# Patient Record
Sex: FEMALE | Race: WHITE | NOT HISPANIC OR LATINO | Employment: OTHER | URBAN - METROPOLITAN AREA
[De-identification: names, ages, dates, MRNs, and addresses within clinical notes are randomized per-mention and may not be internally consistent; named-entity substitution may affect disease eponyms.]

---

## 2017-06-13 ENCOUNTER — HOSPITAL ENCOUNTER (OUTPATIENT)
Facility: MEDICAL CENTER | Age: 82
End: 2017-06-13
Payer: COMMERCIAL

## 2017-06-13 PROCEDURE — 83036 HEMOGLOBIN GLYCOSYLATED A1C: CPT

## 2017-06-14 LAB
EST. AVERAGE GLUCOSE BLD GHB EST-MCNC: 157 MG/DL
HBA1C MFR BLD: 7.1 % (ref 0–5.6)

## 2017-12-12 ENCOUNTER — APPOINTMENT (OUTPATIENT)
Dept: SOCIAL WORK | Facility: CLINIC | Age: 82
End: 2017-12-12
Payer: MEDICARE

## 2017-12-12 PROCEDURE — 90736 HZV VACCINE LIVE SUBQ: CPT | Performed by: REGISTERED NURSE

## 2017-12-12 PROCEDURE — 90472 IMMUNIZATION ADMIN EACH ADD: CPT | Performed by: REGISTERED NURSE

## 2017-12-12 PROCEDURE — 90662 IIV NO PRSV INCREASED AG IM: CPT | Performed by: REGISTERED NURSE

## 2017-12-12 PROCEDURE — 90670 PCV13 VACCINE IM: CPT | Performed by: REGISTERED NURSE

## 2017-12-12 PROCEDURE — G0008 ADMIN INFLUENZA VIRUS VAC: HCPCS | Performed by: REGISTERED NURSE

## 2017-12-12 PROCEDURE — G0009 ADMIN PNEUMOCOCCAL VACCINE: HCPCS | Performed by: REGISTERED NURSE

## 2020-02-06 ENCOUNTER — OFFICE VISIT (OUTPATIENT)
Dept: URGENT CARE | Facility: CLINIC | Age: 85
End: 2020-02-06
Payer: MEDICARE

## 2020-02-06 VITALS
HEART RATE: 69 BPM | HEIGHT: 61 IN | WEIGHT: 140 LBS | BODY MASS INDEX: 26.43 KG/M2 | TEMPERATURE: 97.9 F | RESPIRATION RATE: 16 BRPM | OXYGEN SATURATION: 98 % | SYSTOLIC BLOOD PRESSURE: 140 MMHG | DIASTOLIC BLOOD PRESSURE: 50 MMHG
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
## 2020-02-06 PROCEDURE — 99204 OFFICE O/P NEW MOD 45 MIN: CPT | Mod: 25 | Performed by: FAMILY MEDICINE

## 2020-02-06 PROCEDURE — 90715 TDAP VACCINE 7 YRS/> IM: CPT | Performed by: FAMILY MEDICINE

## 2020-02-06 PROCEDURE — 90471 IMMUNIZATION ADMIN: CPT | Performed by: FAMILY MEDICINE

## 2020-02-06 RX ORDER — AMOXICILLIN AND CLAVULANATE POTASSIUM 875; 125 MG/1; MG/1
1 TABLET, FILM COATED ORAL 2 TIMES DAILY
Qty: 14 TAB | Refills: 0 | Status: SHIPPED | OUTPATIENT
Start: 2020-02-06 | End: 2020-02-13

## 2020-02-06 SDOH — HEALTH STABILITY: MENTAL HEALTH: HOW OFTEN DO YOU HAVE A DRINK CONTAINING ALCOHOL?: NEVER

## 2020-02-06 NOTE — PROGRESS NOTES
"Subjective:      Chief Complaint   Patient presents with   • Other     cat scratch                                 S/p  Cat scratch to left arm 7 d ago.          Pt continues to experience dull, achy, constant pain worse with use of the arm.   No fever.   No red streaking up the arm    She has not tried any medication for this    Past medical history was unremarkable and not pertinent to current issue  Social hx - denies tobacco, alcohol, drug use  Family hx was reviewed - no pertinent past family hx          Review of Systems   Constitutional: Negative for fever, chills and malaise/fatigue.   Eyes: Negative for vision changes, d/c.    Respiratory: Negative for cough and sputum production.    Cardiovascular: Negative for chest pain and palpitations.   Gastrointestinal: Negative for nausea, vomiting, abdominal pain, diarrhea and constipation.   Genitourinary: Negative for dysuria, urgency and frequency.   Skin: Negative for rash or  itching.   Neurological: Negative for dizziness and tingling.   Psychiatric/Behavioral: Negative for depression.   Hematologic/lymphatic - denies bruising or excessive bleeding  All other systems reviewed and are negative.       Objective:      /50 (BP Location: Right arm, Patient Position: Sitting)   Pulse 69   Temp 36.6 °C (97.9 °F)   Resp 16   Ht 1.549 m (5' 1\")   Wt 63.5 kg (140 lb)   SpO2 98%       Physical Exam   Constitutional: She is oriented to person, place, and time. Pt appears well-developed and well-nourished. No distress.   HENT:   Head: Normocephalic and atraumatic.   Eyes: Conjunctivae are normal.   Cardiovascular: Normal rate, regular rhythm and normal heart sounds.    Pulmonary/Chest: Effort normal and breath sounds normal. No respiratory distress. Pt has no wheezes.     Neurological: pt is alert and oriented to person, place, and time. No cranial nerve deficit.   Skin: Skin is warm.    There are several superficial abrasions or \"scratches\" over the left " forearm.   No red streaking up the arm.   There is some mild surrounding erythema and increased warmth around the scratch. Area is TTP  Nursing note and vitals reviewed.              Assessment/Plan:     1. Cat bite, initial encounter     - amoxicillin-clavulanate (AUGMENTIN) 875-125 MG Tab; Take 1 Tab by mouth 2 times a day for 7 days.  Dispense: 14 Tab; Refill: 0  - Tdap =>8yo IM    Follow up in one week if no improvement, sooner if symptoms worsen.

## 2020-04-27 ENCOUNTER — OFFICE VISIT (OUTPATIENT)
Dept: URGENT CARE | Facility: CLINIC | Age: 85
End: 2020-04-27
Payer: MEDICARE

## 2020-04-27 VITALS
HEIGHT: 61 IN | HEART RATE: 83 BPM | RESPIRATION RATE: 15 BRPM | DIASTOLIC BLOOD PRESSURE: 66 MMHG | OXYGEN SATURATION: 96 % | BODY MASS INDEX: 26.01 KG/M2 | SYSTOLIC BLOOD PRESSURE: 132 MMHG | TEMPERATURE: 97.3 F | WEIGHT: 137.8 LBS

## 2020-04-27 DIAGNOSIS — J06.9 VIRAL URI WITH COUGH: ICD-10-CM

## 2020-04-27 DIAGNOSIS — J98.01 BRONCHOSPASM: ICD-10-CM

## 2020-04-27 PROCEDURE — 99214 OFFICE O/P EST MOD 30 MIN: CPT | Performed by: FAMILY MEDICINE

## 2020-04-27 RX ORDER — INSULIN GLARGINE 100 [IU]/ML
10 INJECTION, SOLUTION SUBCUTANEOUS EVERY EVENING
COMMUNITY
Start: 2020-06-29 | End: 2022-01-01

## 2020-04-27 RX ORDER — ATORVASTATIN CALCIUM 20 MG/1
20 TABLET, FILM COATED ORAL NIGHTLY
COMMUNITY
End: 2022-01-01 | Stop reason: ALTCHOICE

## 2020-04-27 RX ORDER — ALBUTEROL SULFATE 90 UG/1
1-2 AEROSOL, METERED RESPIRATORY (INHALATION) EVERY 4 HOURS PRN
Qty: 1 INHALER | Refills: 0 | Status: SHIPPED | OUTPATIENT
Start: 2020-04-27 | End: 2021-01-01

## 2020-04-27 RX ORDER — METOPROLOL SUCCINATE 50 MG/1
50 TABLET, EXTENDED RELEASE ORAL DAILY
COMMUNITY
End: 2021-01-01 | Stop reason: DRUGHIGH

## 2020-04-27 RX ORDER — LETROZOLE 2.5 MG/1
2.5 TABLET, FILM COATED ORAL DAILY
COMMUNITY
End: 2021-03-31

## 2020-04-27 RX ORDER — GABAPENTIN 100 MG/1
100 CAPSULE ORAL 3 TIMES DAILY
COMMUNITY
End: 2021-03-31

## 2020-04-27 RX ORDER — BENZONATATE 100 MG/1
100 CAPSULE ORAL 3 TIMES DAILY PRN
Qty: 30 CAP | Refills: 0 | Status: SHIPPED | OUTPATIENT
Start: 2020-04-27 | End: 2021-01-01

## 2020-04-27 RX ORDER — LISINOPRIL 20 MG/1
20 TABLET ORAL 2 TIMES DAILY
COMMUNITY
End: 2022-01-01

## 2020-04-27 ASSESSMENT — ENCOUNTER SYMPTOMS
MYALGIAS: 0
SHORTNESS OF BREATH: 1
COUGH: 1
FEVER: 0
VOMITING: 0
NAUSEA: 0
SORE THROAT: 0
CHILLS: 0

## 2020-04-27 NOTE — PROGRESS NOTES
Subjective:   Sinai Huang is a 84 y.o. female who presents for Shortness of Breath (x 2 weeks; worse at night )        Cough   This is a new problem. Episode onset: 2 weeks. The problem has been unchanged. The cough is non-productive. Associated symptoms include shortness of breath (intermittently, worse at night, improves in morning). Pertinent negatives include no chest pain, chills, fever, myalgias, rash or sore throat. Risk factors: Community exposure to viral illness. She has tried nothing for the symptoms.     PMH:  has no past medical history on file.  MEDS:   Current Outpatient Medications:   •  atorvastatin (LIPITOR) 20 MG Tab, Take 20 mg by mouth every evening., Disp: , Rfl:   •  lisinopril (PRINIVIL) 20 MG Tab, Take 20 mg by mouth 2 times a day. Take 1 tablet by mouth twice a day, Disp: , Rfl:   •  metoprolol SR (TOPROL XL) 50 MG TABLET SR 24 HR, Take 50 mg by mouth every day. Take 1 tablet by mouth twice a day, Disp: , Rfl:   •  letrozole (FEMARA) 2.5 MG Tab, Take 2.5 mg by mouth every day., Disp: , Rfl:   •  insulin glargine (LANTUS) 100 UNIT/ML Solution, Inject  as instructed every evening., Disp: , Rfl:   •  gabapentin (NEURONTIN) 100 MG Cap, Take 100 mg by mouth 3 times a day. Take 1 to 2 caps by mouth at bedtime as needed, Disp: , Rfl:   •  metformin (GLUCOPHAGE) 1000 MG tablet, Take 1,000 mg by mouth 2 times a day, with meals., Disp: , Rfl:   •  albuterol 108 (90 Base) MCG/ACT Aero Soln inhalation aerosol, Inhale 1-2 Puffs by mouth every four hours as needed for Shortness of Breath., Disp: 1 Inhaler, Rfl: 0  •  benzonatate (TESSALON) 100 MG Cap, Take 1 Cap by mouth 3 times a day as needed for Cough., Disp: 30 Cap, Rfl: 0  •  Spacer/Aero-Holding Chambers Device, 1 Device by Does not apply route as needed., Disp: 1 Device, Rfl: 0  ALLERGIES: No Known Allergies  SURGHX: No past surgical history on file.  SOCHX:  reports that she has never smoked. She has never used smokeless tobacco. She reports  "that she does not drink alcohol or use drugs.  FH: Family history was reviewed  Review of Systems   Constitutional: Negative for chills and fever.   HENT: Negative for sore throat.    Respiratory: Positive for cough and shortness of breath (intermittently, worse at night, improves in morning).    Cardiovascular: Negative for chest pain.   Gastrointestinal: Negative for nausea and vomiting.   Musculoskeletal: Negative for myalgias.   Skin: Negative for rash.        Objective:   /66 (BP Location: Right arm, Patient Position: Sitting)   Pulse 83   Temp 36.3 °C (97.3 °F) (Temporal)   Resp 15   Ht 1.549 m (5' 1\")   Wt 62.5 kg (137 lb 12.8 oz)   SpO2 96%   BMI 26.04 kg/m²   Physical Exam  Vitals signs and nursing note reviewed.   Constitutional:       General: She is not in acute distress.     Appearance: She is well-developed.   HENT:      Head: Normocephalic and atraumatic.      Right Ear: Tympanic membrane and external ear normal.      Left Ear: Tympanic membrane and external ear normal.      Nose: Nose normal.      Mouth/Throat:      Mouth: Mucous membranes are moist.   Eyes:      Conjunctiva/sclera: Conjunctivae normal.   Cardiovascular:      Rate and Rhythm: Normal rate and regular rhythm.   Pulmonary:      Effort: Pulmonary effort is normal. No tachypnea or respiratory distress.      Breath sounds: Wheezing (mild) and rhonchi (few) present.      Comments: Speaking full sentences  Abdominal:      General: There is no distension.   Musculoskeletal: Normal range of motion.   Skin:     General: Skin is warm and dry.   Neurological:      General: No focal deficit present.      Mental Status: She is alert and oriented to person, place, and time. Mental status is at baseline.      Gait: Gait (gait at baseline) normal.   Psychiatric:         Judgment: Judgment normal.           Assessment/Plan:   1. Viral URI with cough  - benzonatate (TESSALON) 100 MG Cap; Take 1 Cap by mouth 3 times a day as needed for " Cough.  Dispense: 30 Cap; Refill: 0    2. Bronchospasm  - albuterol 108 (90 Base) MCG/ACT Aero Soln inhalation aerosol; Inhale 1-2 Puffs by mouth every four hours as needed for Shortness of Breath.  Dispense: 1 Inhaler; Refill: 0  - Spacer/Aero-Holding Chambers Device; 1 Device by Does not apply route as needed.  Dispense: 1 Device; Refill: 0    Other orders  - atorvastatin (LIPITOR) 20 MG Tab; Take 20 mg by mouth every evening.  - lisinopril (PRINIVIL) 20 MG Tab; Take 20 mg by mouth 2 times a day. Take 1 tablet by mouth twice a day  - metoprolol SR (TOPROL XL) 50 MG TABLET SR 24 HR; Take 50 mg by mouth every day. Take 1 tablet by mouth twice a day  - letrozole (FEMARA) 2.5 MG Tab; Take 2.5 mg by mouth every day.  - insulin glargine (LANTUS) 100 UNIT/ML Solution; Inject  as instructed every evening.  - gabapentin (NEURONTIN) 100 MG Cap; Take 100 mg by mouth 3 times a day. Take 1 to 2 caps by mouth at bedtime as needed  - metformin (GLUCOPHAGE) 1000 MG tablet; Take 1,000 mg by mouth 2 times a day, with meals.      Discussed close monitoring, return precautions, and supportive measures including maintaining adequate fluid hydration and caloric intake, relative rest and OTC symptom management including acetaminophen as needed for pain and/or fever.    Differential diagnosis, natural history, supportive care, and indications for immediate follow-up discussed.     Advised the patient to follow-up with the primary care physician for recheck, reevaluation, and consideration of further management.    Please note that this dictation was created using voice recognition software. I have worked with consultants from the vendor as well as technical experts from [employer] to optimize the interface. I have made every reasonable attempt to correct obvious errors, but I expect that there are errors of grammar and possibly content that I did not discover before finalizing the note.

## 2021-01-01 ENCOUNTER — HOME CARE VISIT (OUTPATIENT)
Dept: HOME HEALTH SERVICES | Facility: HOME HEALTHCARE | Age: 86
End: 2021-01-01
Payer: MEDICARE

## 2021-01-01 ENCOUNTER — DOCUMENTATION (OUTPATIENT)
Dept: MEDICAL GROUP | Facility: PHYSICIAN GROUP | Age: 86
End: 2021-01-01

## 2021-01-01 ENCOUNTER — TELEPHONE (OUTPATIENT)
Dept: SCHEDULING | Facility: IMAGING CENTER | Age: 86
End: 2021-01-01

## 2021-01-01 ENCOUNTER — HOME HEALTH ADMISSION (OUTPATIENT)
Dept: HOME HEALTH SERVICES | Facility: HOME HEALTHCARE | Age: 86
End: 2021-01-01
Payer: MEDICARE

## 2021-01-01 ENCOUNTER — APPOINTMENT (OUTPATIENT)
Dept: MEDICAL GROUP | Facility: PHYSICIAN GROUP | Age: 86
End: 2021-01-01
Payer: MEDICARE

## 2021-01-01 VITALS
WEIGHT: 111.06 LBS | SYSTOLIC BLOOD PRESSURE: 132 MMHG | OXYGEN SATURATION: 98 % | BODY MASS INDEX: 23.21 KG/M2 | HEART RATE: 61 BPM | RESPIRATION RATE: 16 BRPM | DIASTOLIC BLOOD PRESSURE: 58 MMHG | TEMPERATURE: 97.6 F

## 2021-01-01 VITALS
TEMPERATURE: 97.4 F | HEART RATE: 67 BPM | OXYGEN SATURATION: 97 % | RESPIRATION RATE: 16 BRPM | SYSTOLIC BLOOD PRESSURE: 140 MMHG | DIASTOLIC BLOOD PRESSURE: 62 MMHG

## 2021-01-01 VITALS
TEMPERATURE: 98.2 F | SYSTOLIC BLOOD PRESSURE: 122 MMHG | BODY MASS INDEX: 23.41 KG/M2 | BODY MASS INDEX: 23.41 KG/M2 | TEMPERATURE: 97.9 F | RESPIRATION RATE: 16 BRPM | OXYGEN SATURATION: 97 % | WEIGHT: 112 LBS | OXYGEN SATURATION: 92 % | BODY MASS INDEX: 23.25 KG/M2 | HEART RATE: 77 BPM | RESPIRATION RATE: 16 BRPM | RESPIRATION RATE: 16 BRPM | HEART RATE: 64 BPM | SYSTOLIC BLOOD PRESSURE: 118 MMHG | HEART RATE: 64 BPM | WEIGHT: 112 LBS | SYSTOLIC BLOOD PRESSURE: 130 MMHG | DIASTOLIC BLOOD PRESSURE: 58 MMHG | TEMPERATURE: 97.9 F | DIASTOLIC BLOOD PRESSURE: 60 MMHG | DIASTOLIC BLOOD PRESSURE: 60 MMHG | OXYGEN SATURATION: 98 % | WEIGHT: 111.25 LBS

## 2021-01-01 VITALS
RESPIRATION RATE: 16 BRPM | WEIGHT: 109 LBS | SYSTOLIC BLOOD PRESSURE: 130 MMHG | TEMPERATURE: 98.7 F | BODY MASS INDEX: 22.78 KG/M2 | OXYGEN SATURATION: 97 % | DIASTOLIC BLOOD PRESSURE: 62 MMHG | HEART RATE: 62 BPM

## 2021-01-01 VITALS
HEART RATE: 57 BPM | TEMPERATURE: 99.1 F | RESPIRATION RATE: 16 BRPM | SYSTOLIC BLOOD PRESSURE: 112 MMHG | DIASTOLIC BLOOD PRESSURE: 54 MMHG | OXYGEN SATURATION: 97 % | BODY MASS INDEX: 23.23 KG/M2 | WEIGHT: 111.13 LBS

## 2021-01-01 VITALS
TEMPERATURE: 99 F | DIASTOLIC BLOOD PRESSURE: 68 MMHG | WEIGHT: 112 LBS | SYSTOLIC BLOOD PRESSURE: 138 MMHG | OXYGEN SATURATION: 97 % | RESPIRATION RATE: 16 BRPM | BODY MASS INDEX: 23.41 KG/M2 | HEART RATE: 55 BPM

## 2021-01-01 VITALS
HEART RATE: 60 BPM | TEMPERATURE: 97.9 F | SYSTOLIC BLOOD PRESSURE: 122 MMHG | DIASTOLIC BLOOD PRESSURE: 64 MMHG | RESPIRATION RATE: 16 BRPM | WEIGHT: 112 LBS | BODY MASS INDEX: 23.41 KG/M2 | OXYGEN SATURATION: 98 %

## 2021-01-01 VITALS
OXYGEN SATURATION: 98 % | HEART RATE: 61 BPM | TEMPERATURE: 97.5 F | SYSTOLIC BLOOD PRESSURE: 130 MMHG | WEIGHT: 109 LBS | DIASTOLIC BLOOD PRESSURE: 60 MMHG | RESPIRATION RATE: 16 BRPM | BODY MASS INDEX: 22.78 KG/M2

## 2021-01-01 VITALS
BODY MASS INDEX: 23.2 KG/M2 | DIASTOLIC BLOOD PRESSURE: 64 MMHG | OXYGEN SATURATION: 99 % | HEART RATE: 62 BPM | SYSTOLIC BLOOD PRESSURE: 132 MMHG | TEMPERATURE: 97.6 F | RESPIRATION RATE: 16 BRPM | WEIGHT: 111 LBS

## 2021-01-01 VITALS
SYSTOLIC BLOOD PRESSURE: 130 MMHG | TEMPERATURE: 97.5 F | RESPIRATION RATE: 16 BRPM | OXYGEN SATURATION: 98 % | HEART RATE: 61 BPM | DIASTOLIC BLOOD PRESSURE: 60 MMHG

## 2021-01-01 VITALS
OXYGEN SATURATION: 97 % | BODY MASS INDEX: 23.23 KG/M2 | SYSTOLIC BLOOD PRESSURE: 112 MMHG | WEIGHT: 111.13 LBS | HEART RATE: 62 BPM | RESPIRATION RATE: 16 BRPM | DIASTOLIC BLOOD PRESSURE: 54 MMHG | TEMPERATURE: 99.1 F

## 2021-01-01 VITALS
WEIGHT: 111 LBS | OXYGEN SATURATION: 98 % | SYSTOLIC BLOOD PRESSURE: 132 MMHG | TEMPERATURE: 97.4 F | HEART RATE: 57 BPM | DIASTOLIC BLOOD PRESSURE: 58 MMHG | BODY MASS INDEX: 23.2 KG/M2 | RESPIRATION RATE: 16 BRPM

## 2021-01-01 VITALS
BODY MASS INDEX: 23.41 KG/M2 | HEART RATE: 62 BPM | DIASTOLIC BLOOD PRESSURE: 62 MMHG | TEMPERATURE: 97.9 F | OXYGEN SATURATION: 97 % | SYSTOLIC BLOOD PRESSURE: 120 MMHG | RESPIRATION RATE: 18 BRPM | WEIGHT: 112 LBS

## 2021-01-01 VITALS
RESPIRATION RATE: 16 BRPM | OXYGEN SATURATION: 95 % | HEART RATE: 64 BPM | BODY MASS INDEX: 23.46 KG/M2 | WEIGHT: 112.25 LBS | TEMPERATURE: 97.6 F | DIASTOLIC BLOOD PRESSURE: 64 MMHG | SYSTOLIC BLOOD PRESSURE: 130 MMHG

## 2021-01-01 VITALS
SYSTOLIC BLOOD PRESSURE: 120 MMHG | RESPIRATION RATE: 16 BRPM | OXYGEN SATURATION: 98 % | DIASTOLIC BLOOD PRESSURE: 60 MMHG | TEMPERATURE: 97.9 F | HEART RATE: 61 BPM

## 2021-01-01 VITALS
DIASTOLIC BLOOD PRESSURE: 62 MMHG | OXYGEN SATURATION: 97 % | TEMPERATURE: 97.9 F | BODY MASS INDEX: 23.41 KG/M2 | WEIGHT: 112 LBS | SYSTOLIC BLOOD PRESSURE: 120 MMHG | RESPIRATION RATE: 62 BRPM | HEART RATE: 62 BPM

## 2021-01-01 VITALS
HEART RATE: 67 BPM | RESPIRATION RATE: 16 BRPM | DIASTOLIC BLOOD PRESSURE: 62 MMHG | TEMPERATURE: 97.4 F | OXYGEN SATURATION: 97 % | SYSTOLIC BLOOD PRESSURE: 140 MMHG

## 2021-01-01 VITALS
HEART RATE: 73 BPM | TEMPERATURE: 97.4 F | SYSTOLIC BLOOD PRESSURE: 103 MMHG | RESPIRATION RATE: 16 BRPM | DIASTOLIC BLOOD PRESSURE: 53 MMHG | OXYGEN SATURATION: 98 %

## 2021-01-01 VITALS
BODY MASS INDEX: 24.14 KG/M2 | WEIGHT: 115 LBS | RESPIRATION RATE: 30 BRPM | HEIGHT: 58 IN | DIASTOLIC BLOOD PRESSURE: 62 MMHG | HEART RATE: 74 BPM | SYSTOLIC BLOOD PRESSURE: 138 MMHG | TEMPERATURE: 98.8 F | OXYGEN SATURATION: 98 %

## 2021-01-01 VITALS
SYSTOLIC BLOOD PRESSURE: 118 MMHG | OXYGEN SATURATION: 97 % | DIASTOLIC BLOOD PRESSURE: 60 MMHG | HEART RATE: 64 BPM | RESPIRATION RATE: 16 BRPM | TEMPERATURE: 97.9 F

## 2021-01-01 VITALS
HEART RATE: 88 BPM | BODY MASS INDEX: 23.25 KG/M2 | OXYGEN SATURATION: 98 % | WEIGHT: 111.25 LBS | RESPIRATION RATE: 16 BRPM | SYSTOLIC BLOOD PRESSURE: 140 MMHG | DIASTOLIC BLOOD PRESSURE: 80 MMHG | TEMPERATURE: 97.6 F

## 2021-01-01 VITALS
SYSTOLIC BLOOD PRESSURE: 122 MMHG | WEIGHT: 112 LBS | DIASTOLIC BLOOD PRESSURE: 60 MMHG | RESPIRATION RATE: 16 BRPM | HEART RATE: 64 BPM | TEMPERATURE: 97.9 F | OXYGEN SATURATION: 92 % | BODY MASS INDEX: 23.41 KG/M2

## 2021-01-01 VITALS
WEIGHT: 111 LBS | SYSTOLIC BLOOD PRESSURE: 138 MMHG | HEART RATE: 68 BPM | BODY MASS INDEX: 23.2 KG/M2 | DIASTOLIC BLOOD PRESSURE: 64 MMHG | OXYGEN SATURATION: 98 % | RESPIRATION RATE: 16 BRPM | TEMPERATURE: 98 F

## 2021-01-01 VITALS
TEMPERATURE: 99.1 F | OXYGEN SATURATION: 97 % | HEART RATE: 62 BPM | RESPIRATION RATE: 16 BRPM | SYSTOLIC BLOOD PRESSURE: 112 MMHG | DIASTOLIC BLOOD PRESSURE: 84 MMHG

## 2021-01-01 VITALS
RESPIRATION RATE: 16 BRPM | OXYGEN SATURATION: 98 % | TEMPERATURE: 97.9 F | WEIGHT: 112 LBS | DIASTOLIC BLOOD PRESSURE: 64 MMHG | BODY MASS INDEX: 23.41 KG/M2 | SYSTOLIC BLOOD PRESSURE: 122 MMHG | HEART RATE: 57 BPM

## 2021-01-01 PROCEDURE — G0299 HHS/HOSPICE OF RN EA 15 MIN: HCPCS

## 2021-01-01 PROCEDURE — G0152 HHCP-SERV OF OT,EA 15 MIN: HCPCS

## 2021-01-01 PROCEDURE — G0151 HHCP-SERV OF PT,EA 15 MIN: HCPCS

## 2021-01-01 PROCEDURE — G0495 RN CARE TRAIN/EDU IN HH: HCPCS

## 2021-01-01 PROCEDURE — 665001 SOC-HOME HEALTH

## 2021-01-01 SDOH — ECONOMIC STABILITY: HOUSING INSECURITY: EVIDENCE OF SMOKING MATERIAL: 0

## 2021-01-01 SDOH — ECONOMIC STABILITY: FOOD INSECURITY: MEALS PER DAY: 3

## 2021-01-01 SDOH — ECONOMIC STABILITY: HOUSING INSECURITY
HOME SAFETY: ON EACH LEVEL OF THE HOME. PATIENT DOES HAVE A FIRE ESCAPE PLAN DEVELOPED. PATIENT DOES NOT HAVE FLAMMABLE MATERIALS PRESENT IN THE HOME PRESENTING A FIRE HAZARD. NO EVIDENCE FOUND OF SMOKING MATERIALS PRESENT IN THE HOME.  OXYGEN DOSE RANGE: 1 L/MIN

## 2021-01-01 SDOH — ECONOMIC STABILITY: HOUSING INSECURITY: HOME SAFETY: RESPIRATORY ROUTE VIA: NASAL CANNULA   OXYGEN SUPPLIER: OWNS CONCENTRATOR

## 2021-01-01 SDOH — ECONOMIC STABILITY: HOUSING INSECURITY
HOME SAFETY: OXYGEN SAFETY RISK ASSESSMENT PERFORMED. PATIENT PT WAS GIVEN A NO SMOKING SIGN AND PROVIDED EDUCATION ABOUT WHY IT IS IMPORTANT TO PLACE ONE. PATIENT DOES HAVE A WORKING FIRE EXTINGUISHER PRESENT IN THE HOME. SMOKE ALARMS ARE PRESENT AND FUNCTIONAL

## 2021-01-01 ASSESSMENT — ENCOUNTER SYMPTOMS
PAIN LOCATION - PAIN QUALITY: DULL,ACHY
LOWEST PAIN SEVERITY IN PAST 24 HOURS: 3/10
PAIN LOCATION - PAIN QUALITY: SPASM
HIGHEST PAIN SEVERITY IN PAST 24 HOURS: 0/10
PAIN LOCATION - PAIN QUALITY: SHARP
PERSON REPORTING PAIN: PATIENT
LOWEST PAIN SEVERITY IN PAST 24 HOURS: 2/10
PAIN LOCATION - EXACERBATING FACTORS: STANDING, WALKING
PERSON REPORTING PAIN: PATIENT
LAST BOWEL MOVEMENT: 66072
PAIN LOCATION - PAIN SEVERITY: 8/10
PAIN SEVERITY GOAL: 0/10
HIGHEST PAIN SEVERITY IN PAST 24 HOURS: 8/10
PERSON REPORTING PAIN: PATIENT
PAIN LOCATION - PAIN QUALITY: DULL, THROBBING
PAIN LOCATION - PAIN DURATION: FEW MINUTES
PAIN: 1
STOOL FREQUENCY: LESS THAN DAILY
PAIN LOCATION: BACK
PAIN LOCATION - PAIN FREQUENCY: FREQUENT
PAIN LOCATION - PAIN QUALITY: ACHING
PAIN LOCATION - RELIEVING FACTORS: REST
SUBJECTIVE PAIN PROGRESSION: UNCHANGED
LOWEST PAIN SEVERITY IN PAST 24 HOURS: 3/10
PAIN LOCATION - EXACERBATING FACTORS: STANDING, WALKING
PERSON REPORTING PAIN: PATIENT
NAUSEA: DENIES
DEBILITATING PAIN: 1
PAIN LOCATION - PAIN FREQUENCY: FREQUENT
PERSON REPORTING PAIN: PATIENT
SUBJECTIVE PAIN PROGRESSION: GRADUALLY IMPROVING
VOMITING: DENIES
PAIN SEVERITY GOAL: 1/10
PAIN LOCATION - PAIN FREQUENCY: INTERMITTENT
PAIN: 1
NAUSEA: DENIES
PAIN SEVERITY GOAL: 0/10
PAIN SEVERITY GOAL: 0/10
PAIN LOCATION: BACK
HIGHEST PAIN SEVERITY IN PAST 24 HOURS: 6/10
PAIN LOCATION - PAIN QUALITY: ACHY
PAIN LOCATION - PAIN DURATION: DAILY
PERSON REPORTING PAIN: PATIENT
PAIN LOCATION - PAIN FREQUENCY: INTERMITTENT
LOWEST PAIN SEVERITY IN PAST 24 HOURS: 7/10
SUBJECTIVE PAIN PROGRESSION: WAXING AND WANING
HIGHEST PAIN SEVERITY IN PAST 24 HOURS: 4/10
LOWEST PAIN SEVERITY IN PAST 24 HOURS: 5/10
PAIN: 1
PAIN LOCATION - RELIEVING FACTORS: PAIN MEDICATION
PERSON REPORTING PAIN: PATIENT
HIGHEST PAIN SEVERITY IN PAST 24 HOURS: 5/10
PAIN LOCATION - PAIN DURATION: FEW MINUTES
PAIN: 1
SUBJECTIVE PAIN PROGRESSION: GRADUALLY WORSENING
PAIN LOCATION: BACK
PAIN SEVERITY GOAL: 0/10
MUSCLE WEAKNESS: 1
NAUSEA: DENIES
VOMITING: DENIES
PAIN: 1
PAIN: 1
PAIN LOCATION - PAIN FREQUENCY: FREQUENT
HIGHEST PAIN SEVERITY IN PAST 24 HOURS: 4/10
PAIN LOCATION - PAIN SEVERITY: 5/10
MUSCLE WEAKNESS: 1
VOMITING: DENIES
PAIN LOCATION - PAIN DURATION: FEW WEEKS
PAIN LOCATION - EXACERBATING FACTORS: STANDING, WALKING
HIGHEST PAIN SEVERITY IN PAST 24 HOURS: 10/10
NAUSEA: DENIES
HIGHEST PAIN SEVERITY IN PAST 24 HOURS: 1/10
PAIN SEVERITY GOAL: 0/10
DENIES PAIN: 1
PAIN LOCATION - EXACERBATING FACTORS: MOVEMENT
PAIN LOCATION - EXACERBATING FACTORS: EXERTION
PAIN LOCATION - EXACERBATING FACTORS: MOVEMENT
PAIN LOCATION - PAIN QUALITY: ACHING
LOWEST PAIN SEVERITY IN PAST 24 HOURS: 1/10
PAIN LOCATION - PAIN FREQUENCY: CONSTANT
PAIN: 1
PAIN LOCATION: BACK
PAIN LOCATION - PAIN DURATION: FEW WEEKS
BOWEL PATTERN NORMAL: 1
PAIN LOCATION - PAIN FREQUENCY: FREQUENT
PAIN: 1
PAIN LOCATION - PAIN SEVERITY: 5/10
ASSOCIATED SYMPTOMS: DENIES
LOWEST PAIN SEVERITY IN PAST 24 HOURS: 2/10
PAIN LOCATION - EXACERBATING FACTORS: STANDING, WALKING
PAIN LOCATION - PAIN QUALITY: DULL
PERSON REPORTING PAIN: PATIENT
PAIN LOCATION - PAIN SEVERITY: 8/10
PAIN SEVERITY GOAL: 1/10
POOR JUDGMENT: 1
LOWEST PAIN SEVERITY IN PAST 24 HOURS: 4/10
PERSON REPORTING PAIN: PATIENT
ASSOCIATED SYMPTOMS: DENIES
PAIN: 1
PAIN LOCATION - EXACERBATING FACTORS: EXERTION
PAIN LOCATION - PAIN SEVERITY: 6/10
PAIN LOCATION - PAIN FREQUENCY: CONSTANT
LOWEST PAIN SEVERITY IN PAST 24 HOURS: 2/10
PAIN LOCATION: BACK
VOMITING: DENIES
PAIN SEVERITY GOAL: 0/10
HIGHEST PAIN SEVERITY IN PAST 24 HOURS: 4/10
PAIN LOCATION - PAIN DURATION: DAILY
LOWEST PAIN SEVERITY IN PAST 24 HOURS: 4/10
HIGHEST PAIN SEVERITY IN PAST 24 HOURS: 4/10
PERSON REPORTING PAIN: PATIENT
SUBJECTIVE PAIN PROGRESSION: UNCHANGED
PERSON REPORTING PAIN: PATIENT
DENIES PAIN: 1
PAIN LOCATION - PAIN DURATION: 20 MINUTES
PERSON REPORTING PAIN: PATIENT
HIGHEST PAIN SEVERITY IN PAST 24 HOURS: 6/10
LOWEST PAIN SEVERITY IN PAST 24 HOURS: 5/10
PAIN LOCATION: BACK
SUBJECTIVE PAIN PROGRESSION: UNCHANGED
LOWEST PAIN SEVERITY IN PAST 24 HOURS: 0/10
PAIN LOCATION - PAIN DURATION: 2 DAYS
SUBJECTIVE PAIN PROGRESSION: WAXING AND WANING
PAIN LOCATION - PAIN SEVERITY: 4/10
PAIN SEVERITY GOAL: 1/10
PAIN SEVERITY GOAL: 0/10
PAIN SEVERITY GOAL: 0/10
PAIN LOCATION - PAIN SEVERITY: 8/10
PAIN LOCATION - EXACERBATING FACTORS: STANDING, WALKING, MOVING
PAIN LOCATION - PAIN DURATION: DAILY
PAIN LOCATION - PAIN SEVERITY: 6/10
PAIN LOCATION - EXACERBATING FACTORS: MOVEMENT
PAIN LOCATION - PAIN QUALITY: ACHING
PAIN LOCATION: BACK
PERSON REPORTING PAIN: PATIENT
PAIN LOCATION - PAIN SEVERITY: 8/10
HIGHEST PAIN SEVERITY IN PAST 24 HOURS: 7/10
PAIN LOCATION - PAIN DURATION: DAILY
PAIN LOCATION - RELIEVING FACTORS: REST, TYLENOL
NAUSEA: DENIES
PAIN: 1
PAIN LOCATION: BACK
HIGHEST PAIN SEVERITY IN PAST 24 HOURS: 8/10
PAIN SEVERITY GOAL: 1/10
NAUSEA: DENIES
VOMITING: DENIES
PAIN LOCATION - PAIN SEVERITY: 5/10
PAIN SEVERITY GOAL: 0/10
SUBJECTIVE PAIN PROGRESSION: UNCHANGED
PAIN LOCATION: BACK
PAIN LOCATION - PAIN SEVERITY: 5/10
PAIN LOCATION - PAIN SEVERITY: 3/10
PAIN LOCATION: BACK
PAIN LOCATION - RELIEVING FACTORS: PAIN MEDS, REST, REPOSITIONING
PAIN LOCATION: BACK
PAIN: 1
ASSOCIATED SYMPTOMS: DENIES
VOMITING: DENIES
PAIN LOCATION - RELIEVING FACTORS: REST, TYLENOL
HYPERTENSION: 1
PAIN LOCATION - PAIN SEVERITY: 3/10
PERSON REPORTING PAIN: PATIENT
PAIN LOCATION - PAIN QUALITY: ACHING, BURNING
SUBJECTIVE PAIN PROGRESSION: UNCHANGED
ASSOCIATED SYMPTOMS: DENIES
CHANGE IN APPETITE: INCREASED
PAIN SEVERITY GOAL: 0/10
PAIN LOCATION - RELIEVING FACTORS: REST
NAUSEA: DENIES
SUBJECTIVE PAIN PROGRESSION: WAXING AND WANING
PAIN LOCATION - PAIN FREQUENCY: INTERMITTENT
PERSON REPORTING PAIN: PATIENT
LOWEST PAIN SEVERITY IN PAST 24 HOURS: 6/10
SUBJECTIVE PAIN PROGRESSION: WAXING AND WANING
SUBJECTIVE PAIN PROGRESSION: WAXING AND WANING
PAIN LOCATION - RELIEVING FACTORS: REST, REPOSITIONING
POOR JUDGMENT: 1
PAIN LOCATION - PAIN FREQUENCY: INTERMITTENT
PAIN LOCATION: CHEST
PAIN: 1
PAIN LOCATION - PAIN QUALITY: DULL, ACHY, SHARP
PAIN SEVERITY GOAL: 0/10
PAIN LOCATION: GENERALIZED
PAIN LOCATION - PAIN FREQUENCY: CONSTANT
PERSON REPORTING PAIN: PATIENT
ASSOCIATED SYMPTOMS: DENIES
SUBJECTIVE PAIN PROGRESSION: UNCHANGED
PAIN LOCATION - RELIEVING FACTORS: TYLENOL
LOWEST PAIN SEVERITY IN PAST 24 HOURS: 5/10
PAIN LOCATION - RELIEVING FACTORS: REST
HIGHEST PAIN SEVERITY IN PAST 24 HOURS: 2/10
PAIN LOCATION - PAIN DURATION: DAILY
LOWEST PAIN SEVERITY IN PAST 24 HOURS: 0/10
PAIN: 1
PAIN LOCATION - PAIN FREQUENCY: INFREQUENT
PERSON REPORTING PAIN: PATIENT
HIGHEST PAIN SEVERITY IN PAST 24 HOURS: 8/10
PAIN LOCATION - PAIN DURATION: COUPLE WEEKS
PAIN LOCATION - PAIN SEVERITY: 8/10
PAIN LOCATION - RELIEVING FACTORS: PAIN MEDS, REST, REPOSITIONING
HIGHEST PAIN SEVERITY IN PAST 24 HOURS: 4/10
VOMITING: DENIES
ASSOCIATED SYMPTOMS: DENIES
HIGHEST PAIN SEVERITY IN PAST 24 HOURS: 10/10
PAIN: 1
PAIN SEVERITY GOAL: 1/10
PAIN: 1
MUSCLE WEAKNESS: 1
PAIN LOCATION - PAIN SEVERITY: 3/10
PERSON REPORTING PAIN: PATIENT
DEBILITATING PAIN: 1
NAUSEA: DENIES
DIFFICULTY THINKING: 1
LOWEST PAIN SEVERITY IN PAST 24 HOURS: 1/10
SUBJECTIVE PAIN PROGRESSION: WAXING AND WANING
SUBJECTIVE PAIN PROGRESSION: UNCHANGED
PAIN LOCATION - PAIN SEVERITY: 5/10
ASSOCIATED SYMPTOMS: DENIES
PAIN: 1
NAUSEA: DENIES
VOMITING: DENIES
SUBJECTIVE PAIN PROGRESSION: UNCHANGED
PAIN LOCATION: BACK
LOWEST PAIN SEVERITY IN PAST 24 HOURS: 2/10
PAIN LOCATION - PAIN DURATION: FEW WEEKS
SUBJECTIVE PAIN PROGRESSION: GRADUALLY WORSENING
MUSCLE WEAKNESS: 1
SUBJECTIVE PAIN PROGRESSION: UNCHANGED
PAIN: 1
PAIN LOCATION - PAIN DURATION: FEW WEEKS
PAIN: 1
PAIN LOCATION - PAIN DURATION: DAILY
MUSCLE WEAKNESS: 1
PERSON REPORTING PAIN: PATIENT
LOWEST PAIN SEVERITY IN PAST 24 HOURS: 4/10
PAIN LOCATION - RELIEVING FACTORS: REST, REPOSITIONING
NAUSEA: DENIES
MUSCLE WEAKNESS: 1
PAIN LOCATION - RELIEVING FACTORS: REST
PAIN SEVERITY GOAL: 0/10
PAIN LOCATION - PAIN QUALITY: SPASMS
PAIN LOCATION - RELIEVING FACTORS: REST, MEDICATIONS
PAIN LOCATION - PAIN FREQUENCY: CONSTANT
MUSCLE WEAKNESS: 1
DENIES PAIN: 1
PERSON REPORTING PAIN: PATIENT
HIGHEST PAIN SEVERITY IN PAST 24 HOURS: 10/10
PAIN LOCATION - RELIEVING FACTORS: REST, REPOSITIONING, PAIN MEDS
PAIN LOCATION - PAIN QUALITY: ACHE
PAIN LOCATION - RELIEVING FACTORS: ACETAMINOPHEN
PAIN SEVERITY GOAL: 1/10
PAIN LOCATION - PAIN FREQUENCY: INTERMITTENT
NAUSEA: DENIES
HIGHEST PAIN SEVERITY IN PAST 24 HOURS: 8/10
SUBJECTIVE PAIN PROGRESSION: WAXING AND WANING
PAIN LOCATION - PAIN SEVERITY: 3/10
PAIN LOCATION: BACK
SUBJECTIVE PAIN PROGRESSION: WAXING AND WANING
LOWEST PAIN SEVERITY IN PAST 24 HOURS: 0/10
PAIN LOCATION - PAIN DURATION: 30 MINUTES
PAIN LOCATION - EXACERBATING FACTORS: EXERTION
PAIN LOCATION - PAIN QUALITY: ACHING
HIGHEST PAIN SEVERITY IN PAST 24 HOURS: 10/10
LOWEST PAIN SEVERITY IN PAST 24 HOURS: 0/10
LOWEST PAIN SEVERITY IN PAST 24 HOURS: 2/10
PAIN SEVERITY GOAL: 0/10
ASSOCIATED SYMPTOMS: DENIES
SUBJECTIVE PAIN PROGRESSION: WAXING AND WANING
PERSON REPORTING PAIN: PATIENT
SUBJECTIVE PAIN PROGRESSION: UNCHANGED
PAIN LOCATION - EXACERBATING FACTORS: MOVEMENT
LOWEST PAIN SEVERITY IN PAST 24 HOURS: 2/10
PERSON REPORTING PAIN: PATIENT
PAIN LOCATION - PAIN QUALITY: ACHING
APPETITE LEVEL: GOOD
HIGHEST PAIN SEVERITY IN PAST 24 HOURS: 3/10
PAIN: 1
PAIN LOCATION - PAIN SEVERITY: 6/10
PAIN LOCATION - PAIN QUALITY: ACHING, BURNING
PAIN LOCATION - PAIN FREQUENCY: FREQUENT
LOWEST PAIN SEVERITY IN PAST 24 HOURS: 3/10
HIGHEST PAIN SEVERITY IN PAST 24 HOURS: 7/10
LOWEST PAIN SEVERITY IN PAST 24 HOURS: 2/10
PAIN LOCATION: BACK
MUSCLE WEAKNESS: 1
DEBILITATING PAIN: 1
PAIN LOCATION - PAIN FREQUENCY: CONSTANT
VOMITING: DENIES
PAIN LOCATION - RELIEVING FACTORS: MEDICATION
VOMITING: DENIES
PAIN LOCATION - PAIN DURATION: FEW MINUTES
PAIN LOCATION: RIGHT LEG
PAIN LOCATION - PAIN FREQUENCY: INTERMITTENT
PAIN LOCATION - EXACERBATING FACTORS: MOVEMENT
PAIN SEVERITY GOAL: 0/10
PAIN SEVERITY GOAL: 0/10
PERSON REPORTING PAIN: PATIENT
PAIN LOCATION: BACK
PAIN: 1
ASSOCIATED SYMPTOMS: DENIES
PAIN LOCATION - PAIN DURATION: DAILY
PAIN LOCATION - PAIN DURATION: DAILY
SHORTNESS OF BREATH: T
VOMITING: DENIES
PAIN LOCATION - PAIN SEVERITY: 5/10
PAIN LOCATION - PAIN QUALITY: ACHING, BURNING
PAIN LOCATION - PAIN FREQUENCY: FREQUENT
PAIN LOCATION - PAIN SEVERITY: 6/10
PAIN LOCATION - EXACERBATING FACTORS: EXERTION
PAIN LOCATION: BACK
PAIN LOCATION - PAIN FREQUENCY: FREQUENT
HIGHEST PAIN SEVERITY IN PAST 24 HOURS: 8/10
LOWEST PAIN SEVERITY IN PAST 24 HOURS: 8/10
PAIN LOCATION - PAIN DURATION: 20 MINUTES
PAIN: 1
PAIN: 1
HIGHEST PAIN SEVERITY IN PAST 24 HOURS: 8/10
PAIN LOCATION - RELIEVING FACTORS: REST, PAIN MEDS
PERSON REPORTING PAIN: PATIENT
PAIN LOCATION - PAIN FREQUENCY: FREQUENT
PAIN LOCATION - EXACERBATING FACTORS: EXERTION
PAIN SEVERITY GOAL: 0/10
PERSON REPORTING PAIN: PATIENT
PERSON REPORTING PAIN: PATIENT
PAIN SEVERITY GOAL: 0/10
PAIN LOCATION - PAIN FREQUENCY: FREQUENT
PAIN LOCATION - PAIN DURATION: A WEEK
PAIN LOCATION - PAIN QUALITY: ACHING, BURNING
PAIN SEVERITY GOAL: 0/10
PAIN LOCATION: BACK
SUBJECTIVE PAIN PROGRESSION: UNCHANGED
PAIN LOCATION - PAIN SEVERITY: 9/10
HIGHEST PAIN SEVERITY IN PAST 24 HOURS: 9/10
PAIN LOCATION - PAIN QUALITY: ACHING, BURNING
PAIN LOCATION - PAIN QUALITY: ACHING
PAIN LOCATION - EXACERBATING FACTORS: STANDING, WALKING
PAIN SEVERITY GOAL: 0/10

## 2021-01-01 ASSESSMENT — FIBROSIS 4 INDEX
FIB4 SCORE: 1.275362419896548378

## 2021-01-01 ASSESSMENT — ACTIVITIES OF DAILY LIVING (ADL)
DRESSING_UB_CURRENT_FUNCTION: STAND BY ASSIST
AMBULATION ASSISTANCE: STAND BY ASSIST
PHYSICAL TRANSFERS ASSESSED: 1
FEEDING_COMMENTS: SEE OT NOTES
AMBULATION ASSISTANCE: SUPERVISION
ORAL_CARE_ASSESSED: 1
ORAL_CARE_CURRENT_FUNCTION: INDEPENDENT
AMBULATION ASSISTANCE ON FLAT SURFACES: 1
AMBULATION ASSISTANCE: 1
LAUNDRY: DEPENDENT
PHYSICAL TRANSFERS ASSESSED: 1
LAUNDRY ASSESSED: 1
OASIS_M1830: 05
BATHING_COMMENTS: SEE OT NOTES
TOILETING: 1
AMBULATION ASSISTANCE: 1
TRANSPORTATION COMMENTS: PT REQUIRES ASSIST AND ASSISTIVE DEVICE TO LEAVE HOME; FALL RISK
FEEDING ASSESSED: 1
LIGHT HOUSEKEEPING: NEEDS ASSISTANCE
PHYSICAL_TRANSFERS_DEVICES: USE OF B UES
TOILETING: STAND BY ASSIST
AMBULATION ASSISTANCE: STAND BY ASSIST
AMBULATION ASSISTANCE: 1
GROOMING_WITHIN_DEFINED_LIMITS: 1
CURRENT_FUNCTION: STAND BY ASSIST
FEEDING_WITHIN_DEFINED_LIMITS: 1
AMBULATION ASSISTANCE ON FLAT SURFACES: 1
CURRENT_FUNCTION: STAND BY ASSIST
GROOMING_CURRENT_FUNCTION: INDEPENDENT
CURRENT_FUNCTION: STAND BY ASSIST
CURRENT_FUNCTION: SUPERVISION
DRESSING_LB_CURRENT_FUNCTION: MINIMUM ASSIST
FEEDING: INDEPENDENT
PHYSICAL TRANSFERS ASSESSED: 1
HOUSEKEEPING ASSESSED: 1
GROOMING ASSESSED: 1
GROOMING_COMMENTS: SEE OT NOTES

## 2021-01-01 ASSESSMENT — BALANCE ASSESSMENTS
NUDGED: 1 - STAGGERS, GRABS, CATCHES SELF
STANDING BALANCE: 1 - STEADY BUT WIDE STANCE AND USES CANE OR OTHER SUPPORT
EYES CLOSED AT MAXIMUM POSITION NUDGED: 0 - UNSTEADY
ARISING SCORE: 1
ARISES: 1 - ABLE, USES ARMS TO HELP
ATTEMPTS TO ARISE: 2 - ABLE TO RISE, ONE ATTEMPT
IMMEDIATE STANDING BALANCE FIRST 5 SECONDS: 1 - STEADY BUT USES WALKER OR OTHER SUPPORT
SITTING BALANCE: 1 - STEADY, SAFE
TURNING 360 DEGREES STEPS: 1 - CONTINUOUS STEPS
SITTING DOWN: 1 - USES ARMS OR NOT SMOOTH MOTION
NUDGED SCORE: 1
BALANCE SCORE: 10

## 2021-01-01 ASSESSMENT — GAIT ASSESSMENTS
STEP SYMMETRY: 1 - RIGHT AND LEFT STEP LENGTH APPEAR EQUAL
BALANCE AND GAIT SCORE: 19
PATH: 1 - MILD/MODERATE DEVIATION OR USES WALKING AID
STEP CONTINUITY: 1 - STEPS APPEAR CONTINUOUS
GAIT SCORE: 9
PATH SCORE: 1
INITIATION OF GAIT IMMEDIATELY AFTER GO: 1 - NO HESITANCY
TRUNK SCORE: 1
TRUNK: 1 - NO SWAY BUT FLEXION OF KNEES OR BACK OR SPREADS ARMS WHILE WALKING
WALKING STANCE: 0 - HEELS APART

## 2021-01-01 ASSESSMENT — PATIENT HEALTH QUESTIONNAIRE - PHQ9
1. LITTLE INTEREST OR PLEASURE IN DOING THINGS: 00
2. FEELING DOWN, DEPRESSED, IRRITABLE, OR HOPELESS: 00
CLINICAL INTERPRETATION OF PHQ2 SCORE: 0

## 2021-01-11 DIAGNOSIS — Z23 NEED FOR VACCINATION: ICD-10-CM

## 2021-03-29 ENCOUNTER — HOME HEALTH ADMISSION (OUTPATIENT)
Dept: HOME HEALTH SERVICES | Facility: HOME HEALTHCARE | Age: 86
End: 2021-03-29
Payer: MEDICARE

## 2021-03-31 ENCOUNTER — HOME CARE VISIT (OUTPATIENT)
Dept: HOME HEALTH SERVICES | Facility: HOME HEALTHCARE | Age: 86
End: 2021-03-31
Payer: MEDICARE

## 2021-03-31 VITALS
BODY MASS INDEX: 22.84 KG/M2 | DIASTOLIC BLOOD PRESSURE: 70 MMHG | OXYGEN SATURATION: 99 % | HEART RATE: 68 BPM | RESPIRATION RATE: 18 BRPM | TEMPERATURE: 97.6 F | SYSTOLIC BLOOD PRESSURE: 130 MMHG | WEIGHT: 124.13 LBS | HEIGHT: 62 IN

## 2021-03-31 PROCEDURE — 665001 SOC-HOME HEALTH

## 2021-03-31 PROCEDURE — G0493 RN CARE EA 15 MIN HH/HOSPICE: HCPCS

## 2021-03-31 SDOH — ECONOMIC STABILITY: HOUSING INSECURITY: HOME SAFETY: BATHROOM LACKS GRAB BARS, HUSBAND STATES HE WILL BE HIRING SOMEONE TO INSTALL GRAB BARS

## 2021-03-31 ASSESSMENT — PATIENT HEALTH QUESTIONNAIRE - PHQ9
2. FEELING DOWN, DEPRESSED, IRRITABLE, OR HOPELESS: 00
1. LITTLE INTEREST OR PLEASURE IN DOING THINGS: 00
CLINICAL INTERPRETATION OF PHQ2 SCORE: 0

## 2021-03-31 ASSESSMENT — ENCOUNTER SYMPTOMS
NAUSEA: DENIES
VOMITING: DENIES

## 2021-04-01 ENCOUNTER — HOME CARE VISIT (OUTPATIENT)
Dept: HOME HEALTH SERVICES | Facility: HOME HEALTHCARE | Age: 86
End: 2021-04-01
Payer: MEDICARE

## 2021-04-01 VITALS
WEIGHT: 124 LBS | SYSTOLIC BLOOD PRESSURE: 114 MMHG | OXYGEN SATURATION: 97 % | BODY MASS INDEX: 22.68 KG/M2 | RESPIRATION RATE: 17 BRPM | HEART RATE: 65 BPM | DIASTOLIC BLOOD PRESSURE: 76 MMHG | TEMPERATURE: 97.4 F

## 2021-04-01 PROCEDURE — G0151 HHCP-SERV OF PT,EA 15 MIN: HCPCS

## 2021-04-01 ASSESSMENT — GAIT ASSESSMENTS
BALANCE AND GAIT SCORE: 18
WALKING STANCE: 0 - HEELS APART
TRUNK: 0 - MARKED SWAY OR USES WALKING AID
PATH SCORE: 1
INITIATION OF GAIT IMMEDIATELY AFTER GO: 1 - NO HESITANCY
STEP SYMMETRY: 1 - RIGHT AND LEFT STEP LENGTH APPEAR EQUAL
STEP CONTINUITY: 1 - STEPS APPEAR CONTINUOUS
TRUNK SCORE: 0
PATH: 1 - MILD/MODERATE DEVIATION OR USES WALKING AID
GAIT SCORE: 8

## 2021-04-01 ASSESSMENT — BALANCE ASSESSMENTS
SITTING BALANCE: 1 - STEADY, SAFE
BALANCE SCORE: 10
IMMEDIATE STANDING BALANCE FIRST 5 SECONDS: 1 - STEADY BUT USES WALKER OR OTHER SUPPORT
ARISES: 1 - ABLE, USES ARMS TO HELP
NUDGED SCORE: 1
TURNING 360 DEGREES STEPS: 1 - CONTINUOUS STEPS
SITTING DOWN: 1 - USES ARMS OR NOT SMOOTH MOTION
NUDGED: 1 - STAGGERS, GRABS, CATCHES SELF
STANDING BALANCE: 1 - STEADY BUT WIDE STANCE AND USES CANE OR OTHER SUPPORT
ATTEMPTS TO ARISE: 2 - ABLE TO RISE, ONE ATTEMPT
ARISING SCORE: 1
EYES CLOSED AT MAXIMUM POSITION NUDGED: 1 - STEADY

## 2021-04-01 ASSESSMENT — ACTIVITIES OF DAILY LIVING (ADL)
AMBULATION ASSISTANCE: STAND BY ASSIST
PHYSICAL TRANSFERS ASSESSED: 1
PHYSICAL_TRANSFERS_DEVICES: USE OF B UES TO ASSIST
CURRENT_FUNCTION: STAND BY ASSIST
FEEDING_COMMENTS: SEE OT EVAL
AMBULATION ASSISTANCE ON FLAT SURFACES: 1
BATHING_COMMENTS: SEE OT EVAL
AMBULATION ASSISTANCE: 1
GROOMING_COMMENTS: SEE OT EVAL

## 2021-04-01 ASSESSMENT — ENCOUNTER SYMPTOMS
MUSCLE WEAKNESS: 1
POOR JUDGMENT: 1

## 2021-04-02 ENCOUNTER — HOME CARE VISIT (OUTPATIENT)
Dept: HOME HEALTH SERVICES | Facility: HOME HEALTHCARE | Age: 86
End: 2021-04-02
Payer: MEDICARE

## 2021-04-02 ENCOUNTER — TELEPHONE (OUTPATIENT)
Dept: MEDICAL GROUP | Facility: PHYSICIAN GROUP | Age: 86
End: 2021-04-02

## 2021-04-02 VITALS
TEMPERATURE: 97.6 F | RESPIRATION RATE: 17 BRPM | OXYGEN SATURATION: 95 % | BODY MASS INDEX: 22.59 KG/M2 | DIASTOLIC BLOOD PRESSURE: 70 MMHG | HEART RATE: 67 BPM | SYSTOLIC BLOOD PRESSURE: 118 MMHG | WEIGHT: 123.5 LBS

## 2021-04-02 PROCEDURE — G0299 HHS/HOSPICE OF RN EA 15 MIN: HCPCS

## 2021-04-02 ASSESSMENT — ENCOUNTER SYMPTOMS
MUSCLE WEAKNESS: 1
NAUSEA: DENIES
VOMITING: DENIES

## 2021-04-02 NOTE — TELEPHONE ENCOUNTER
Medication chart review for [facility] services    PCP:  Toby Davis M.D.  [address]  [place] [address]  Fax: [phone]    Current medication list     Current Outpatient Medications:   •  furosemide, 20 mg, Oral, QDAY PRN  •  potassium chloride SA, 20 mEq, Oral, QDAY PRN  •  BABY ASPIRIN PO, 81 mg, Oral, DAILY AT 1800  •  EPINEPHrine (PRIMATENE MIST INH), 1 Inhalation, Inhalation, QDAY PRN  •  diclofenac sodium, 1 Application, Apply externally, QDAY PRN  •  B Complex-C-Folic Acid (HM VITAMIN B COMPLEX/VITAMIN C PO), 1 tablet, Oral, DAILY  •  Trulicity, 0.5 mL, Subcutaneous, Q7 DAYS  •  atorvastatin, 20 mg, Oral, Nightly  •  lisinopril, 20 mg, Oral, BID  •  metoprolol SR, 50 mg, Oral, DAILY  •  insulin glargine, 5 Units, Subcutaneous, Q EVENING  •  metformin, 1,000 mg, Oral, BID WITH MEALS  •  albuterol, 1-2 Puff, Inhalation, Q4HRS PRN (Patient not taking: Reported on 3/31/2021)  •  benzonatate, 100 mg, Oral, TID PRN (Patient not taking: Reported on 3/31/2021)  •  Spacer/Aero-Holding Chambers, 1 Device, Does not apply, PRN    No Known Allergies      Labs / Images Reviewed:   No results found for: SODIUM, POTASSIUM, CHLORIDE, CO2, GLUCOSE, BUN, CREATININE, BUNCREATRAT, GLOMRATE   No results found for: ALKPHOSPHAT, ASTSGOT, ALTSGPT, TBILIRUBIN, ALBUMIN, INR         Assessment and Plan:   • Received referral from [facility]. Medications reviewed. No clinically significant interactions noted.             Toan Reeves, PharmD, MS, BCACP, LCC  [facility]  Phone [phone] fax [phone]    This note was created using voice recognition software (Dragon). The accuracy of the dictation is limited by the abilities of the software. I have reviewed the note prior to signing, however some errors in grammar and context are still possible. If you have any questions related to this note please do not hesitate to contact our office.

## 2021-04-05 ENCOUNTER — HOME CARE VISIT (OUTPATIENT)
Dept: HOME HEALTH SERVICES | Facility: HOME HEALTHCARE | Age: 86
End: 2021-04-05
Payer: MEDICARE

## 2021-04-05 VITALS
HEART RATE: 68 BPM | TEMPERATURE: 97.6 F | RESPIRATION RATE: 17 BRPM | BODY MASS INDEX: 22.34 KG/M2 | DIASTOLIC BLOOD PRESSURE: 70 MMHG | WEIGHT: 122.13 LBS | SYSTOLIC BLOOD PRESSURE: 138 MMHG | OXYGEN SATURATION: 96 %

## 2021-04-05 PROCEDURE — G0299 HHS/HOSPICE OF RN EA 15 MIN: HCPCS

## 2021-04-05 PROCEDURE — G0151 HHCP-SERV OF PT,EA 15 MIN: HCPCS

## 2021-04-05 ASSESSMENT — ENCOUNTER SYMPTOMS
NAUSEA: DENIES
MUSCLE WEAKNESS: 1

## 2021-04-06 ENCOUNTER — HOME CARE VISIT (OUTPATIENT)
Dept: HOME HEALTH SERVICES | Facility: HOME HEALTHCARE | Age: 86
End: 2021-04-06
Payer: MEDICARE

## 2021-04-06 VITALS
WEIGHT: 122.13 LBS | BODY MASS INDEX: 22.34 KG/M2 | DIASTOLIC BLOOD PRESSURE: 70 MMHG | TEMPERATURE: 97.6 F | OXYGEN SATURATION: 96 % | SYSTOLIC BLOOD PRESSURE: 138 MMHG | HEART RATE: 68 BPM | RESPIRATION RATE: 17 BRPM

## 2021-04-06 PROCEDURE — G0152 HHCP-SERV OF OT,EA 15 MIN: HCPCS

## 2021-04-06 ASSESSMENT — ACTIVITIES OF DAILY LIVING (ADL): OASIS_M1830: 05

## 2021-04-06 NOTE — PROGRESS NOTES
Quality Review for 3.31.21 SOC OASIS performed on by DOMINGA Paige RN on 4.6., 2021:    Edits completed by DOMINGA Paige RN:  1. Labeled chart for HF/REMSA  2. Added poor ambulation to HB status per narrative  3. Changed , , ,  to 2,  to 1,  to 2,  to 3, [id number] C-H to 4, MA1350 D,F to 4 per narrative reporting pt needs supervision with ambulation, transfers, dressing and uses a walker. Changed  to 5 pt would need showering DME to safely perform  4. Changed  and  to 3 per ambulation score   5. Changed  to 8   6. Added up as tolerated to activities permitted. Added ambulate only with assistance, correct use of support devices, needle precautions, proper medication use, fall risk and high risk medication precautions to safety measures. Added heart healthy diet to nutritional requirements per dxs  7.  Added F2F data

## 2021-04-07 ENCOUNTER — HOME CARE VISIT (OUTPATIENT)
Dept: HOME HEALTH SERVICES | Facility: HOME HEALTHCARE | Age: 86
End: 2021-04-07
Payer: MEDICARE

## 2021-04-07 VITALS
TEMPERATURE: 97.7 F | WEIGHT: 123 LBS | DIASTOLIC BLOOD PRESSURE: 70 MMHG | RESPIRATION RATE: 17 BRPM | BODY MASS INDEX: 22.5 KG/M2 | HEART RATE: 84 BPM | SYSTOLIC BLOOD PRESSURE: 132 MMHG | OXYGEN SATURATION: 98 %

## 2021-04-07 VITALS
OXYGEN SATURATION: 98 % | HEART RATE: 67 BPM | RESPIRATION RATE: 16 BRPM | BODY MASS INDEX: 22.61 KG/M2 | WEIGHT: 123.6 LBS

## 2021-04-07 PROCEDURE — G0299 HHS/HOSPICE OF RN EA 15 MIN: HCPCS

## 2021-04-07 ASSESSMENT — ACTIVITIES OF DAILY LIVING (ADL)
PHYSICAL TRANSFERS ASSESSED: 1
PREPARING MEALS: NEEDS ASSISTANCE
GROOMING_CURRENT_FUNCTION: INDEPENDENT
GROOMING ASSESSED: 1
DRESSING_UB_CURRENT_FUNCTION: INDEPENDENT
LAUNDRY ASSESSED: 1
FEEDING_WITHIN_DEFINED_LIMITS: 1
LAUNDRY: NEEDS ASSISTANCE
BATHING ASSESSED: 1
TOILETING: SUPERVISION
FEEDING: INDEPENDENT
DRESSING_LB_CURRENT_FUNCTION: INDEPENDENT
AMBULATION ASSISTANCE: 1
TOILETING: 1
CURRENT_FUNCTION: STAND BY ASSIST
FEEDING ASSESSED: 1
AMBULATION ASSISTANCE: STAND BY ASSIST
GROOMING_WITHIN_DEFINED_LIMITS: 1

## 2021-04-07 ASSESSMENT — ENCOUNTER SYMPTOMS
MUSCLE WEAKNESS: 1
VOMITING: DENIES
NAUSEA: DENIES
DIFFICULTY THINKING: 1

## 2021-04-07 NOTE — PROGRESS NOTES
I agree with the changes made.  ----- Message -----  From: Nini Paige R.N.  Sent: 4/6/2021   2:46 PM PDT  To: Joi Hebert R.N.      Quality Review for 3.31.21 SOC OASIS performed on by DOMINGA Paige RN on 4.6., 2021:    Edits completed by DOMINGA Paige RN:  1. Labeled chart for HF/REMSA  2. Added poor ambulation to HB status per narrative  3. Changed , , ,  to 2,  to 1,  to 2,  to 3, CC5774 C-H to 4, [id number] D,F to 4 per narrative reporting pt needs supervision with ambulation, transfers, dressing and uses a walker. Changed  to 5 pt would need showering DME to safely perform  4. Changed  and  to 3 per ambulation score   5. Changed  to 8   6. Added up as tolerated to activities permitted. Added ambulate only with assistance, correct use of support devices, needle precautions, proper medication use, fall risk and high risk medication precautions to safety measures. Added heart healthy diet to nutritional requirements per dxs  7.  Added F2F data

## 2021-04-08 ENCOUNTER — HOME CARE VISIT (OUTPATIENT)
Dept: HOME HEALTH SERVICES | Facility: HOME HEALTHCARE | Age: 86
End: 2021-04-08
Payer: MEDICARE

## 2021-04-08 VITALS
SYSTOLIC BLOOD PRESSURE: 118 MMHG | BODY MASS INDEX: 22.68 KG/M2 | RESPIRATION RATE: 17 BRPM | HEART RATE: 65 BPM | DIASTOLIC BLOOD PRESSURE: 60 MMHG | TEMPERATURE: 97.9 F | WEIGHT: 124 LBS | OXYGEN SATURATION: 97 %

## 2021-04-08 PROCEDURE — G0157 HHC PT ASSISTANT EA 15: HCPCS | Mod: CQ

## 2021-04-09 ENCOUNTER — HOME CARE VISIT (OUTPATIENT)
Dept: HOME HEALTH SERVICES | Facility: HOME HEALTHCARE | Age: 86
End: 2021-04-09
Payer: MEDICARE

## 2021-04-09 VITALS
WEIGHT: 123 LBS | BODY MASS INDEX: 22.5 KG/M2 | TEMPERATURE: 98.1 F | DIASTOLIC BLOOD PRESSURE: 60 MMHG | RESPIRATION RATE: 17 BRPM | SYSTOLIC BLOOD PRESSURE: 108 MMHG | OXYGEN SATURATION: 98 % | HEART RATE: 65 BPM

## 2021-04-09 PROCEDURE — G0299 HHS/HOSPICE OF RN EA 15 MIN: HCPCS

## 2021-04-09 PROCEDURE — G0152 HHCP-SERV OF OT,EA 15 MIN: HCPCS

## 2021-04-09 ASSESSMENT — ENCOUNTER SYMPTOMS
NAUSEA: DENIES
MUSCLE WEAKNESS: 1
VOMITING: DENIES

## 2021-04-09 NOTE — PROGRESS NOTES
Sinai cont to perform task very quickly and needs to cuiing to try and slow down and pace herself. She was fearful of going outside but once she completed task she felt more comfortable. She is reporting a good carry over with HEP. Will cont with POC to increase her functional mob and ind to prevent further decline in IND. S/B Sejal Euceda PT

## 2021-04-11 VITALS
DIASTOLIC BLOOD PRESSURE: 60 MMHG | TEMPERATURE: 98.1 F | HEART RATE: 65 BPM | OXYGEN SATURATION: 98 % | SYSTOLIC BLOOD PRESSURE: 108 MMHG | RESPIRATION RATE: 17 BRPM

## 2021-04-12 ENCOUNTER — HOME CARE VISIT (OUTPATIENT)
Dept: HOME HEALTH SERVICES | Facility: HOME HEALTHCARE | Age: 86
End: 2021-04-12
Payer: MEDICARE

## 2021-04-12 ENCOUNTER — HOSPITAL ENCOUNTER (OUTPATIENT)
Facility: MEDICAL CENTER | Age: 86
End: 2021-04-12
Attending: INTERNAL MEDICINE
Payer: MEDICARE

## 2021-04-12 VITALS
BODY MASS INDEX: 22.34 KG/M2 | SYSTOLIC BLOOD PRESSURE: 128 MMHG | TEMPERATURE: 97.5 F | WEIGHT: 122.13 LBS | OXYGEN SATURATION: 98 % | DIASTOLIC BLOOD PRESSURE: 60 MMHG | HEART RATE: 64 BPM | RESPIRATION RATE: 16 BRPM

## 2021-04-12 LAB
ALBUMIN SERPL BCP-MCNC: 4.3 G/DL (ref 3.2–4.9)
ALBUMIN/GLOB SERPL: 2 G/DL
ALP SERPL-CCNC: 79 U/L (ref 30–99)
ALT SERPL-CCNC: 31 U/L (ref 2–50)
ANION GAP SERPL CALC-SCNC: 11 MMOL/L (ref 7–16)
APPEARANCE UR: CLEAR
AST SERPL-CCNC: 18 U/L (ref 12–45)
BASOPHILS # BLD AUTO: 1.3 % (ref 0–1.8)
BASOPHILS # BLD: 0.07 K/UL (ref 0–0.12)
BILIRUB SERPL-MCNC: 0.5 MG/DL (ref 0.1–1.5)
BILIRUB UR QL STRIP.AUTO: NEGATIVE
BUN SERPL-MCNC: 8 MG/DL (ref 8–22)
CALCIUM SERPL-MCNC: 9.3 MG/DL (ref 8.5–10.5)
CHLORIDE SERPL-SCNC: 97 MMOL/L (ref 96–112)
CO2 SERPL-SCNC: 25 MMOL/L (ref 20–33)
COLOR UR: YELLOW
CREAT SERPL-MCNC: 0.84 MG/DL (ref 0.5–1.4)
EOSINOPHIL # BLD AUTO: 0.27 K/UL (ref 0–0.51)
EOSINOPHIL NFR BLD: 4.9 % (ref 0–6.9)
ERYTHROCYTE [DISTWIDTH] IN BLOOD BY AUTOMATED COUNT: 55.4 FL (ref 35.9–50)
FASTING STATUS PATIENT QL REPORTED: NORMAL
GLOBULIN SER CALC-MCNC: 2.2 G/DL (ref 1.9–3.5)
GLUCOSE SERPL-MCNC: 154 MG/DL (ref 65–99)
GLUCOSE UR STRIP.AUTO-MCNC: NEGATIVE MG/DL
HCT VFR BLD AUTO: 41.7 % (ref 37–47)
HGB BLD-MCNC: 14.2 G/DL (ref 12–16)
IMM GRANULOCYTES # BLD AUTO: 0.01 K/UL (ref 0–0.11)
IMM GRANULOCYTES NFR BLD AUTO: 0.2 % (ref 0–0.9)
KETONES UR STRIP.AUTO-MCNC: NEGATIVE MG/DL
LEUKOCYTE ESTERASE UR QL STRIP.AUTO: NEGATIVE
LYMPHOCYTES # BLD AUTO: 1.8 K/UL (ref 1–4.8)
LYMPHOCYTES NFR BLD: 32.5 % (ref 22–41)
MCH RBC QN AUTO: 31.1 PG (ref 27–33)
MCHC RBC AUTO-ENTMCNC: 34.1 G/DL (ref 33.6–35)
MCV RBC AUTO: 91.2 FL (ref 81.4–97.8)
MICRO URNS: NORMAL
MONOCYTES # BLD AUTO: 0.49 K/UL (ref 0–0.85)
MONOCYTES NFR BLD AUTO: 8.8 % (ref 0–13.4)
NEUTROPHILS # BLD AUTO: 2.9 K/UL (ref 2–7.15)
NEUTROPHILS NFR BLD: 52.3 % (ref 44–72)
NITRITE UR QL STRIP.AUTO: NEGATIVE
NRBC # BLD AUTO: 0 K/UL
NRBC BLD-RTO: 0 /100 WBC
PH UR STRIP.AUTO: 6.5 [PH] (ref 5–8)
PLATELET # BLD AUTO: 218 K/UL (ref 164–446)
PMV BLD AUTO: 11.6 FL (ref 9–12.9)
POTASSIUM SERPL-SCNC: 4.2 MMOL/L (ref 3.6–5.5)
PROT SERPL-MCNC: 6.5 G/DL (ref 6–8.2)
PROT UR QL STRIP: NEGATIVE MG/DL
RBC # BLD AUTO: 4.57 M/UL (ref 4.2–5.4)
RBC UR QL AUTO: NEGATIVE
SODIUM SERPL-SCNC: 133 MMOL/L (ref 135–145)
SP GR UR STRIP.AUTO: 1.01
UROBILINOGEN UR STRIP.AUTO-MCNC: 0.2 MG/DL
WBC # BLD AUTO: 5.5 K/UL (ref 4.8–10.8)

## 2021-04-12 PROCEDURE — G0299 HHS/HOSPICE OF RN EA 15 MIN: HCPCS

## 2021-04-12 PROCEDURE — 83036 HEMOGLOBIN GLYCOSYLATED A1C: CPT

## 2021-04-12 PROCEDURE — 83880 ASSAY OF NATRIURETIC PEPTIDE: CPT

## 2021-04-12 PROCEDURE — 80053 COMPREHEN METABOLIC PANEL: CPT

## 2021-04-12 PROCEDURE — 81003 URINALYSIS AUTO W/O SCOPE: CPT

## 2021-04-12 PROCEDURE — 85025 COMPLETE CBC W/AUTO DIFF WBC: CPT

## 2021-04-12 ASSESSMENT — ENCOUNTER SYMPTOMS
VOMITING: DENIES
MUSCLE WEAKNESS: 1
NAUSEA: DENIES

## 2021-04-13 ENCOUNTER — HOME CARE VISIT (OUTPATIENT)
Dept: HOME HEALTH SERVICES | Facility: HOME HEALTHCARE | Age: 86
End: 2021-04-13
Payer: MEDICARE

## 2021-04-13 LAB
EST. AVERAGE GLUCOSE BLD GHB EST-MCNC: 194 MG/DL
HBA1C MFR BLD: 8.4 % (ref 4–5.6)
NT-PROBNP SERPL IA-MCNC: 8431 PG/ML (ref 0–125)

## 2021-04-14 ENCOUNTER — HOME CARE VISIT (OUTPATIENT)
Dept: HOME HEALTH SERVICES | Facility: HOME HEALTHCARE | Age: 86
End: 2021-04-14
Payer: MEDICARE

## 2021-04-14 VITALS
WEIGHT: 124 LBS | DIASTOLIC BLOOD PRESSURE: 70 MMHG | TEMPERATURE: 98 F | RESPIRATION RATE: 18 BRPM | OXYGEN SATURATION: 99 % | SYSTOLIC BLOOD PRESSURE: 132 MMHG | BODY MASS INDEX: 22.68 KG/M2 | HEART RATE: 76 BPM

## 2021-04-14 PROCEDURE — G0299 HHS/HOSPICE OF RN EA 15 MIN: HCPCS

## 2021-04-14 ASSESSMENT — ENCOUNTER SYMPTOMS
MUSCLE WEAKNESS: 1
VOMITING: DENIES
NAUSEA: DENIES

## 2021-04-14 ASSESSMENT — FIBROSIS 4 INDEX: FIB4 SCORE: 1.26

## 2021-04-15 ENCOUNTER — HOME CARE VISIT (OUTPATIENT)
Dept: HOME HEALTH SERVICES | Facility: HOME HEALTHCARE | Age: 86
End: 2021-04-15
Payer: MEDICARE

## 2021-04-15 VITALS
RESPIRATION RATE: 16 BRPM | TEMPERATURE: 97.8 F | SYSTOLIC BLOOD PRESSURE: 130 MMHG | DIASTOLIC BLOOD PRESSURE: 80 MMHG | WEIGHT: 121.38 LBS | OXYGEN SATURATION: 95 % | BODY MASS INDEX: 22.2 KG/M2 | HEART RATE: 75 BPM

## 2021-04-15 PROCEDURE — G0152 HHCP-SERV OF OT,EA 15 MIN: HCPCS

## 2021-04-15 ASSESSMENT — FIBROSIS 4 INDEX: FIB4 SCORE: 1.26

## 2021-04-19 ENCOUNTER — HOME CARE VISIT (OUTPATIENT)
Dept: HOME HEALTH SERVICES | Facility: HOME HEALTHCARE | Age: 86
End: 2021-04-19
Payer: MEDICARE

## 2021-04-19 VITALS
OXYGEN SATURATION: 94 % | WEIGHT: 121 LBS | DIASTOLIC BLOOD PRESSURE: 70 MMHG | RESPIRATION RATE: 18 BRPM | SYSTOLIC BLOOD PRESSURE: 124 MMHG | HEART RATE: 74 BPM | TEMPERATURE: 97.6 F | BODY MASS INDEX: 22.13 KG/M2

## 2021-04-19 VITALS
SYSTOLIC BLOOD PRESSURE: 124 MMHG | HEART RATE: 74 BPM | DIASTOLIC BLOOD PRESSURE: 70 MMHG | OXYGEN SATURATION: 94 % | WEIGHT: 124 LBS | BODY MASS INDEX: 22.68 KG/M2 | TEMPERATURE: 97.6 F | RESPIRATION RATE: 18 BRPM

## 2021-04-19 PROCEDURE — G0152 HHCP-SERV OF OT,EA 15 MIN: HCPCS

## 2021-04-19 PROCEDURE — G0151 HHCP-SERV OF PT,EA 15 MIN: HCPCS

## 2021-04-19 PROCEDURE — G0299 HHS/HOSPICE OF RN EA 15 MIN: HCPCS

## 2021-04-19 ASSESSMENT — ENCOUNTER SYMPTOMS
POOR JUDGMENT: 1
NAUSEA: DENIES
VOMITING: DENIES
MUSCLE WEAKNESS: 1

## 2021-04-19 ASSESSMENT — FIBROSIS 4 INDEX
FIB4 SCORE: 1.26
FIB4 SCORE: 1.26

## 2021-04-21 ENCOUNTER — HOME CARE VISIT (OUTPATIENT)
Dept: HOME HEALTH SERVICES | Facility: HOME HEALTHCARE | Age: 86
End: 2021-04-21
Payer: MEDICARE

## 2021-04-21 VITALS
OXYGEN SATURATION: 97 % | BODY MASS INDEX: 22.57 KG/M2 | HEART RATE: 72 BPM | DIASTOLIC BLOOD PRESSURE: 72 MMHG | WEIGHT: 123.38 LBS | SYSTOLIC BLOOD PRESSURE: 148 MMHG | RESPIRATION RATE: 17 BRPM | TEMPERATURE: 98.8 F

## 2021-04-21 VITALS
TEMPERATURE: 97.6 F | RESPIRATION RATE: 18 BRPM | OXYGEN SATURATION: 98 % | DIASTOLIC BLOOD PRESSURE: 70 MMHG | SYSTOLIC BLOOD PRESSURE: 124 MMHG | HEART RATE: 74 BPM

## 2021-04-21 PROCEDURE — G0151 HHCP-SERV OF PT,EA 15 MIN: HCPCS

## 2021-04-21 ASSESSMENT — GAIT ASSESSMENTS
STEP CONTINUITY: 1 - STEPS APPEAR CONTINUOUS
BALANCE AND GAIT SCORE: 21
INITIATION OF GAIT IMMEDIATELY AFTER GO: 1 - NO HESITANCY
STEP SYMMETRY: 1 - RIGHT AND LEFT STEP LENGTH APPEAR EQUAL
PATH: 1 - MILD/MODERATE DEVIATION OR USES WALKING AID
GAIT SCORE: 9
WALKING STANCE: 0 - HEELS APART
TRUNK SCORE: 1
PATH SCORE: 1
TRUNK: 1 - NO SWAY BUT FLEXION OF KNEES OR BACK OR SPREADS ARMS WHILE WALKING

## 2021-04-21 ASSESSMENT — BALANCE ASSESSMENTS
SITTING BALANCE: 1 - STEADY, SAFE
NUDGED SCORE: 2
NUDGED: 2 - STEADY
BALANCE SCORE: 12
EYES CLOSED AT MAXIMUM POSITION NUDGED: 1 - STEADY
IMMEDIATE STANDING BALANCE FIRST 5 SECONDS: 1 - STEADY BUT USES WALKER OR OTHER SUPPORT
SITTING DOWN: 1 - USES ARMS OR NOT SMOOTH MOTION
STANDING BALANCE: 1 - STEADY BUT WIDE STANCE AND USES CANE OR OTHER SUPPORT
ARISES: 1 - ABLE, USES ARMS TO HELP
ARISING SCORE: 1
TURNING 360 DEGREES STEPS: 1 - CONTINUOUS STEPS
ATTEMPTS TO ARISE: 2 - ABLE TO RISE, ONE ATTEMPT

## 2021-04-21 ASSESSMENT — ACTIVITIES OF DAILY LIVING (ADL)
CURRENT_FUNCTION: STAND BY ASSIST
FEEDING_COMMENTS: SEE OT NOTES
BATHING_COMMENTS: SEE OT NOTES
PHYSICAL TRANSFERS ASSESSED: 1
GROOMING_COMMENTS: SEE OT NOTES
PHYSICAL_TRANSFERS_DEVICES: USE OF B UES
AMBULATION ASSISTANCE ON FLAT SURFACES: 1
AMBULATION ASSISTANCE: STAND BY ASSIST
AMBULATION ASSISTANCE: 1

## 2021-04-21 ASSESSMENT — FIBROSIS 4 INDEX: FIB4 SCORE: 1.26

## 2021-04-21 ASSESSMENT — ENCOUNTER SYMPTOMS: POOR JUDGMENT: 1

## 2021-04-22 ENCOUNTER — HOME CARE VISIT (OUTPATIENT)
Dept: HOME HEALTH SERVICES | Facility: HOME HEALTHCARE | Age: 86
End: 2021-04-22
Payer: MEDICARE

## 2021-04-22 VITALS
HEART RATE: 64 BPM | DIASTOLIC BLOOD PRESSURE: 64 MMHG | SYSTOLIC BLOOD PRESSURE: 140 MMHG | OXYGEN SATURATION: 98 % | WEIGHT: 122.8 LBS | BODY MASS INDEX: 22.46 KG/M2 | TEMPERATURE: 97.5 F

## 2021-04-22 PROCEDURE — G0152 HHCP-SERV OF OT,EA 15 MIN: HCPCS

## 2021-04-22 PROCEDURE — G0495 RN CARE TRAIN/EDU IN HH: HCPCS

## 2021-04-22 ASSESSMENT — FIBROSIS 4 INDEX: FIB4 SCORE: 1.26

## 2021-04-22 ASSESSMENT — ENCOUNTER SYMPTOMS
MUSCLE WEAKNESS: 1
ARTHRALGIAS: 1

## 2021-04-23 VITALS
HEART RATE: 64 BPM | TEMPERATURE: 97.5 F | RESPIRATION RATE: 17 BRPM | DIASTOLIC BLOOD PRESSURE: 64 MMHG | OXYGEN SATURATION: 97 % | SYSTOLIC BLOOD PRESSURE: 110 MMHG

## 2021-04-23 ASSESSMENT — ENCOUNTER SYMPTOMS: DEBILITATING PAIN: 1

## 2021-04-26 ENCOUNTER — HOME CARE VISIT (OUTPATIENT)
Dept: HOME HEALTH SERVICES | Facility: HOME HEALTHCARE | Age: 86
End: 2021-04-26
Payer: MEDICARE

## 2021-04-26 VITALS
BODY MASS INDEX: 22.41 KG/M2 | RESPIRATION RATE: 18 BRPM | TEMPERATURE: 98.8 F | OXYGEN SATURATION: 94 % | HEART RATE: 62 BPM | WEIGHT: 122.5 LBS | SYSTOLIC BLOOD PRESSURE: 124 MMHG | DIASTOLIC BLOOD PRESSURE: 68 MMHG

## 2021-04-26 VITALS
TEMPERATURE: 97.2 F | SYSTOLIC BLOOD PRESSURE: 116 MMHG | RESPIRATION RATE: 18 BRPM | BODY MASS INDEX: 22.46 KG/M2 | HEART RATE: 60 BPM | DIASTOLIC BLOOD PRESSURE: 70 MMHG | WEIGHT: 122.8 LBS | OXYGEN SATURATION: 95 %

## 2021-04-26 PROCEDURE — G0152 HHCP-SERV OF OT,EA 15 MIN: HCPCS

## 2021-04-26 PROCEDURE — G0151 HHCP-SERV OF PT,EA 15 MIN: HCPCS

## 2021-04-26 PROCEDURE — G0495 RN CARE TRAIN/EDU IN HH: HCPCS

## 2021-04-26 ASSESSMENT — ENCOUNTER SYMPTOMS
DIFFICULTY THINKING: 1
MUSCLE WEAKNESS: 1

## 2021-04-26 ASSESSMENT — FIBROSIS 4 INDEX
FIB4 SCORE: 1.26
FIB4 SCORE: 1.26

## 2021-04-27 VITALS
RESPIRATION RATE: 18 BRPM | DIASTOLIC BLOOD PRESSURE: 68 MMHG | OXYGEN SATURATION: 94 % | HEART RATE: 62 BPM | TEMPERATURE: 98.8 F | SYSTOLIC BLOOD PRESSURE: 124 MMHG

## 2021-04-28 ENCOUNTER — HOME CARE VISIT (OUTPATIENT)
Dept: HOME HEALTH SERVICES | Facility: HOME HEALTHCARE | Age: 86
End: 2021-04-28
Payer: MEDICARE

## 2021-04-28 VITALS
SYSTOLIC BLOOD PRESSURE: 122 MMHG | TEMPERATURE: 99.4 F | DIASTOLIC BLOOD PRESSURE: 62 MMHG | BODY MASS INDEX: 22.5 KG/M2 | HEART RATE: 65 BPM | OXYGEN SATURATION: 95 % | RESPIRATION RATE: 18 BRPM | WEIGHT: 123 LBS

## 2021-04-28 PROCEDURE — G0151 HHCP-SERV OF PT,EA 15 MIN: HCPCS

## 2021-04-28 ASSESSMENT — FIBROSIS 4 INDEX: FIB4 SCORE: 1.26

## 2021-04-28 ASSESSMENT — ENCOUNTER SYMPTOMS: POOR JUDGMENT: 1

## 2021-04-29 ENCOUNTER — HOME CARE VISIT (OUTPATIENT)
Dept: HOME HEALTH SERVICES | Facility: HOME HEALTHCARE | Age: 86
End: 2021-04-29
Payer: MEDICARE

## 2021-04-29 VITALS
TEMPERATURE: 98.2 F | SYSTOLIC BLOOD PRESSURE: 126 MMHG | WEIGHT: 122 LBS | RESPIRATION RATE: 18 BRPM | OXYGEN SATURATION: 95 % | DIASTOLIC BLOOD PRESSURE: 72 MMHG | HEART RATE: 70 BPM | BODY MASS INDEX: 22.31 KG/M2

## 2021-04-29 PROCEDURE — G0495 RN CARE TRAIN/EDU IN HH: HCPCS

## 2021-04-29 PROCEDURE — G0152 HHCP-SERV OF OT,EA 15 MIN: HCPCS

## 2021-04-29 ASSESSMENT — ENCOUNTER SYMPTOMS
MUSCLE WEAKNESS: 1
ARTHRALGIAS: 1

## 2021-04-29 ASSESSMENT — FIBROSIS 4 INDEX: FIB4 SCORE: 1.26

## 2021-05-02 VITALS
RESPIRATION RATE: 18 BRPM | TEMPERATURE: 97.6 F | HEART RATE: 78 BPM | OXYGEN SATURATION: 98 % | DIASTOLIC BLOOD PRESSURE: 60 MMHG | SYSTOLIC BLOOD PRESSURE: 120 MMHG

## 2021-05-02 ASSESSMENT — ENCOUNTER SYMPTOMS: DIFFICULTY THINKING: 1

## 2021-05-03 ENCOUNTER — HOME CARE VISIT (OUTPATIENT)
Dept: HOME HEALTH SERVICES | Facility: HOME HEALTHCARE | Age: 86
End: 2021-05-03
Payer: MEDICARE

## 2021-05-03 VITALS
TEMPERATURE: 99 F | BODY MASS INDEX: 21.58 KG/M2 | WEIGHT: 118 LBS | HEART RATE: 62 BPM | OXYGEN SATURATION: 98 % | RESPIRATION RATE: 17 BRPM | DIASTOLIC BLOOD PRESSURE: 70 MMHG | SYSTOLIC BLOOD PRESSURE: 138 MMHG

## 2021-05-03 VITALS
SYSTOLIC BLOOD PRESSURE: 130 MMHG | RESPIRATION RATE: 18 BRPM | DIASTOLIC BLOOD PRESSURE: 60 MMHG | WEIGHT: 118 LBS | OXYGEN SATURATION: 98 % | HEART RATE: 68 BPM | BODY MASS INDEX: 21.58 KG/M2 | TEMPERATURE: 98 F

## 2021-05-03 PROCEDURE — G0299 HHS/HOSPICE OF RN EA 15 MIN: HCPCS

## 2021-05-03 PROCEDURE — G0151 HHCP-SERV OF PT,EA 15 MIN: HCPCS

## 2021-05-03 PROCEDURE — G0152 HHCP-SERV OF OT,EA 15 MIN: HCPCS

## 2021-05-03 PROCEDURE — 665001 SOC-HOME HEALTH

## 2021-05-03 ASSESSMENT — ENCOUNTER SYMPTOMS
NAUSEA: DENIES
POOR JUDGMENT: 1
MUSCLE WEAKNESS: 1
VOMITING: DENIES

## 2021-05-03 ASSESSMENT — FIBROSIS 4 INDEX
FIB4 SCORE: 1.26

## 2021-05-04 ENCOUNTER — PATIENT OUTREACH (OUTPATIENT)
Dept: HEALTH INFORMATION MANAGEMENT | Facility: OTHER | Age: 86
End: 2021-05-04

## 2021-05-04 NOTE — PROGRESS NOTES
Outcome: PT RQST CALL BACK AT A LATER TIME    Please transfer to Patient Outreach Team at [phone] when patient returns call.    WebIZ Checked & Epic Updated:  no    [facility]Connect Verified: no    Attempt # 1

## 2021-05-05 ENCOUNTER — HOME CARE VISIT (OUTPATIENT)
Dept: HOME HEALTH SERVICES | Facility: HOME HEALTHCARE | Age: 86
End: 2021-05-05
Payer: MEDICARE

## 2021-05-05 VITALS
BODY MASS INDEX: 21.77 KG/M2 | SYSTOLIC BLOOD PRESSURE: 128 MMHG | HEART RATE: 76 BPM | DIASTOLIC BLOOD PRESSURE: 72 MMHG | TEMPERATURE: 99 F | RESPIRATION RATE: 18 BRPM | OXYGEN SATURATION: 98 % | WEIGHT: 119 LBS

## 2021-05-05 PROCEDURE — G0151 HHCP-SERV OF PT,EA 15 MIN: HCPCS

## 2021-05-05 ASSESSMENT — FIBROSIS 4 INDEX: FIB4 SCORE: 1.26

## 2021-05-05 ASSESSMENT — ENCOUNTER SYMPTOMS: POOR JUDGMENT: 1

## 2021-05-06 ENCOUNTER — HOME CARE VISIT (OUTPATIENT)
Dept: HOME HEALTH SERVICES | Facility: HOME HEALTHCARE | Age: 86
End: 2021-05-06
Payer: MEDICARE

## 2021-05-06 VITALS
DIASTOLIC BLOOD PRESSURE: 70 MMHG | RESPIRATION RATE: 17 BRPM | WEIGHT: 118 LBS | HEART RATE: 62 BPM | OXYGEN SATURATION: 98 % | SYSTOLIC BLOOD PRESSURE: 138 MMHG | TEMPERATURE: 99 F | BODY MASS INDEX: 21.58 KG/M2

## 2021-05-06 PROCEDURE — G0152 HHCP-SERV OF OT,EA 15 MIN: HCPCS

## 2021-05-06 ASSESSMENT — FIBROSIS 4 INDEX: FIB4 SCORE: 1.26

## 2021-05-07 VITALS
HEART RATE: 73 BPM | OXYGEN SATURATION: 98 % | WEIGHT: 121 LBS | BODY MASS INDEX: 22.13 KG/M2 | SYSTOLIC BLOOD PRESSURE: 120 MMHG | DIASTOLIC BLOOD PRESSURE: 60 MMHG | RESPIRATION RATE: 18 BRPM | TEMPERATURE: 98.4 F

## 2021-05-10 ENCOUNTER — HOME CARE VISIT (OUTPATIENT)
Dept: HOME HEALTH SERVICES | Facility: HOME HEALTHCARE | Age: 86
End: 2021-05-10
Payer: MEDICARE

## 2021-05-10 VITALS
BODY MASS INDEX: 22.13 KG/M2 | DIASTOLIC BLOOD PRESSURE: 78 MMHG | TEMPERATURE: 97.9 F | HEART RATE: 69 BPM | WEIGHT: 121 LBS | SYSTOLIC BLOOD PRESSURE: 122 MMHG | OXYGEN SATURATION: 98 % | RESPIRATION RATE: 18 BRPM

## 2021-05-10 PROCEDURE — G0151 HHCP-SERV OF PT,EA 15 MIN: HCPCS

## 2021-05-10 ASSESSMENT — FIBROSIS 4 INDEX: FIB4 SCORE: 1.26

## 2021-05-10 ASSESSMENT — ENCOUNTER SYMPTOMS: DIFFICULTY THINKING: 1

## 2021-05-11 ENCOUNTER — HOME CARE VISIT (OUTPATIENT)
Dept: HOME HEALTH SERVICES | Facility: HOME HEALTHCARE | Age: 86
End: 2021-05-11
Payer: MEDICARE

## 2021-05-11 VITALS
OXYGEN SATURATION: 94 % | HEART RATE: 74 BPM | TEMPERATURE: 97.9 F | SYSTOLIC BLOOD PRESSURE: 110 MMHG | BODY MASS INDEX: 22.13 KG/M2 | DIASTOLIC BLOOD PRESSURE: 60 MMHG | WEIGHT: 121 LBS | RESPIRATION RATE: 18 BRPM

## 2021-05-11 PROCEDURE — G0299 HHS/HOSPICE OF RN EA 15 MIN: HCPCS

## 2021-05-11 ASSESSMENT — ENCOUNTER SYMPTOMS
NAUSEA: DENIES
MUSCLE WEAKNESS: 1
VOMITING: DENIES

## 2021-05-11 ASSESSMENT — FIBROSIS 4 INDEX: FIB4 SCORE: 1.26

## 2021-05-14 ENCOUNTER — HOME CARE VISIT (OUTPATIENT)
Dept: HOME HEALTH SERVICES | Facility: HOME HEALTHCARE | Age: 86
End: 2021-05-14
Payer: MEDICARE

## 2021-05-14 PROCEDURE — G0152 HHCP-SERV OF OT,EA 15 MIN: HCPCS

## 2021-05-16 VITALS
DIASTOLIC BLOOD PRESSURE: 54 MMHG | HEART RATE: 63 BPM | RESPIRATION RATE: 18 BRPM | TEMPERATURE: 97.7 F | OXYGEN SATURATION: 98 % | SYSTOLIC BLOOD PRESSURE: 118 MMHG

## 2021-05-17 ENCOUNTER — HOME CARE VISIT (OUTPATIENT)
Dept: HOME HEALTH SERVICES | Facility: HOME HEALTHCARE | Age: 86
End: 2021-05-17
Payer: MEDICARE

## 2021-05-17 VITALS
DIASTOLIC BLOOD PRESSURE: 68 MMHG | BODY MASS INDEX: 21.86 KG/M2 | OXYGEN SATURATION: 95 % | SYSTOLIC BLOOD PRESSURE: 128 MMHG | TEMPERATURE: 98 F | WEIGHT: 119.5 LBS | RESPIRATION RATE: 18 BRPM | HEART RATE: 69 BPM

## 2021-05-17 PROCEDURE — G0151 HHCP-SERV OF PT,EA 15 MIN: HCPCS

## 2021-05-17 ASSESSMENT — FIBROSIS 4 INDEX: FIB4 SCORE: 1.26

## 2021-05-17 ASSESSMENT — BALANCE ASSESSMENTS
ATTEMPTS TO ARISE: 2 - ABLE TO RISE, ONE ATTEMPT
BALANCE SCORE: 12
ARISES: 1 - ABLE, USES ARMS TO HELP
STANDING BALANCE: 1 - STEADY BUT WIDE STANCE AND USES CANE OR OTHER SUPPORT
TURNING 360 DEGREES STEPS: 1 - CONTINUOUS STEPS
SITTING DOWN: 1 - USES ARMS OR NOT SMOOTH MOTION
IMMEDIATE STANDING BALANCE FIRST 5 SECONDS: 2 - STEADY WITHOUT WALKER OR OTHER SUPPORT
ARISING SCORE: 1
NUDGED: 1 - STAGGERS, GRABS, CATCHES SELF
EYES CLOSED AT MAXIMUM POSITION NUDGED: 1 - STEADY
SITTING BALANCE: 1 - STEADY, SAFE
NUDGED SCORE: 1

## 2021-05-17 ASSESSMENT — GAIT ASSESSMENTS
STEP SYMMETRY: 1 - RIGHT AND LEFT STEP LENGTH APPEAR EQUAL
GAIT SCORE: 9
BALANCE AND GAIT SCORE: 21
PATH SCORE: 1
TRUNK: 1 - NO SWAY BUT FLEXION OF KNEES OR BACK OR SPREADS ARMS WHILE WALKING
PATH: 1 - MILD/MODERATE DEVIATION OR USES WALKING AID
STEP CONTINUITY: 1 - STEPS APPEAR CONTINUOUS
INITIATION OF GAIT IMMEDIATELY AFTER GO: 1 - NO HESITANCY
TRUNK SCORE: 1
WALKING STANCE: 0 - HEELS APART

## 2021-05-17 ASSESSMENT — ACTIVITIES OF DAILY LIVING (ADL)
BATHING_COMMENTS: SEE OT NOTES
AMBULATION ASSISTANCE: 1
PHYSICAL TRANSFERS ASSESSED: 1
AMBULATION ASSISTANCE: SUPERVISION
FEEDING_COMMENTS: SEE OT NOTES
CURRENT_FUNCTION: SUPERVISION
AMBULATION ASSISTANCE ON UNEVEN SURFACES: 1
PHYSICAL_TRANSFERS_DEVICES: USE OF B UES TO ASSIST
AMBULATION ASSISTANCE ON FLAT SURFACES: 1
GROOMING_COMMENTS: SEE OT NOTES

## 2021-05-17 ASSESSMENT — ENCOUNTER SYMPTOMS
DIFFICULTY THINKING: 1
POOR JUDGMENT: 1

## 2021-05-18 ENCOUNTER — HOME CARE VISIT (OUTPATIENT)
Dept: HOME HEALTH SERVICES | Facility: HOME HEALTHCARE | Age: 86
End: 2021-05-18
Payer: MEDICARE

## 2021-05-18 VITALS
WEIGHT: 120 LBS | BODY MASS INDEX: 21.95 KG/M2 | SYSTOLIC BLOOD PRESSURE: 120 MMHG | DIASTOLIC BLOOD PRESSURE: 60 MMHG | RESPIRATION RATE: 18 BRPM | OXYGEN SATURATION: 95 % | HEART RATE: 64 BPM | TEMPERATURE: 97.8 F

## 2021-05-18 PROCEDURE — G0299 HHS/HOSPICE OF RN EA 15 MIN: HCPCS

## 2021-05-18 ASSESSMENT — ACTIVITIES OF DAILY LIVING (ADL)
ORAL_CARE_ASSESSED: 1
CUTTING_FOOD_CURRENT_FUNCTION_INDEPENDENT: 1
FEEDING: INDEPENDENT
FEEDING ASSESSED: 1
ORAL_CARE_CURRENT_FUNCTION: INDEPENDENT
DRINKING_FROM_CUP_CURRENT_FUNCTIONINDEPENDENT: 1
WASHING_UPB_CURRENT_FUNCTION: INDEPENDENT
USING_UTENSILS_CURRENT_FUNCTIONINDEPENDENT: 1
FINGER_FOOD_CURRENT_FUNCTION_INDEPENDENT: 1

## 2021-05-18 ASSESSMENT — FIBROSIS 4 INDEX: FIB4 SCORE: 1.26

## 2021-05-21 ENCOUNTER — HOME CARE VISIT (OUTPATIENT)
Dept: HOME HEALTH SERVICES | Facility: HOME HEALTHCARE | Age: 86
End: 2021-05-21
Payer: MEDICARE

## 2021-05-21 VITALS
SYSTOLIC BLOOD PRESSURE: 130 MMHG | RESPIRATION RATE: 18 BRPM | TEMPERATURE: 97.7 F | DIASTOLIC BLOOD PRESSURE: 58 MMHG | OXYGEN SATURATION: 97 % | HEART RATE: 63 BPM

## 2021-05-21 PROCEDURE — G0152 HHCP-SERV OF OT,EA 15 MIN: HCPCS

## 2021-05-24 ENCOUNTER — HOME CARE VISIT (OUTPATIENT)
Dept: HOME HEALTH SERVICES | Facility: HOME HEALTHCARE | Age: 86
End: 2021-05-24
Payer: MEDICARE

## 2021-05-24 VITALS
WEIGHT: 121 LBS | HEART RATE: 72 BPM | BODY MASS INDEX: 22.13 KG/M2 | RESPIRATION RATE: 18 BRPM | OXYGEN SATURATION: 98 % | SYSTOLIC BLOOD PRESSURE: 132 MMHG | TEMPERATURE: 98.7 F | DIASTOLIC BLOOD PRESSURE: 70 MMHG

## 2021-05-24 PROCEDURE — G0151 HHCP-SERV OF PT,EA 15 MIN: HCPCS

## 2021-05-24 ASSESSMENT — BALANCE ASSESSMENTS
EYES CLOSED AT MAXIMUM POSITION NUDGED: 1 - STEADY
STANDING BALANCE: 1 - STEADY BUT WIDE STANCE AND USES CANE OR OTHER SUPPORT
BALANCE SCORE: 11
IMMEDIATE STANDING BALANCE FIRST 5 SECONDS: 1 - STEADY BUT USES WALKER OR OTHER SUPPORT
ATTEMPTS TO ARISE: 2 - ABLE TO RISE, ONE ATTEMPT
ARISING SCORE: 1
NUDGED: 1 - STAGGERS, GRABS, CATCHES SELF
SITTING DOWN: 1 - USES ARMS OR NOT SMOOTH MOTION
SITTING BALANCE: 1 - STEADY, SAFE
ARISES: 1 - ABLE, USES ARMS TO HELP
NUDGED SCORE: 1
TURNING 360 DEGREES STEPS: 1 - CONTINUOUS STEPS

## 2021-05-24 ASSESSMENT — ACTIVITIES OF DAILY LIVING (ADL)
OASIS_M1830: 04
AMBULATION ASSISTANCE ON FLAT SURFACES: 1
AMBULATION ASSISTANCE ON UNEVEN SURFACES: 1
HOME_HEALTH_OASIS: 00

## 2021-05-24 ASSESSMENT — GAIT ASSESSMENTS
WALKING STANCE: 0 - HEELS APART
STEP SYMMETRY: 1 - RIGHT AND LEFT STEP LENGTH APPEAR EQUAL
BALANCE AND GAIT SCORE: 20
GAIT SCORE: 9
TRUNK: 1 - NO SWAY BUT FLEXION OF KNEES OR BACK OR SPREADS ARMS WHILE WALKING
PATH SCORE: 1
TRUNK SCORE: 1
INITIATION OF GAIT IMMEDIATELY AFTER GO: 1 - NO HESITANCY
STEP CONTINUITY: 1 - STEPS APPEAR CONTINUOUS
PATH: 1 - MILD/MODERATE DEVIATION OR USES WALKING AID

## 2021-05-24 ASSESSMENT — FIBROSIS 4 INDEX: FIB4 SCORE: 1.26

## 2021-05-24 ASSESSMENT — PATIENT HEALTH QUESTIONNAIRE - PHQ9: CLINICAL INTERPRETATION OF PHQ2 SCORE: 0

## 2021-05-26 ENCOUNTER — HOME CARE VISIT (OUTPATIENT)
Dept: HOME HEALTH SERVICES | Facility: HOME HEALTHCARE | Age: 86
End: 2021-05-26
Payer: MEDICARE

## 2021-05-26 NOTE — CASE COMMUNICATION
Quality Review Completed for DC OASIS by VALERIE Shipman RN on May 26,  2021:    Edits completed by VALERIE Shipman RN:  1.  is yes to a. DM foot care per care plan interventions

## 2021-05-27 NOTE — CASE COMMUNICATION
I agree with these changes.  Thank you.    ----- Message -----  From: Suzanne Shipman R.N.  Sent: 5/26/2021   9:51 AM PDT  To: Amaya Rock, PT      Quality Review Completed for DC OASIS by VALERIE Shipman, RN on May 26,  2021:    Edits completed by VALERIE Shipman RN:  1.  is yes to a. DM foot care per care plan interventions

## 2021-11-13 NOTE — PROGRESS NOTES
Outcome: Left Message    Please transfer to Patient Outreach Team at [phone] when patient returns call.      Attempt # 2

## 2021-11-29 NOTE — PROGRESS NOTES
Medication chart review for [facility] services    PCP:  Toby Davis M.D.  [address]  [place] [address]  Fax: [phone]    Current medication list     Current Outpatient Medications:   •  metoprolol tartrate, 25 mg, Oral, BID  •  meclizine, 12.5 mg, Oral, QDAY PRN  •  Vitamin B6, 100 mg, Oral, DAILY  •  Multivitamin Women, 1 Tablet, Oral, DAILY  •  Home Care Oxygen, 1 L/min, Inhalation, PRN  •  Acetaminophen, 1,000 mg, Oral, Q6HRS PRN  •  ranolazine, 500 mg, Oral, BID  •  nitroglycerin, 0.4 mg, Sublingual, PRN  •  letrozole, 2.5 mg, Oral, DAILY  •  gabapentin, 200 mg, Oral, QHS  •  furosemide, 20 mg, Oral, QDAY PRN  •  potassium chloride SA, 20 mEq, Oral, QDAY PRN  •  BABY ASPIRIN PO, 81 mg, Oral, BID  •  EPINEPHrine (PRIMATENE MIST INH), 1 Inhalation, Inhalation, QDAY PRN  •  diclofenac sodium, 1 Application, Apply externally, QDAY PRN  •  atorvastatin, 20 mg, Oral, Nightly  •  lisinopril, 20 mg, Oral, BID  •  insulin glargine, 10 Units, Subcutaneous, Q EVENING  •  metformin, 1,000 mg, Oral, BID WITH MEALS  •  Spacer/Aero-Holding Chambers, 1 Device, Does not apply, PRN    No Known Allergies    Labs     Lab Results   Component Value Date/Time    SODIUM 133 (L) 04/12/2021 08:45 AM    POTASSIUM 4.2 04/12/2021 08:45 AM    CHLORIDE 97 04/12/2021 08:45 AM    CO2 25 04/12/2021 08:45 AM    GLUCOSE 154 (H) 04/12/2021 08:45 AM    BUN 8 04/12/2021 08:45 AM    CREATININE 0.84 04/12/2021 08:45 AM     Lab Results   Component Value Date/Time    ALKPHOSPHAT 79 04/12/2021 08:45 AM    ASTSGOT 18 04/12/2021 08:45 AM    ALTSGPT 31 04/12/2021 08:45 AM    TBILIRUBIN 0.5 04/12/2021 08:45 AM    ALBUMIN 4.3 04/12/2021 08:45 AM        Assessment and Plan:   • Received referral from [facility]. Medications reviewed.       Toan Reeves, PharmD, MS, BCACP, [facility]  Phone [phone] fax [phone]    This note was created using voice recognition software (Dragon). The  accuracy of the dictation is limited by the abilities of the software. I have reviewed the note prior to signing, however some errors in grammar and context are still possible. If you have any questions related to this note please do not hesitate to contact our office.

## 2021-11-30 NOTE — CASE COMMUNICATION
Quality Review for 11.24.21 SOC OASIS performed on by DOMINGA Paige RN on 11.30, 2021:    Edits completed by DOMINGA Paige RN:  1. Labeled chart for HF  2.Changed  to 4 per pain assessment pt has pain that affects sleep and physical activity  3. Changed  to 11.23.21 date of valid referral per admitting form  4. Changed  to 17  5. Changed [id number] C,E,F to 4 per narrative pt needs supervision with dressing and per ,  respo nse of 2 and  response of 5. Changed [id number] D,E,F,I, J to 4 per narrative pt needs supervision with transfer and per  response of 2.   6. Added high risk medication use, fall risk to safety measures  7.  Updated F2F data  8. Added HF to care plan, pt had acute exacerbation 8/3/21 and added HTN to care plan. Resolved duplicate interventions on the care plan

## 2021-12-01 NOTE — CASE COMMUNICATION
I agree with changes.  ----- Message -----  From: Nini Paige R.N.  Sent: 11/30/2021   3:27 PM PST  To: Adelaida Barroso PT      Quality Review for 11.24.21 SOC OASIS performed on by DOMINGA Paige, RN on 11.30, 2021:    Edits completed by DOMINGA Paige RN:  1. Labeled chart for HF  2.Changed  to 4 per pain assessment pt has pain that affects sleep and physical activity  3. Changed  to 11.23.21 date of valid referral per admitting for m  4. Changed  to 17  5. Changed [id number] C,E,F to 4 per narrative pt needs supervision with dressing and per ,  response of 2 and  response of 5. Changed [id number] D,E,F,I, J to 4 per narrative pt needs supervision with transfer and per  response of 2.   6. Added high risk medication use, fall risk to safety measures  7.  Updated F2F data  8. Added HF to care plan, pt had acute exacerbation 8/3/21 and added HTN to c are plan. Resolved duplicate interventions on the care plan

## 2021-12-20 NOTE — CASE COMMUNICATION
Please fax to Dr. Anderson     Falls Template         Date & Time of fall: 12/16/21 1:00pm           Cause of fall:  Physiological           Location:  Living Room           Was the fall witnessed?                  If yes, by who? No            Actions taken by patient:  Patient was able to reach phone to call son. Son was able to come home in about 10 minutes to assist patient off the floor. Patient reports no additional feelings of di zziness or numbness since.             Any new injury?                   If yes, what kind?  Yes, Details: bruising and scrape to left arm            Any recent medication changes?    No            Reviewed Post Fall Questionnaire:  Yes                 Post fall instructions:  Keep using 4WW, keep phone within reach            Actions Taken: Notified care team, Notified MD and Informed RN supervisor to schedule follow-up visit

## 2021-12-22 NOTE — CASE COMMUNICATION
Monserrat Ley, OT Joi Hebert R.N.       Please fax to Dr. Justin CARRERA Falls Template   Date & Time of fall: 12/16/21 1:00pm   Cause of fall: Physiological   Location: Living Room   Was the fall witnessed?   If yes, by who? No   Actions taken by patient: Patient was able to reach phone to call son. Son was able to come home in about 10 minutes to assist patient off the floor. Patient reports no additional feelings of dizziness or n umbness since.   Any new injury?   If yes, what kind? Yes, Details: bruising and scrape to left arm   Any recent medication changes? No   Reviewed Post Fall Questionnaire: Yes   Post fall instructions: Keep using 4WW, keep phone within reach   Actions Taken: Notified care team, Notified MD and Informed RN supervisor to schedule follow-up visit   Post GLF assessment-alert and oriented x 4, pt reports GLF on 12/16/21 at appx 1 pm, pt stat es I was walking, I got dizzy so  I leaned against the wall , slid down, I called my son and he was able to come home within 10 minutes and he helped me off the floor, denies any pain, pt reports scraping back of her arm, assessed left arm and noted old skin tear, no drainage noted, pt states she's been washing left arm with antibacterial, and covering with bandaid, denies dizziness, denies sob, vss  Post Fall instructions: instructed t o keep phone within reach, cont. using 4ww, call agency.[email or web address] for any changes in condition, verbalized understanding

## 2022-01-01 ENCOUNTER — HOME CARE VISIT (OUTPATIENT)
Dept: HOME HEALTH SERVICES | Facility: HOME HEALTHCARE | Age: 87
End: 2022-01-01
Payer: MEDICARE

## 2022-01-01 ENCOUNTER — OFFICE VISIT (OUTPATIENT)
Dept: CARDIOLOGY | Facility: PHYSICIAN GROUP | Age: 87
End: 2022-01-01
Payer: MEDICARE

## 2022-01-01 ENCOUNTER — HOME CARE VISIT (OUTPATIENT)
Dept: HOME HEALTH SERVICES | Facility: HOME HEALTHCARE | Age: 87
End: 2022-01-01

## 2022-01-01 ENCOUNTER — TELEPHONE (OUTPATIENT)
Dept: MEDICAL GROUP | Facility: PHYSICIAN GROUP | Age: 87
End: 2022-01-01
Payer: MEDICARE

## 2022-01-01 ENCOUNTER — ANTICOAGULATION MONITORING (OUTPATIENT)
Dept: MEDICAL GROUP | Facility: PHYSICIAN GROUP | Age: 87
End: 2022-01-01

## 2022-01-01 ENCOUNTER — OFFICE VISIT (OUTPATIENT)
Dept: MEDICAL GROUP | Facility: PHYSICIAN GROUP | Age: 87
End: 2022-01-01
Payer: MEDICARE

## 2022-01-01 ENCOUNTER — APPOINTMENT (OUTPATIENT)
Dept: MEDICAL GROUP | Facility: PHYSICIAN GROUP | Age: 87
End: 2022-01-01
Payer: MEDICARE

## 2022-01-01 ENCOUNTER — OFFICE VISIT (OUTPATIENT)
Dept: CARDIOLOGY | Facility: PHYSICIAN GROUP | Age: 87
End: 2022-01-01
Attending: NURSE PRACTITIONER
Payer: MEDICARE

## 2022-01-01 ENCOUNTER — TELEPHONE (OUTPATIENT)
Dept: CARDIOLOGY | Facility: MEDICAL CENTER | Age: 87
End: 2022-01-01
Payer: MEDICARE

## 2022-01-01 ENCOUNTER — HOME HEALTH ADMISSION (OUTPATIENT)
Dept: HOME HEALTH SERVICES | Facility: HOME HEALTHCARE | Age: 87
End: 2022-01-01
Payer: MEDICARE

## 2022-01-01 ENCOUNTER — TELEPHONE (OUTPATIENT)
Dept: MEDICAL GROUP | Facility: PHYSICIAN GROUP | Age: 87
End: 2022-01-01

## 2022-01-01 ENCOUNTER — HOME CARE VISIT (OUTPATIENT)
Dept: HOSPICE | Facility: HOSPICE | Age: 87
End: 2022-01-01
Payer: MEDICARE

## 2022-01-01 ENCOUNTER — TELEPHONE (OUTPATIENT)
Dept: VASCULAR LAB | Facility: MEDICAL CENTER | Age: 87
End: 2022-01-01
Payer: MEDICARE

## 2022-01-01 ENCOUNTER — NON-PROVIDER VISIT (OUTPATIENT)
Dept: MEDICAL GROUP | Facility: PHYSICIAN GROUP | Age: 87
End: 2022-01-01
Payer: MEDICARE

## 2022-01-01 ENCOUNTER — PHARMACY VISIT (OUTPATIENT)
Dept: PHARMACY | Facility: MEDICAL CENTER | Age: 87
End: 2022-01-01
Payer: COMMERCIAL

## 2022-01-01 ENCOUNTER — TELEPHONE (OUTPATIENT)
Dept: HEALTH INFORMATION MANAGEMENT | Facility: OTHER | Age: 87
End: 2022-01-01
Payer: MEDICARE

## 2022-01-01 ENCOUNTER — HOSPICE ADMISSION (OUTPATIENT)
Dept: HOSPICE | Facility: HOSPICE | Age: 87
End: 2022-01-01
Payer: MEDICARE

## 2022-01-01 ENCOUNTER — DOCUMENTATION (OUTPATIENT)
Dept: MEDICAL GROUP | Facility: PHYSICIAN GROUP | Age: 87
End: 2022-01-01
Payer: MEDICARE

## 2022-01-01 ENCOUNTER — TELEPHONE (OUTPATIENT)
Dept: URGENT CARE | Facility: CLINIC | Age: 87
End: 2022-01-01

## 2022-01-01 ENCOUNTER — DOCUMENTATION (OUTPATIENT)
Dept: VASCULAR LAB | Facility: MEDICAL CENTER | Age: 87
End: 2022-01-01

## 2022-01-01 VITALS
RESPIRATION RATE: 16 BRPM | OXYGEN SATURATION: 96 % | BODY MASS INDEX: 22.14 KG/M2 | TEMPERATURE: 97.7 F | SYSTOLIC BLOOD PRESSURE: 114 MMHG | HEART RATE: 62 BPM | DIASTOLIC BLOOD PRESSURE: 52 MMHG | WEIGHT: 113.38 LBS

## 2022-01-01 VITALS
DIASTOLIC BLOOD PRESSURE: 58 MMHG | SYSTOLIC BLOOD PRESSURE: 94 MMHG | TEMPERATURE: 97.3 F | RESPIRATION RATE: 16 BRPM | HEART RATE: 89 BPM | OXYGEN SATURATION: 91 % | BODY MASS INDEX: 22.62 KG/M2 | WEIGHT: 115.8 LBS

## 2022-01-01 VITALS
SYSTOLIC BLOOD PRESSURE: 118 MMHG | HEART RATE: 88 BPM | BODY MASS INDEX: 21.87 KG/M2 | OXYGEN SATURATION: 98 % | TEMPERATURE: 97.9 F | RESPIRATION RATE: 16 BRPM | WEIGHT: 112 LBS | DIASTOLIC BLOOD PRESSURE: 80 MMHG

## 2022-01-01 VITALS
SYSTOLIC BLOOD PRESSURE: 137 MMHG | DIASTOLIC BLOOD PRESSURE: 67 MMHG | RESPIRATION RATE: 18 BRPM | HEART RATE: 83 BPM | OXYGEN SATURATION: 91 % | TEMPERATURE: 98.7 F

## 2022-01-01 VITALS
TEMPERATURE: 97.8 F | DIASTOLIC BLOOD PRESSURE: 60 MMHG | SYSTOLIC BLOOD PRESSURE: 112 MMHG | OXYGEN SATURATION: 95 % | HEART RATE: 70 BPM | RESPIRATION RATE: 17 BRPM

## 2022-01-01 VITALS
SYSTOLIC BLOOD PRESSURE: 124 MMHG | TEMPERATURE: 98.3 F | RESPIRATION RATE: 16 BRPM | OXYGEN SATURATION: 97 % | DIASTOLIC BLOOD PRESSURE: 58 MMHG | HEART RATE: 62 BPM

## 2022-01-01 VITALS
TEMPERATURE: 98 F | HEART RATE: 76 BPM | DIASTOLIC BLOOD PRESSURE: 52 MMHG | OXYGEN SATURATION: 98 % | SYSTOLIC BLOOD PRESSURE: 100 MMHG | RESPIRATION RATE: 18 BRPM

## 2022-01-01 VITALS
HEIGHT: 60 IN | TEMPERATURE: 97.3 F | HEART RATE: 90 BPM | RESPIRATION RATE: 16 BRPM | DIASTOLIC BLOOD PRESSURE: 60 MMHG | OXYGEN SATURATION: 96 % | WEIGHT: 115.2 LBS | HEART RATE: 83 BPM | WEIGHT: 114.2 LBS | RESPIRATION RATE: 16 BRPM | HEIGHT: 60 IN | BODY MASS INDEX: 22.42 KG/M2 | BODY MASS INDEX: 22.62 KG/M2 | OXYGEN SATURATION: 96 % | TEMPERATURE: 97.1 F | SYSTOLIC BLOOD PRESSURE: 112 MMHG

## 2022-01-01 VITALS — HEART RATE: 97 BPM | SYSTOLIC BLOOD PRESSURE: 120 MMHG | RESPIRATION RATE: 28 BRPM | DIASTOLIC BLOOD PRESSURE: 90 MMHG

## 2022-01-01 VITALS
HEART RATE: 62 BPM | RESPIRATION RATE: 16 BRPM | DIASTOLIC BLOOD PRESSURE: 58 MMHG | TEMPERATURE: 97.4 F | WEIGHT: 113 LBS | BODY MASS INDEX: 22.07 KG/M2 | OXYGEN SATURATION: 95 % | SYSTOLIC BLOOD PRESSURE: 118 MMHG

## 2022-01-01 VITALS
TEMPERATURE: 97.9 F | OXYGEN SATURATION: 93 % | SYSTOLIC BLOOD PRESSURE: 118 MMHG | HEART RATE: 89 BPM | DIASTOLIC BLOOD PRESSURE: 60 MMHG | BODY MASS INDEX: 21.69 KG/M2 | WEIGHT: 111.06 LBS | RESPIRATION RATE: 18 BRPM

## 2022-01-01 VITALS
OXYGEN SATURATION: 99 % | DIASTOLIC BLOOD PRESSURE: 56 MMHG | HEART RATE: 68 BPM | TEMPERATURE: 97.9 F | SYSTOLIC BLOOD PRESSURE: 104 MMHG | RESPIRATION RATE: 18 BRPM

## 2022-01-01 VITALS
SYSTOLIC BLOOD PRESSURE: 118 MMHG | HEART RATE: 78 BPM | DIASTOLIC BLOOD PRESSURE: 52 MMHG | BODY MASS INDEX: 22.85 KG/M2 | WEIGHT: 117 LBS

## 2022-01-01 VITALS
OXYGEN SATURATION: 96 % | SYSTOLIC BLOOD PRESSURE: 130 MMHG | HEART RATE: 73 BPM | DIASTOLIC BLOOD PRESSURE: 70 MMHG | RESPIRATION RATE: 16 BRPM | TEMPERATURE: 97.6 F | WEIGHT: 111.25 LBS | BODY MASS INDEX: 21.73 KG/M2

## 2022-01-01 VITALS
TEMPERATURE: 97.9 F | DIASTOLIC BLOOD PRESSURE: 60 MMHG | HEART RATE: 89 BPM | RESPIRATION RATE: 18 BRPM | SYSTOLIC BLOOD PRESSURE: 118 MMHG | OXYGEN SATURATION: 93 %

## 2022-01-01 VITALS
DIASTOLIC BLOOD PRESSURE: 58 MMHG | TEMPERATURE: 97.9 F | RESPIRATION RATE: 16 BRPM | SYSTOLIC BLOOD PRESSURE: 130 MMHG | HEART RATE: 66 BPM | OXYGEN SATURATION: 95 %

## 2022-01-01 VITALS
OXYGEN SATURATION: 99 % | OXYGEN SATURATION: 93 % | OXYGEN SATURATION: 95 % | DIASTOLIC BLOOD PRESSURE: 58 MMHG | SYSTOLIC BLOOD PRESSURE: 116 MMHG | SYSTOLIC BLOOD PRESSURE: 128 MMHG | DIASTOLIC BLOOD PRESSURE: 60 MMHG | DIASTOLIC BLOOD PRESSURE: 60 MMHG | RESPIRATION RATE: 16 BRPM | BODY MASS INDEX: 21.75 KG/M2 | RESPIRATION RATE: 18 BRPM | TEMPERATURE: 97.6 F | TEMPERATURE: 97.4 F | HEART RATE: 74 BPM | HEART RATE: 71 BPM | SYSTOLIC BLOOD PRESSURE: 106 MMHG | HEART RATE: 86 BPM | RESPIRATION RATE: 16 BRPM | WEIGHT: 111.38 LBS | TEMPERATURE: 97.8 F

## 2022-01-01 VITALS
DIASTOLIC BLOOD PRESSURE: 60 MMHG | SYSTOLIC BLOOD PRESSURE: 128 MMHG | RESPIRATION RATE: 16 BRPM | OXYGEN SATURATION: 95 % | TEMPERATURE: 97.4 F | HEART RATE: 71 BPM | WEIGHT: 115 LBS | BODY MASS INDEX: 22.46 KG/M2

## 2022-01-01 VITALS
WEIGHT: 111.25 LBS | DIASTOLIC BLOOD PRESSURE: 60 MMHG | SYSTOLIC BLOOD PRESSURE: 118 MMHG | RESPIRATION RATE: 16 BRPM | BODY MASS INDEX: 21.73 KG/M2 | HEART RATE: 83 BPM | OXYGEN SATURATION: 95 % | TEMPERATURE: 97.5 F

## 2022-01-01 VITALS
DIASTOLIC BLOOD PRESSURE: 68 MMHG | OXYGEN SATURATION: 98 % | SYSTOLIC BLOOD PRESSURE: 130 MMHG | BODY MASS INDEX: 21.68 KG/M2 | HEART RATE: 89 BPM | OXYGEN SATURATION: 97 % | DIASTOLIC BLOOD PRESSURE: 64 MMHG | TEMPERATURE: 97.5 F | WEIGHT: 112.43 LBS | SYSTOLIC BLOOD PRESSURE: 120 MMHG | WEIGHT: 111 LBS | HEART RATE: 79 BPM | BODY MASS INDEX: 22.07 KG/M2 | RESPIRATION RATE: 16 BRPM | HEIGHT: 60 IN | RESPIRATION RATE: 16 BRPM

## 2022-01-01 VITALS
BODY MASS INDEX: 22.26 KG/M2 | RESPIRATION RATE: 16 BRPM | DIASTOLIC BLOOD PRESSURE: 60 MMHG | OXYGEN SATURATION: 100 % | SYSTOLIC BLOOD PRESSURE: 110 MMHG | WEIGHT: 114 LBS | TEMPERATURE: 98.3 F | HEART RATE: 61 BPM

## 2022-01-01 VITALS
DIASTOLIC BLOOD PRESSURE: 60 MMHG | WEIGHT: 112 LBS | BODY MASS INDEX: 21.87 KG/M2 | RESPIRATION RATE: 18 BRPM | TEMPERATURE: 97.6 F | OXYGEN SATURATION: 18 % | HEART RATE: 74 BPM | SYSTOLIC BLOOD PRESSURE: 104 MMHG

## 2022-01-01 VITALS
HEART RATE: 72 BPM | SYSTOLIC BLOOD PRESSURE: 134 MMHG | DIASTOLIC BLOOD PRESSURE: 72 MMHG | WEIGHT: 113.13 LBS | RESPIRATION RATE: 16 BRPM | OXYGEN SATURATION: 99 % | TEMPERATURE: 97.7 F | BODY MASS INDEX: 22.09 KG/M2

## 2022-01-01 VITALS
RESPIRATION RATE: 17 BRPM | DIASTOLIC BLOOD PRESSURE: 70 MMHG | OXYGEN SATURATION: 94 % | SYSTOLIC BLOOD PRESSURE: 118 MMHG | HEART RATE: 90 BPM | TEMPERATURE: 98.5 F

## 2022-01-01 VITALS
SYSTOLIC BLOOD PRESSURE: 118 MMHG | RESPIRATION RATE: 16 BRPM | OXYGEN SATURATION: 95 % | DIASTOLIC BLOOD PRESSURE: 60 MMHG | TEMPERATURE: 97.5 F | HEART RATE: 83 BPM

## 2022-01-01 VITALS
OXYGEN SATURATION: 95 % | TEMPERATURE: 97.4 F | DIASTOLIC BLOOD PRESSURE: 60 MMHG | BODY MASS INDEX: 22.31 KG/M2 | TEMPERATURE: 97.8 F | RESPIRATION RATE: 18 BRPM | RESPIRATION RATE: 17 BRPM | DIASTOLIC BLOOD PRESSURE: 52 MMHG | WEIGHT: 112.13 LBS | SYSTOLIC BLOOD PRESSURE: 104 MMHG | HEART RATE: 86 BPM | SYSTOLIC BLOOD PRESSURE: 112 MMHG | HEART RATE: 70 BPM | BODY MASS INDEX: 21.9 KG/M2 | WEIGHT: 114.25 LBS | OXYGEN SATURATION: 97 %

## 2022-01-01 VITALS
DIASTOLIC BLOOD PRESSURE: 58 MMHG | BODY MASS INDEX: 21.9 KG/M2 | RESPIRATION RATE: 16 BRPM | OXYGEN SATURATION: 97 % | WEIGHT: 112.13 LBS | TEMPERATURE: 98.3 F | SYSTOLIC BLOOD PRESSURE: 112 MMHG | HEART RATE: 77 BPM

## 2022-01-01 VITALS
OXYGEN SATURATION: 98 % | SYSTOLIC BLOOD PRESSURE: 120 MMHG | TEMPERATURE: 97.5 F | WEIGHT: 111 LBS | HEART RATE: 79 BPM | DIASTOLIC BLOOD PRESSURE: 64 MMHG | BODY MASS INDEX: 21.68 KG/M2 | RESPIRATION RATE: 16 BRPM

## 2022-01-01 VITALS
HEART RATE: 62 BPM | DIASTOLIC BLOOD PRESSURE: 52 MMHG | RESPIRATION RATE: 16 BRPM | TEMPERATURE: 97.1 F | OXYGEN SATURATION: 98 % | SYSTOLIC BLOOD PRESSURE: 102 MMHG

## 2022-01-01 VITALS
RESPIRATION RATE: 18 BRPM | WEIGHT: 112.25 LBS | OXYGEN SATURATION: 95 % | DIASTOLIC BLOOD PRESSURE: 58 MMHG | HEART RATE: 76 BPM | SYSTOLIC BLOOD PRESSURE: 102 MMHG | BODY MASS INDEX: 21.92 KG/M2 | TEMPERATURE: 97.6 F

## 2022-01-01 VITALS
TEMPERATURE: 97.9 F | WEIGHT: 113 LBS | OXYGEN SATURATION: 96 % | SYSTOLIC BLOOD PRESSURE: 108 MMHG | RESPIRATION RATE: 18 BRPM | DIASTOLIC BLOOD PRESSURE: 62 MMHG | BODY MASS INDEX: 22.19 KG/M2 | HEART RATE: 73 BPM | HEIGHT: 60 IN

## 2022-01-01 VITALS
OXYGEN SATURATION: 92 % | HEART RATE: 89 BPM | WEIGHT: 111.38 LBS | RESPIRATION RATE: 16 BRPM | TEMPERATURE: 97.8 F | SYSTOLIC BLOOD PRESSURE: 132 MMHG | BODY MASS INDEX: 21.75 KG/M2 | DIASTOLIC BLOOD PRESSURE: 60 MMHG

## 2022-01-01 VITALS
HEART RATE: 64 BPM | RESPIRATION RATE: 16 BRPM | TEMPERATURE: 97.4 F | SYSTOLIC BLOOD PRESSURE: 121 MMHG | DIASTOLIC BLOOD PRESSURE: 70 MMHG

## 2022-01-01 VITALS
TEMPERATURE: 97.8 F | WEIGHT: 112.13 LBS | DIASTOLIC BLOOD PRESSURE: 60 MMHG | HEART RATE: 70 BPM | OXYGEN SATURATION: 95 % | RESPIRATION RATE: 16 BRPM | BODY MASS INDEX: 21.9 KG/M2 | SYSTOLIC BLOOD PRESSURE: 112 MMHG

## 2022-01-01 VITALS
OXYGEN SATURATION: 96 % | DIASTOLIC BLOOD PRESSURE: 62 MMHG | HEART RATE: 71 BPM | WEIGHT: 113 LBS | SYSTOLIC BLOOD PRESSURE: 118 MMHG | TEMPERATURE: 97.6 F | RESPIRATION RATE: 16 BRPM | BODY MASS INDEX: 22.07 KG/M2

## 2022-01-01 VITALS
TEMPERATURE: 97.8 F | WEIGHT: 114.38 LBS | BODY MASS INDEX: 22.34 KG/M2 | RESPIRATION RATE: 16 BRPM | SYSTOLIC BLOOD PRESSURE: 110 MMHG | WEIGHT: 111 LBS | OXYGEN SATURATION: 95 % | BODY MASS INDEX: 21.68 KG/M2 | HEART RATE: 68 BPM | DIASTOLIC BLOOD PRESSURE: 60 MMHG

## 2022-01-01 VITALS
SYSTOLIC BLOOD PRESSURE: 116 MMHG | DIASTOLIC BLOOD PRESSURE: 58 MMHG | HEART RATE: 86 BPM | RESPIRATION RATE: 16 BRPM | BODY MASS INDEX: 21.92 KG/M2 | WEIGHT: 112.25 LBS | TEMPERATURE: 97.8 F | OXYGEN SATURATION: 99 %

## 2022-01-01 VITALS
OXYGEN SATURATION: 99 % | RESPIRATION RATE: 16 BRPM | HEART RATE: 72 BPM | TEMPERATURE: 97.7 F | DIASTOLIC BLOOD PRESSURE: 72 MMHG | SYSTOLIC BLOOD PRESSURE: 134 MMHG

## 2022-01-01 VITALS
BODY MASS INDEX: 22.42 KG/M2 | OXYGEN SATURATION: 95 % | DIASTOLIC BLOOD PRESSURE: 50 MMHG | SYSTOLIC BLOOD PRESSURE: 90 MMHG | HEART RATE: 74 BPM | WEIGHT: 114.8 LBS | RESPIRATION RATE: 16 BRPM | TEMPERATURE: 98.3 F

## 2022-01-01 VITALS
HEART RATE: 87 BPM | TEMPERATURE: 98.5 F | DIASTOLIC BLOOD PRESSURE: 58 MMHG | DIASTOLIC BLOOD PRESSURE: 70 MMHG | OXYGEN SATURATION: 98 % | RESPIRATION RATE: 28 BRPM | SYSTOLIC BLOOD PRESSURE: 140 MMHG | SYSTOLIC BLOOD PRESSURE: 90 MMHG | RESPIRATION RATE: 18 BRPM | HEART RATE: 108 BPM

## 2022-01-01 VITALS
TEMPERATURE: 97.7 F | DIASTOLIC BLOOD PRESSURE: 66 MMHG | HEART RATE: 73 BPM | WEIGHT: 112.38 LBS | BODY MASS INDEX: 21.95 KG/M2 | OXYGEN SATURATION: 97 % | SYSTOLIC BLOOD PRESSURE: 130 MMHG | RESPIRATION RATE: 16 BRPM

## 2022-01-01 VITALS
DIASTOLIC BLOOD PRESSURE: 54 MMHG | WEIGHT: 113.25 LBS | TEMPERATURE: 98.2 F | SYSTOLIC BLOOD PRESSURE: 122 MMHG | HEART RATE: 88 BPM | RESPIRATION RATE: 16 BRPM | BODY MASS INDEX: 22.12 KG/M2 | OXYGEN SATURATION: 98 %

## 2022-01-01 VITALS
SYSTOLIC BLOOD PRESSURE: 120 MMHG | DIASTOLIC BLOOD PRESSURE: 60 MMHG | HEART RATE: 68 BPM | OXYGEN SATURATION: 95 % | RESPIRATION RATE: 16 BRPM | TEMPERATURE: 97.6 F

## 2022-01-01 VITALS
RESPIRATION RATE: 17 BRPM | TEMPERATURE: 97.4 F | HEART RATE: 79 BPM | DIASTOLIC BLOOD PRESSURE: 60 MMHG | BODY MASS INDEX: 23.67 KG/M2 | OXYGEN SATURATION: 93 % | WEIGHT: 109.69 LBS | DIASTOLIC BLOOD PRESSURE: 54 MMHG | BODY MASS INDEX: 21.42 KG/M2 | SYSTOLIC BLOOD PRESSURE: 123 MMHG | OXYGEN SATURATION: 75 % | TEMPERATURE: 98 F | HEART RATE: 75 BPM | RESPIRATION RATE: 16 BRPM | WEIGHT: 121.2 LBS | SYSTOLIC BLOOD PRESSURE: 102 MMHG

## 2022-01-01 VITALS
OXYGEN SATURATION: 99 % | HEART RATE: 66 BPM | DIASTOLIC BLOOD PRESSURE: 66 MMHG | RESPIRATION RATE: 16 BRPM | SYSTOLIC BLOOD PRESSURE: 110 MMHG | TEMPERATURE: 97.2 F

## 2022-01-01 VITALS
TEMPERATURE: 98 F | WEIGHT: 110.25 LBS | RESPIRATION RATE: 16 BRPM | OXYGEN SATURATION: 95 % | DIASTOLIC BLOOD PRESSURE: 60 MMHG | BODY MASS INDEX: 21.53 KG/M2 | SYSTOLIC BLOOD PRESSURE: 102 MMHG

## 2022-01-01 VITALS
DIASTOLIC BLOOD PRESSURE: 56 MMHG | WEIGHT: 114.38 LBS | OXYGEN SATURATION: 99 % | SYSTOLIC BLOOD PRESSURE: 104 MMHG | RESPIRATION RATE: 18 BRPM | BODY MASS INDEX: 22.34 KG/M2 | HEART RATE: 68 BPM | TEMPERATURE: 97.9 F

## 2022-01-01 VITALS
WEIGHT: 107.2 LBS | RESPIRATION RATE: 16 BRPM | DIASTOLIC BLOOD PRESSURE: 60 MMHG | TEMPERATURE: 97.6 F | SYSTOLIC BLOOD PRESSURE: 98 MMHG | BODY MASS INDEX: 20.94 KG/M2 | HEART RATE: 78 BPM | OXYGEN SATURATION: 93 %

## 2022-01-01 VITALS
DIASTOLIC BLOOD PRESSURE: 68 MMHG | BODY MASS INDEX: 21.87 KG/M2 | WEIGHT: 112 LBS | OXYGEN SATURATION: 98 % | TEMPERATURE: 97.8 F | SYSTOLIC BLOOD PRESSURE: 132 MMHG | RESPIRATION RATE: 16 BRPM | HEART RATE: 72 BPM

## 2022-01-01 VITALS
RESPIRATION RATE: 16 BRPM | DIASTOLIC BLOOD PRESSURE: 60 MMHG | TEMPERATURE: 97.8 F | HEART RATE: 66 BPM | OXYGEN SATURATION: 95 % | SYSTOLIC BLOOD PRESSURE: 120 MMHG

## 2022-01-01 VITALS
SYSTOLIC BLOOD PRESSURE: 104 MMHG | DIASTOLIC BLOOD PRESSURE: 60 MMHG | BODY MASS INDEX: 22.34 KG/M2 | WEIGHT: 114.38 LBS | OXYGEN SATURATION: 95 % | TEMPERATURE: 97.6 F | HEART RATE: 76 BPM | RESPIRATION RATE: 18 BRPM

## 2022-01-01 VITALS
WEIGHT: 114 LBS | BODY MASS INDEX: 22.26 KG/M2 | HEART RATE: 76 BPM | RESPIRATION RATE: 18 BRPM | OXYGEN SATURATION: 95 % | DIASTOLIC BLOOD PRESSURE: 60 MMHG | SYSTOLIC BLOOD PRESSURE: 104 MMHG | TEMPERATURE: 97.6 F

## 2022-01-01 VITALS
DIASTOLIC BLOOD PRESSURE: 70 MMHG | BODY MASS INDEX: 22.19 KG/M2 | SYSTOLIC BLOOD PRESSURE: 118 MMHG | RESPIRATION RATE: 17 BRPM | BODY MASS INDEX: 21.7 KG/M2 | OXYGEN SATURATION: 94 % | OXYGEN SATURATION: 94 % | TEMPERATURE: 98.4 F | HEART RATE: 74 BPM | HEART RATE: 90 BPM | RESPIRATION RATE: 18 BRPM | TEMPERATURE: 98.5 F | DIASTOLIC BLOOD PRESSURE: 60 MMHG | SYSTOLIC BLOOD PRESSURE: 130 MMHG | HEIGHT: 60 IN | WEIGHT: 113 LBS | WEIGHT: 111.13 LBS

## 2022-01-01 VITALS
HEART RATE: 77 BPM | TEMPERATURE: 97.4 F | DIASTOLIC BLOOD PRESSURE: 54 MMHG | SYSTOLIC BLOOD PRESSURE: 92 MMHG | BODY MASS INDEX: 22.46 KG/M2 | WEIGHT: 115 LBS | OXYGEN SATURATION: 98 % | RESPIRATION RATE: 16 BRPM

## 2022-01-01 VITALS
HEART RATE: 80 BPM | DIASTOLIC BLOOD PRESSURE: 58 MMHG | TEMPERATURE: 98 F | BODY MASS INDEX: 22.28 KG/M2 | RESPIRATION RATE: 16 BRPM | WEIGHT: 114.06 LBS | OXYGEN SATURATION: 98 % | SYSTOLIC BLOOD PRESSURE: 118 MMHG

## 2022-01-01 VITALS
WEIGHT: 112 LBS | TEMPERATURE: 97.8 F | SYSTOLIC BLOOD PRESSURE: 122 MMHG | OXYGEN SATURATION: 94 % | HEART RATE: 88 BPM | DIASTOLIC BLOOD PRESSURE: 60 MMHG | SYSTOLIC BLOOD PRESSURE: 104 MMHG | BODY MASS INDEX: 21.87 KG/M2 | TEMPERATURE: 98.2 F | RESPIRATION RATE: 18 BRPM | DIASTOLIC BLOOD PRESSURE: 54 MMHG | RESPIRATION RATE: 16 BRPM | HEART RATE: 87 BPM | OXYGEN SATURATION: 98 %

## 2022-01-01 VITALS
TEMPERATURE: 98 F | SYSTOLIC BLOOD PRESSURE: 102 MMHG | HEART RATE: 76 BPM | DIASTOLIC BLOOD PRESSURE: 60 MMHG | OXYGEN SATURATION: 95 % | RESPIRATION RATE: 18 BRPM

## 2022-01-01 VITALS
DIASTOLIC BLOOD PRESSURE: 66 MMHG | HEART RATE: 61 BPM | HEART RATE: 71 BPM | OXYGEN SATURATION: 95 % | TEMPERATURE: 97.4 F | SYSTOLIC BLOOD PRESSURE: 128 MMHG | SYSTOLIC BLOOD PRESSURE: 128 MMHG | BODY MASS INDEX: 22.07 KG/M2 | RESPIRATION RATE: 16 BRPM | TEMPERATURE: 97.6 F | OXYGEN SATURATION: 92 % | DIASTOLIC BLOOD PRESSURE: 60 MMHG | RESPIRATION RATE: 16 BRPM | WEIGHT: 113 LBS

## 2022-01-01 VITALS
TEMPERATURE: 98.5 F | SYSTOLIC BLOOD PRESSURE: 94 MMHG | OXYGEN SATURATION: 95 % | RESPIRATION RATE: 18 BRPM | WEIGHT: 115 LBS | BODY MASS INDEX: 22.46 KG/M2 | HEART RATE: 87 BPM | DIASTOLIC BLOOD PRESSURE: 56 MMHG

## 2022-01-01 VITALS
HEART RATE: 76 BPM | BODY MASS INDEX: 22.46 KG/M2 | OXYGEN SATURATION: 95 % | SYSTOLIC BLOOD PRESSURE: 118 MMHG | WEIGHT: 115 LBS | TEMPERATURE: 97.6 F | RESPIRATION RATE: 18 BRPM | DIASTOLIC BLOOD PRESSURE: 60 MMHG

## 2022-01-01 VITALS
HEART RATE: 82 BPM | SYSTOLIC BLOOD PRESSURE: 118 MMHG | BODY MASS INDEX: 22.46 KG/M2 | OXYGEN SATURATION: 96 % | DIASTOLIC BLOOD PRESSURE: 54 MMHG | WEIGHT: 115 LBS | RESPIRATION RATE: 16 BRPM | TEMPERATURE: 99.5 F

## 2022-01-01 VITALS
SYSTOLIC BLOOD PRESSURE: 92 MMHG | HEART RATE: 80 BPM | WEIGHT: 115.25 LBS | BODY MASS INDEX: 22.51 KG/M2 | RESPIRATION RATE: 16 BRPM | TEMPERATURE: 98.2 F | OXYGEN SATURATION: 94 % | DIASTOLIC BLOOD PRESSURE: 48 MMHG

## 2022-01-01 VITALS
RESPIRATION RATE: 16 BRPM | HEART RATE: 62 BPM | DIASTOLIC BLOOD PRESSURE: 58 MMHG | BODY MASS INDEX: 24.04 KG/M2 | OXYGEN SATURATION: 97 % | WEIGHT: 115 LBS | SYSTOLIC BLOOD PRESSURE: 124 MMHG | TEMPERATURE: 98.3 F

## 2022-01-01 VITALS
DIASTOLIC BLOOD PRESSURE: 66 MMHG | TEMPERATURE: 97.2 F | HEART RATE: 66 BPM | RESPIRATION RATE: 16 BRPM | BODY MASS INDEX: 22.14 KG/M2 | OXYGEN SATURATION: 99 % | WEIGHT: 113.38 LBS | SYSTOLIC BLOOD PRESSURE: 110 MMHG

## 2022-01-01 VITALS
TEMPERATURE: 98.6 F | BODY MASS INDEX: 22.78 KG/M2 | HEIGHT: 60 IN | WEIGHT: 116 LBS | HEART RATE: 86 BPM | DIASTOLIC BLOOD PRESSURE: 64 MMHG | SYSTOLIC BLOOD PRESSURE: 130 MMHG | RESPIRATION RATE: 18 BRPM | OXYGEN SATURATION: 97 %

## 2022-01-01 VITALS
BODY MASS INDEX: 22.65 KG/M2 | HEART RATE: 76 BPM | OXYGEN SATURATION: 95 % | SYSTOLIC BLOOD PRESSURE: 100 MMHG | RESPIRATION RATE: 18 BRPM | DIASTOLIC BLOOD PRESSURE: 60 MMHG | WEIGHT: 116 LBS | TEMPERATURE: 97.6 F

## 2022-01-01 VITALS
SYSTOLIC BLOOD PRESSURE: 100 MMHG | WEIGHT: 110.44 LBS | HEART RATE: 76 BPM | DIASTOLIC BLOOD PRESSURE: 52 MMHG | BODY MASS INDEX: 21.57 KG/M2 | RESPIRATION RATE: 18 BRPM | OXYGEN SATURATION: 98 % | TEMPERATURE: 98 F

## 2022-01-01 VITALS
SYSTOLIC BLOOD PRESSURE: 100 MMHG | RESPIRATION RATE: 16 BRPM | DIASTOLIC BLOOD PRESSURE: 56 MMHG | OXYGEN SATURATION: 99 % | HEART RATE: 83 BPM | TEMPERATURE: 97.5 F

## 2022-01-01 VITALS
RESPIRATION RATE: 18 BRPM | SYSTOLIC BLOOD PRESSURE: 94 MMHG | TEMPERATURE: 98.2 F | OXYGEN SATURATION: 94 % | HEART RATE: 80 BPM | WEIGHT: 115.25 LBS | DIASTOLIC BLOOD PRESSURE: 56 MMHG | BODY MASS INDEX: 22.51 KG/M2

## 2022-01-01 VITALS
OXYGEN SATURATION: 97 % | WEIGHT: 111.38 LBS | RESPIRATION RATE: 18 BRPM | HEART RATE: 77 BPM | DIASTOLIC BLOOD PRESSURE: 60 MMHG | SYSTOLIC BLOOD PRESSURE: 108 MMHG | BODY MASS INDEX: 21.75 KG/M2 | TEMPERATURE: 98 F

## 2022-01-01 VITALS
BODY MASS INDEX: 22.56 KG/M2 | OXYGEN SATURATION: 91 % | WEIGHT: 115.5 LBS | SYSTOLIC BLOOD PRESSURE: 108 MMHG | OXYGEN SATURATION: 97 % | SYSTOLIC BLOOD PRESSURE: 94 MMHG | HEART RATE: 95 BPM | HEART RATE: 89 BPM | DIASTOLIC BLOOD PRESSURE: 58 MMHG | TEMPERATURE: 97.6 F | RESPIRATION RATE: 16 BRPM | TEMPERATURE: 97.4 F | DIASTOLIC BLOOD PRESSURE: 60 MMHG | RESPIRATION RATE: 16 BRPM

## 2022-01-01 VITALS
SYSTOLIC BLOOD PRESSURE: 108 MMHG | WEIGHT: 117.25 LBS | BODY MASS INDEX: 22.9 KG/M2 | HEART RATE: 95 BPM | RESPIRATION RATE: 16 BRPM | TEMPERATURE: 97.4 F | DIASTOLIC BLOOD PRESSURE: 60 MMHG | OXYGEN SATURATION: 97 %

## 2022-01-01 VITALS
OXYGEN SATURATION: 95 % | WEIGHT: 110 LBS | SYSTOLIC BLOOD PRESSURE: 120 MMHG | RESPIRATION RATE: 16 BRPM | BODY MASS INDEX: 21.48 KG/M2 | HEART RATE: 68 BPM | DIASTOLIC BLOOD PRESSURE: 60 MMHG | TEMPERATURE: 97.6 F

## 2022-01-01 VITALS
OXYGEN SATURATION: 96 % | SYSTOLIC BLOOD PRESSURE: 122 MMHG | HEART RATE: 60 BPM | TEMPERATURE: 98.4 F | DIASTOLIC BLOOD PRESSURE: 58 MMHG | RESPIRATION RATE: 16 BRPM | BODY MASS INDEX: 21.56 KG/M2 | WEIGHT: 110.38 LBS

## 2022-01-01 VITALS
TEMPERATURE: 97.8 F | HEART RATE: 87 BPM | RESPIRATION RATE: 18 BRPM | BODY MASS INDEX: 21.87 KG/M2 | DIASTOLIC BLOOD PRESSURE: 60 MMHG | SYSTOLIC BLOOD PRESSURE: 104 MMHG | OXYGEN SATURATION: 94 % | WEIGHT: 112 LBS

## 2022-01-01 VITALS — WEIGHT: 110 LBS | BODY MASS INDEX: 21.48 KG/M2

## 2022-01-01 VITALS
RESPIRATION RATE: 16 BRPM | BODY MASS INDEX: 22.46 KG/M2 | WEIGHT: 115 LBS | SYSTOLIC BLOOD PRESSURE: 116 MMHG | HEART RATE: 76 BPM | TEMPERATURE: 97.9 F | OXYGEN SATURATION: 95 % | DIASTOLIC BLOOD PRESSURE: 60 MMHG

## 2022-01-01 VITALS
HEART RATE: 67 BPM | TEMPERATURE: 97.4 F | DIASTOLIC BLOOD PRESSURE: 62 MMHG | WEIGHT: 120 LBS | OXYGEN SATURATION: 97 % | SYSTOLIC BLOOD PRESSURE: 110 MMHG | RESPIRATION RATE: 16 BRPM | BODY MASS INDEX: 23.44 KG/M2

## 2022-01-01 VITALS
OXYGEN SATURATION: 99 % | DIASTOLIC BLOOD PRESSURE: 52 MMHG | RESPIRATION RATE: 16 BRPM | BODY MASS INDEX: 21.04 KG/M2 | WEIGHT: 107.14 LBS | HEIGHT: 60 IN | SYSTOLIC BLOOD PRESSURE: 100 MMHG | HEART RATE: 80 BPM

## 2022-01-01 VITALS
RESPIRATION RATE: 14 BRPM | BODY MASS INDEX: 21.01 KG/M2 | SYSTOLIC BLOOD PRESSURE: 124 MMHG | DIASTOLIC BLOOD PRESSURE: 72 MMHG | HEIGHT: 60 IN | HEART RATE: 84 BPM | TEMPERATURE: 98.8 F | OXYGEN SATURATION: 98 % | WEIGHT: 107 LBS

## 2022-01-01 VITALS — SYSTOLIC BLOOD PRESSURE: 144 MMHG | DIASTOLIC BLOOD PRESSURE: 84 MMHG | HEART RATE: 94 BPM

## 2022-01-01 VITALS
TEMPERATURE: 97.9 F | OXYGEN SATURATION: 98 % | HEART RATE: 88 BPM | DIASTOLIC BLOOD PRESSURE: 80 MMHG | SYSTOLIC BLOOD PRESSURE: 128 MMHG | RESPIRATION RATE: 16 BRPM

## 2022-01-01 VITALS
SYSTOLIC BLOOD PRESSURE: 108 MMHG | RESPIRATION RATE: 16 BRPM | OXYGEN SATURATION: 98 % | HEART RATE: 90 BPM | DIASTOLIC BLOOD PRESSURE: 60 MMHG | TEMPERATURE: 98.3 F

## 2022-01-01 VITALS
RESPIRATION RATE: 16 BRPM | BODY MASS INDEX: 23.51 KG/M2 | TEMPERATURE: 97.1 F | HEART RATE: 62 BPM | WEIGHT: 120.4 LBS | SYSTOLIC BLOOD PRESSURE: 102 MMHG | DIASTOLIC BLOOD PRESSURE: 52 MMHG | OXYGEN SATURATION: 98 %

## 2022-01-01 VITALS
DIASTOLIC BLOOD PRESSURE: 60 MMHG | BODY MASS INDEX: 22.07 KG/M2 | OXYGEN SATURATION: 95 % | WEIGHT: 113 LBS | RESPIRATION RATE: 16 BRPM | SYSTOLIC BLOOD PRESSURE: 116 MMHG | TEMPERATURE: 97.6 F | HEART RATE: 74 BPM

## 2022-01-01 VITALS
HEART RATE: 61 BPM | DIASTOLIC BLOOD PRESSURE: 60 MMHG | RESPIRATION RATE: 16 BRPM | OXYGEN SATURATION: 100 % | WEIGHT: 114.4 LBS | TEMPERATURE: 98.3 F | SYSTOLIC BLOOD PRESSURE: 110 MMHG | BODY MASS INDEX: 22.34 KG/M2

## 2022-01-01 DIAGNOSIS — M54.31 SCIATICA OF RIGHT SIDE: ICD-10-CM

## 2022-01-01 DIAGNOSIS — Z99.81 DEPENDENCE ON SUPPLEMENTAL OXYGEN: ICD-10-CM

## 2022-01-01 DIAGNOSIS — Z79.4 TYPE 2 DIABETES MELLITUS WITH HYPERGLYCEMIA, WITH LONG-TERM CURRENT USE OF INSULIN (HCC): ICD-10-CM

## 2022-01-01 DIAGNOSIS — E11.65 TYPE 2 DIABETES MELLITUS WITH HYPERGLYCEMIA, WITH LONG-TERM CURRENT USE OF INSULIN (HCC): ICD-10-CM

## 2022-01-01 DIAGNOSIS — I50.20 HEART FAILURE WITH REDUCED EJECTION FRACTION, NYHA CLASS III (HCC): ICD-10-CM

## 2022-01-01 DIAGNOSIS — E04.2 MULTINODULAR GOITER: ICD-10-CM

## 2022-01-01 DIAGNOSIS — E11.9 DIABETES MELLITUS WITH COINCIDENT HYPERTENSION (HCC): ICD-10-CM

## 2022-01-01 DIAGNOSIS — E78.2 MIXED HYPERLIPIDEMIA: ICD-10-CM

## 2022-01-01 DIAGNOSIS — I10 ESSENTIAL HYPERTENSION: ICD-10-CM

## 2022-01-01 DIAGNOSIS — I45.2 RIGHT BUNDLE BRANCH BLOCK (RBBB) WITH LEFT ANTERIOR FASCICULAR BLOCK (LAFB): ICD-10-CM

## 2022-01-01 DIAGNOSIS — Z85.3 HISTORY OF BREAST CANCER: ICD-10-CM

## 2022-01-01 DIAGNOSIS — G89.29 CHRONIC RIGHT-SIDED LOW BACK PAIN WITH RIGHT-SIDED SCIATICA: ICD-10-CM

## 2022-01-01 DIAGNOSIS — I45.10 RIGHT BUNDLE BRANCH BLOCK (RBBB): ICD-10-CM

## 2022-01-01 DIAGNOSIS — I27.20 PULMONARY HYPERTENSION (HCC): ICD-10-CM

## 2022-01-01 DIAGNOSIS — M85.89 OSTEOPENIA OF MULTIPLE SITES: ICD-10-CM

## 2022-01-01 DIAGNOSIS — M62.830 BACK MUSCLE SPASM: ICD-10-CM

## 2022-01-01 DIAGNOSIS — Z71.89 ENCOUNTER FOR HOSPICE CARE DISCUSSION: ICD-10-CM

## 2022-01-01 DIAGNOSIS — R55 SYNCOPE AND COLLAPSE: ICD-10-CM

## 2022-01-01 DIAGNOSIS — G89.4 CHRONIC PAIN DISORDER: ICD-10-CM
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
## 2022-01-01 DIAGNOSIS — I50.22 CHRONIC SYSTOLIC HEART FAILURE (HCC): ICD-10-CM

## 2022-01-01 DIAGNOSIS — I50.22 CHRONIC HFREF (HEART FAILURE WITH REDUCED EJECTION FRACTION) (HCC): ICD-10-CM

## 2022-01-01 DIAGNOSIS — R53.81 DEBILITY: ICD-10-CM

## 2022-01-01 DIAGNOSIS — I25.10 CORONARY ARTERY DISEASE INVOLVING NATIVE CORONARY ARTERY OF NATIVE HEART WITHOUT ANGINA PECTORIS: ICD-10-CM

## 2022-01-01 DIAGNOSIS — M54.41 CHRONIC RIGHT-SIDED LOW BACK PAIN WITH RIGHT-SIDED SCIATICA: ICD-10-CM

## 2022-01-01 DIAGNOSIS — I10 DIABETES MELLITUS WITH COINCIDENT HYPERTENSION (HCC): ICD-10-CM

## 2022-01-01 DIAGNOSIS — I10 HYPERTENSION, UNSPECIFIED TYPE: ICD-10-CM

## 2022-01-01 DIAGNOSIS — E11.65 UNCONTROLLED TYPE 2 DIABETES MELLITUS WITH HYPERGLYCEMIA (HCC): ICD-10-CM

## 2022-01-01 DIAGNOSIS — Z95.1 HX OF CABG: ICD-10-CM

## 2022-01-01 DIAGNOSIS — I51.7 RVH (RIGHT VENTRICULAR HYPERTROPHY): ICD-10-CM

## 2022-01-01 DIAGNOSIS — I25.5 ISCHEMIC CARDIOMYOPATHY: ICD-10-CM

## 2022-01-01 DIAGNOSIS — R42 DIZZINESS: ICD-10-CM

## 2022-01-01 DIAGNOSIS — Z23 NEED FOR VACCINATION: ICD-10-CM

## 2022-01-01 DIAGNOSIS — I25.10 CORONARY ARTERY DISEASE INVOLVING NATIVE HEART, UNSPECIFIED VESSEL OR LESION TYPE, UNSPECIFIED WHETHER ANGINA PRESENT: ICD-10-CM

## 2022-01-01 DIAGNOSIS — R00.1 BRADYCARDIA: ICD-10-CM

## 2022-01-01 DIAGNOSIS — N18.32 CHRONIC KIDNEY DISEASE (CKD) STAGE G3B/A3, MODERATELY DECREASED GLOMERULAR FILTRATION RATE (GFR) BETWEEN 30-44 ML/MIN/1.73 SQUARE METER AND ALBUMINURIA CREATININE RATIO GREATER THAN 300 MG/G: ICD-10-CM

## 2022-01-01 DIAGNOSIS — R29.6 FALLS FREQUENTLY: ICD-10-CM

## 2022-01-01 DIAGNOSIS — I42.0 ISCHEMIC DILATED CARDIOMYOPATHY (HCC): ICD-10-CM

## 2022-01-01 DIAGNOSIS — G47.33 OBSTRUCTIVE SLEEP APNEA: ICD-10-CM

## 2022-01-01 DIAGNOSIS — I35.0 NONRHEUMATIC AORTIC VALVE STENOSIS: ICD-10-CM

## 2022-01-01 DIAGNOSIS — I51.7 LVH (LEFT VENTRICULAR HYPERTROPHY): ICD-10-CM

## 2022-01-01 DIAGNOSIS — I25.5 ISCHEMIC DILATED CARDIOMYOPATHY (HCC): ICD-10-CM

## 2022-01-01 DIAGNOSIS — R41.3 MEMORY CHANGES: ICD-10-CM

## 2022-01-01 DIAGNOSIS — I34.0 NONRHEUMATIC MITRAL VALVE REGURGITATION: ICD-10-CM

## 2022-01-01 DIAGNOSIS — M25.511 ACUTE PAIN OF RIGHT SHOULDER: ICD-10-CM

## 2022-01-01 DIAGNOSIS — R41.3 MEMORY CHANGE: ICD-10-CM

## 2022-01-01 DIAGNOSIS — R41.0 CONFUSION: ICD-10-CM

## 2022-01-01 DIAGNOSIS — E11.9 TYPE 2 DIABETES MELLITUS WITHOUT COMPLICATION, WITHOUT LONG-TERM CURRENT USE OF INSULIN (HCC): ICD-10-CM

## 2022-01-01 DIAGNOSIS — R53.1 WEAKNESS: ICD-10-CM

## 2022-01-01 DIAGNOSIS — R29.6 RECURRENT FALLS: ICD-10-CM

## 2022-01-01 DIAGNOSIS — S22.31XD CLOSED FRACTURE OF ONE RIB OF RIGHT SIDE WITH ROUTINE HEALING, SUBSEQUENT ENCOUNTER: ICD-10-CM

## 2022-01-01 LAB
EKG IMPRESSION: NORMAL
GLUCOSE BLD-MCNC: 158 MG/DL (ref 70–100)
GLUCOSE BLD-MCNC: 175 MG/DL (ref 70–100)
GLUCOSE BLD-MCNC: 176 MG/DL (ref 70–100)
GLUCOSE BLD-MCNC: 97 MG/DL (ref 70–100)
HBA1C MFR BLD: 6.7 % (ref 0–5.6)
HBA1C MFR BLD: 7 % (ref 0–5.6)
HBA1C MFR BLD: 7.4 % (ref 0–5.6)
INT CON NEG: ABNORMAL
INT CON POS: ABNORMAL

## 2022-01-01 PROCEDURE — G0151 HHCP-SERV OF PT,EA 15 MIN: HCPCS

## 2022-01-01 PROCEDURE — G0299 HHS/HOSPICE OF RN EA 15 MIN: HCPCS

## 2022-01-01 PROCEDURE — 83036 HEMOGLOBIN GLYCOSYLATED A1C: CPT | Performed by: STUDENT IN AN ORGANIZED HEALTH CARE EDUCATION/TRAINING PROGRAM

## 2022-01-01 PROCEDURE — 90662 IIV NO PRSV INCREASED AG IM: CPT | Performed by: NURSE PRACTITIONER

## 2022-01-01 PROCEDURE — G0152 HHCP-SERV OF OT,EA 15 MIN: HCPCS

## 2022-01-01 PROCEDURE — RXMED WILLOW AMBULATORY MEDICATION CHARGE: Performed by: NURSE PRACTITIONER

## 2022-01-01 PROCEDURE — G0493 RN CARE EA 15 MIN HH/HOSPICE: HCPCS

## 2022-01-01 PROCEDURE — 99211 OFF/OP EST MAY X REQ PHY/QHP: CPT | Performed by: STUDENT IN AN ORGANIZED HEALTH CARE EDUCATION/TRAINING PROGRAM

## 2022-01-01 PROCEDURE — G0155 HHCP-SVS OF CSW,EA 15 MIN: HCPCS

## 2022-01-01 PROCEDURE — G0153 HHCP-SVS OF S/L PATH,EA 15MN: HCPCS

## 2022-01-01 PROCEDURE — 665001 SOC-HOME HEALTH

## 2022-01-01 PROCEDURE — 99999 POCT GLUCOSE: CPT | Performed by: STUDENT IN AN ORGANIZED HEALTH CARE EDUCATION/TRAINING PROGRAM

## 2022-01-01 PROCEDURE — G0156 HHCP-SVS OF AIDE,EA 15 MIN: HCPCS

## 2022-01-01 PROCEDURE — S9126 HOSPICE CARE, IN THE HOME, P: HCPCS

## 2022-01-01 PROCEDURE — G0179 MD RECERTIFICATION HHA PT: HCPCS | Performed by: NURSE PRACTITIONER

## 2022-01-01 PROCEDURE — G0180 MD CERTIFICATION HHA PATIENT: HCPCS | Performed by: NURSE PRACTITIONER

## 2022-01-01 PROCEDURE — 83036 HEMOGLOBIN GLYCOSYLATED A1C: CPT | Performed by: NURSE PRACTITIONER

## 2022-01-01 PROCEDURE — 82962 GLUCOSE BLOOD TEST: CPT

## 2022-01-01 PROCEDURE — 99214 OFFICE O/P EST MOD 30 MIN: CPT | Performed by: NURSE PRACTITIONER

## 2022-01-01 PROCEDURE — 99214 OFFICE O/P EST MOD 30 MIN: CPT | Performed by: INTERNAL MEDICINE

## 2022-01-01 PROCEDURE — 665003 FOLLOW UP-HOME HEALTH

## 2022-01-01 PROCEDURE — 82962 GLUCOSE BLOOD TEST: CPT | Performed by: STUDENT IN AN ORGANIZED HEALTH CARE EDUCATION/TRAINING PROGRAM

## 2022-01-01 PROCEDURE — 93000 ELECTROCARDIOGRAM COMPLETE: CPT | Performed by: INTERNAL MEDICINE

## 2022-01-01 PROCEDURE — 99204 OFFICE O/P NEW MOD 45 MIN: CPT | Mod: 25 | Performed by: INTERNAL MEDICINE

## 2022-01-01 PROCEDURE — 99999 POCT A1C: CPT | Performed by: STUDENT IN AN ORGANIZED HEALTH CARE EDUCATION/TRAINING PROGRAM

## 2022-01-01 PROCEDURE — 99999 PR NO CHARGE: CPT | Performed by: STUDENT IN AN ORGANIZED HEALTH CARE EDUCATION/TRAINING PROGRAM

## 2022-01-01 PROCEDURE — 665036 HSPC NOTICE OF ELECTION NOE

## 2022-01-01 PROCEDURE — 99214 OFFICE O/P EST MOD 30 MIN: CPT | Mod: 25 | Performed by: NURSE PRACTITIONER

## 2022-01-01 PROCEDURE — G0008 ADMIN INFLUENZA VIRUS VAC: HCPCS | Performed by: NURSE PRACTITIONER

## 2022-01-01 RX ORDER — AMOXICILLIN 250 MG
CAPSULE ORAL
Qty: 10 TABLET | Refills: 6 | OUTPATIENT
Start: 2022-01-01

## 2022-01-01 RX ORDER — INSULIN GLARGINE 100 [IU]/ML
15 INJECTION, SOLUTION SUBCUTANEOUS DAILY
Qty: 3 ML | Refills: 3 | Status: SHIPPED | OUTPATIENT
Start: 2022-01-01 | End: 2022-01-01

## 2022-01-01 RX ORDER — LISINOPRIL 10 MG/1
10 TABLET ORAL DAILY
Qty: 100 TABLET | Refills: 7 | Status: SHIPPED | OUTPATIENT
Start: 2022-01-01 | End: 2022-01-01

## 2022-01-01 RX ORDER — FUROSEMIDE 20 MG/1
20 TABLET ORAL
Qty: 60 TABLET | Refills: 0 | Status: SHIPPED | OUTPATIENT
Start: 2022-01-01 | End: 2022-01-01 | Stop reason: SDUPTHER

## 2022-01-01 RX ORDER — CYCLOBENZAPRINE HCL 5 MG
5 TABLET ORAL
Qty: 30 TABLET | Refills: 1 | Status: SHIPPED | OUTPATIENT
Start: 2022-01-01 | End: 2022-01-01

## 2022-01-01 RX ORDER — DULAGLUTIDE 4.5 MG/.5ML
1 INJECTION, SOLUTION SUBCUTANEOUS
Qty: 8 ML | Refills: 4 | Status: SHIPPED
Start: 2022-01-01 | End: 2022-01-01

## 2022-01-01 RX ORDER — HYDROCODONE BITARTRATE AND ACETAMINOPHEN 5; 325 MG/1; MG/1
1 TABLET ORAL EVERY 6 HOURS
Qty: 112 TABLET | Refills: 0 | Status: SHIPPED | OUTPATIENT
Start: 2022-01-01 | End: 2022-01-01

## 2022-01-01 RX ORDER — EMPAGLIFLOZIN 25 MG/1
1 TABLET, FILM COATED ORAL DAILY
COMMUNITY
End: 2022-01-01

## 2022-01-01 RX ORDER — INSULIN GLARGINE 100 [IU]/ML
INJECTION, SOLUTION SUBCUTANEOUS
Qty: 15 ML | Refills: 2 | Status: SHIPPED | OUTPATIENT
Start: 2022-01-01 | End: 2022-01-01

## 2022-01-01 RX ORDER — DULAGLUTIDE 3 MG/.5ML
1 INJECTION, SOLUTION SUBCUTANEOUS
Qty: 8 ML | Refills: 3 | Status: SHIPPED
Start: 2022-01-01 | End: 2022-01-01 | Stop reason: DRUGHIGH

## 2022-01-01 RX ORDER — MECLIZINE HCL 12.5 MG/1
12.5 TABLET ORAL
Qty: 90 TABLET | Refills: 0 | Status: SHIPPED | OUTPATIENT
Start: 2022-01-01 | End: 2022-01-01

## 2022-01-01 RX ORDER — HYDROCODONE BITARTRATE AND ACETAMINOPHEN 5; 325 MG/1; MG/1
1 TABLET ORAL
COMMUNITY
Start: 2022-01-01 | End: 2022-01-01 | Stop reason: SDUPTHER

## 2022-01-01 RX ORDER — LISINOPRIL 5 MG/1
5 TABLET ORAL DAILY
Qty: 90 TABLET | Refills: 0 | Status: SHIPPED | OUTPATIENT
Start: 2022-01-01 | End: 2022-01-01

## 2022-01-01 RX ORDER — LANCETS 28 GAUGE
EACH MISCELLANEOUS
COMMUNITY
End: 2022-01-01

## 2022-01-01 RX ORDER — ONDANSETRON 4 MG/1
TABLET, ORALLY DISINTEGRATING ORAL
Qty: 10 TABLET | Refills: 6 | OUTPATIENT
Start: 2022-01-01

## 2022-01-01 RX ORDER — DULAGLUTIDE 3 MG/.5ML
1 INJECTION, SOLUTION SUBCUTANEOUS
Qty: 2 ML | Refills: 0 | Status: SHIPPED | OUTPATIENT
Start: 2022-01-01 | End: 2022-01-01 | Stop reason: SDUPTHER

## 2022-01-01 RX ORDER — BISACODYL 10 MG
SUPPOSITORY, RECTAL RECTAL
Qty: 5 SUPPOSITORY | Refills: 6 | OUTPATIENT
Start: 2022-01-01

## 2022-01-01 RX ORDER — LISINOPRIL 10 MG/1
10 TABLET ORAL DAILY
Qty: 90 TABLET | Refills: 7 | Status: SHIPPED | OUTPATIENT
Start: 2022-01-01 | End: 2022-01-01 | Stop reason: SDUPTHER

## 2022-01-01 RX ORDER — MORPHINE SULFATE 100 MG/5ML
5 SOLUTION ORAL
Qty: 120 ML | Refills: 0 | Status: SHIPPED | OUTPATIENT
Start: 2022-01-01 | End: 2023-11-15

## 2022-01-01 RX ORDER — POTASSIUM CHLORIDE 750 MG/1
10 TABLET, EXTENDED RELEASE ORAL
Qty: 90 TABLET | Refills: 0 | Status: SHIPPED | OUTPATIENT
Start: 2022-01-01 | End: 2022-01-01

## 2022-01-01 RX ORDER — METOPROLOL SUCCINATE 25 MG/1
25 TABLET, EXTENDED RELEASE ORAL DAILY
Qty: 90 TABLET | Refills: 7 | Status: SHIPPED | OUTPATIENT
Start: 2022-01-01 | End: 2022-01-01

## 2022-01-01 RX ORDER — INSULIN GLARGINE 100 [IU]/ML
INJECTION, SOLUTION SUBCUTANEOUS
Qty: 3 ML | Refills: 2 | Status: SHIPPED | OUTPATIENT
Start: 2022-01-01 | End: 2022-01-01 | Stop reason: SDUPTHER

## 2022-01-01 RX ORDER — INSULIN GLARGINE 100 [IU]/ML
15 INJECTION, SOLUTION SUBCUTANEOUS DAILY
COMMUNITY
End: 2022-01-01 | Stop reason: SDUPTHER

## 2022-01-01 RX ORDER — ACETAMINOPHEN 500 MG
TABLET ORAL
Qty: 10 TABLET | Refills: 6 | Status: SHIPPED | OUTPATIENT
Start: 2022-01-01 | End: 2022-01-01

## 2022-01-01 RX ORDER — CYCLOBENZAPRINE HCL 5 MG
5 TABLET ORAL
Qty: 15 TABLET | Refills: 0 | Status: SHIPPED | OUTPATIENT
Start: 2022-01-01 | End: 2022-01-01

## 2022-01-01 RX ORDER — ONDANSETRON 4 MG/1
4 TABLET, FILM COATED ORAL
COMMUNITY
End: 2022-01-01

## 2022-01-01 RX ORDER — DULAGLUTIDE 0.75 MG/.5ML
1 INJECTION, SOLUTION SUBCUTANEOUS
COMMUNITY
End: 2022-01-01

## 2022-01-01 RX ORDER — PREGABALIN 50 MG/1
50 CAPSULE ORAL DAILY
COMMUNITY
Start: 2022-01-01 | End: 2022-01-01

## 2022-01-01 RX ORDER — ALBUTEROL SULFATE 90 UG/1
2 AEROSOL, METERED RESPIRATORY (INHALATION) EVERY 6 HOURS PRN
COMMUNITY
End: 2022-01-01

## 2022-01-01 RX ORDER — HYDROCODONE BITARTRATE AND ACETAMINOPHEN 5; 300 MG/1; MG/1
1 TABLET ORAL PRN
COMMUNITY
End: 2022-01-01

## 2022-01-01 RX ORDER — LIDOCAINE 50 MG/G
PATCH TOPICAL
COMMUNITY
Start: 2021-01-01 | End: 2022-01-01 | Stop reason: ALTCHOICE

## 2022-01-01 RX ORDER — RANOLAZINE 500 MG/1
1 TABLET, EXTENDED RELEASE ORAL 2 TIMES DAILY
COMMUNITY
Start: 2022-01-01 | End: 2022-01-01 | Stop reason: ALTCHOICE

## 2022-01-01 RX ORDER — PREGABALIN 75 MG/1
CAPSULE ORAL
COMMUNITY
Start: 2022-01-01 | End: 2022-01-01 | Stop reason: ALTCHOICE

## 2022-01-01 RX ORDER — LORAZEPAM 2 MG/ML
0.5 CONCENTRATE ORAL EVERY 4 HOURS PRN
Qty: 120 ML | Refills: 0 | Status: SHIPPED | OUTPATIENT
Start: 2022-01-01 | End: 2023-11-15

## 2022-01-01 RX ORDER — INSULIN GLARGINE 100 [IU]/ML
15 INJECTION, SOLUTION SUBCUTANEOUS DAILY
Qty: 15 ML | Refills: 0 | Status: SHIPPED | OUTPATIENT
Start: 2022-01-01 | End: 2022-01-01

## 2022-01-01 RX ORDER — FUROSEMIDE 20 MG/1
TABLET ORAL
Qty: 90 TABLET | Refills: 1 | OUTPATIENT
Start: 2022-01-01

## 2022-01-01 RX ORDER — CYCLOBENZAPRINE HCL 5 MG
5 TABLET ORAL
Qty: 30 TABLET | Refills: 0 | OUTPATIENT
Start: 2022-01-01

## 2022-01-01 RX ORDER — DULAGLUTIDE 1.5 MG/.5ML
1 INJECTION, SOLUTION SUBCUTANEOUS
COMMUNITY
End: 2022-01-01 | Stop reason: DRUGHIGH

## 2022-01-01 RX ORDER — KETOROLAC TROMETHAMINE 30 MG/ML
30 INJECTION, SOLUTION INTRAMUSCULAR; INTRAVENOUS ONCE
Status: COMPLETED | OUTPATIENT
Start: 2022-01-01 | End: 2022-01-01

## 2022-01-01 RX ORDER — FUROSEMIDE 20 MG/1
20 TABLET ORAL
Qty: 60 TABLET | Refills: 1 | Status: SHIPPED | OUTPATIENT
Start: 2022-01-01

## 2022-01-01 RX ADMIN — KETOROLAC TROMETHAMINE 30 MG: 30 INJECTION, SOLUTION INTRAMUSCULAR; INTRAVENOUS at 15:13

## 2022-01-01 SDOH — ECONOMIC STABILITY: HOUSING INSECURITY: EVIDENCE OF SMOKING MATERIAL: 0

## 2022-01-01 SDOH — ECONOMIC STABILITY: HOUSING INSECURITY
HOME SAFETY: OXYGEN SAFETY RISK ASSESSMENT PERFORMED. PATIENT DOES HAVE A NO SMOKING SIGN POSTED IN THE HOME. PATIENT DOES HAVE A WORKING FIRE EXTINGUISHER PRESENT IN THE HOME. SMOKE ALARMS ARE PRESENT AND FUNCTIONAL ON EACH LEVEL OF THE HOME. PATIENT DOES HAVE A

## 2022-01-01 SDOH — ECONOMIC STABILITY: HOUSING INSECURITY
HOME SAFETY: FIRE ESCAPE PLAN DEVELOPED. PATIENT DOES NOT HAVE FLAMMABLE MATERIALS PRESENT IN THE HOME PRESENTING A FIRE HAZARD. NO EVIDENCE FOUND OF SMOKING MATERIALS PRESENT IN THE HOME.

## 2022-01-01 ASSESSMENT — ENCOUNTER SYMPTOMS
NAUSEA: DENIES
PAIN: 1
WHEEZING: 0
PAIN LOCATION - PAIN QUALITY: ACHING
ASSOCIATED SYMPTOMS: DENIES
PAIN LOCATION - PAIN SEVERITY: 2/10
LOWEST PAIN SEVERITY IN PAST 24 HOURS: 0/10
PAIN LOCATION - PAIN DURATION: OCCASSIONALLY
PAIN LOCATION - PAIN FREQUENCY: INFREQUENT
PAIN SEVERITY GOAL: 0/10
SHORTNESS OF BREATH: 1
DENIES PAIN: 1
VOMITING: DENIES
PAIN: 1
MUSCLE WEAKNESS: 1
PAIN: 1
PAIN LOCATION: LOWER LEGS
VOMITING: DENIES
PAIN LOCATION - PAIN QUALITY: ACHING
PAIN LOCATION - EXACERBATING FACTORS: AMBULATION
LOWEST PAIN SEVERITY IN PAST 24 HOURS: 0/10
FATIGUE: 1
PAIN: 1
DIFFICULTY THINKING: 1
VOMITING: DENIES
PERSON REPORTING PAIN: PATIENT
COUGH CHARACTERISTICS: NON-PRODUCTIVE
PAIN LOCATION - PAIN FREQUENCY: INTERMITTENT
CONSTIPATION: 0
SUBJECTIVE PAIN PROGRESSION: GRADUALLY IMPROVING
MUSCLE WEAKNESS: 1
PERSON REPORTING PAIN: PATIENT
PAIN LOCATION - RELIEVING FACTORS: PAIN MEDS, REST, REPOSITIONING
PAIN LOCATION - PAIN FREQUENCY: INTERMITTENT
DYSPNEA ACTIVITY LEVEL: AFTER AMBULATING LESS THAN 10 FT
ABDOMINAL PAIN: 0
DIFFICULTY THINKING: 1
DENIES PAIN: 1
VOMITING: DENIES
DIFFICULTY THINKING: 1
PAIN SEVERITY GOAL: 0/10
DIFFICULTY THINKING: 1
PAIN: 1
DENIES PAIN: 1
MUSCLE WEAKNESS: 1
PAIN LOCATION - PAIN QUALITY: ACHY
LOWEST PAIN SEVERITY IN PAST 24 HOURS: 2/10
PAIN: 1
SHORTNESS OF BREATH: 1
LOWEST PAIN SEVERITY IN PAST 24 HOURS: 0/10
LOWEST PAIN SEVERITY IN PAST 24 HOURS: 0/10
PAIN LOCATION - PAIN DURATION: 30 MINUTES
MUSCLE WEAKNESS: 1
PAIN SEVERITY GOAL: 0/10
HIGHEST PAIN SEVERITY IN PAST 24 HOURS: 4/10
FATIGUE: 1
PSYCHIATRIC NEGATIVE: 1
SUBJECTIVE PAIN PROGRESSION: WAXING AND WANING
PAIN LOCATION - PAIN SEVERITY: 3/10
HIGHEST PAIN SEVERITY IN PAST 24 HOURS: 0/10
MUSCLE WEAKNESS: 1
PAIN LOCATION - RELIEVING FACTORS: REST, MEDS
COUGH: 1
PAIN LOCATION - EXACERBATING FACTORS: POSITION
PAIN SEVERITY GOAL: 0/10
PERSON REPORTING PAIN: PATIENT
PAIN LOCATION - RELIEVING FACTORS: PAIN MEDICATION
DENIES PAIN: 1
FATIGUE: 1
POOR JUDGMENT: 1
SHORTNESS OF BREATH: 1
DENIES PAIN: 1
DENIES PAIN: 1
PERSON REPORTING PAIN: PATIENT
PAIN: 1
NAUSEA: DENIES
DIFFICULTY THINKING: 1
PAIN LOCATION - PAIN SEVERITY: 5/10
VOMITING: DENIES
DENIES PAIN: 1
POOR JUDGMENT: 1
PAIN: 1
PAIN: 1
PAIN SEVERITY GOAL: 0/10
DYSPNEA ACTIVITY LEVEL: AT REST
DENIES PAIN: 1
DIFFICULTY THINKING: 1
PAIN LOCATION - PAIN FREQUENCY: INTERMITTENT
PAIN: 1
NAUSEA: DENIES
HIGHEST PAIN SEVERITY IN PAST 24 HOURS: 0/10
VOMITING: DENIES
LOWEST PAIN SEVERITY IN PAST 24 HOURS: 0/10
DYSPNEA ON EXERTION: 1
MUSCLE WEAKNESS: 1
PAIN: 1
HIGHEST PAIN SEVERITY IN PAST 24 HOURS: 4/10
PERSON REPORTING PAIN: PATIENT
PAIN LOCATION: COCCYX
PAIN SEVERITY GOAL: 0/10
DENIES PAIN: 1
BOWEL PATTERN NORMAL: 1
LOWEST PAIN SEVERITY IN PAST 24 HOURS: 0/10
PERSON REPORTING PAIN: PATIENT
PERSON REPORTING PAIN: PATIENT
PAIN LOCATION - PAIN QUALITY: ACHING
ASSOCIATED SYMPTOMS: DENIES
LOWEST PAIN SEVERITY IN PAST 24 HOURS: 0/10
PAIN SEVERITY GOAL: 0/10
POOR JUDGMENT: 1
POOR JUDGMENT: 1
HIGHEST PAIN SEVERITY IN PAST 24 HOURS: 3/10
PAIN SEVERITY GOAL: 0/10
ASSOCIATED SYMPTOMS: DENIES
AGGRESSION WITHIN DEFINED LIMITS: 1
PAIN LOCATION - PAIN DURATION: INTERMITTENT
LOWEST PAIN SEVERITY IN PAST 24 HOURS: 0/10
MUSCLE WEAKNESS: 1
HIGHEST PAIN SEVERITY IN PAST 24 HOURS: 0/10
PAIN SEVERITY GOAL: 0/10
DIARRHEA: 0
VOMITING: DENIES
PAIN LOCATION - PAIN SEVERITY: 8/10
PERSON REPORTING PAIN: PATIENT
DIFFICULTY THINKING: 1
HIGHEST PAIN SEVERITY IN PAST 24 HOURS: 5/10
PAIN: 1
BOWEL PATTERN NORMAL: 1
NAUSEA: DENIES
PAIN LOCATION: BACK
PAIN: 1
ASSOCIATED SYMPTOMS: DENIES
SUBJECTIVE PAIN PROGRESSION: UNCHANGED
PERSON REPORTING PAIN: PATIENT
LOWEST PAIN SEVERITY IN PAST 24 HOURS: 0/10
HIGHEST PAIN SEVERITY IN PAST 24 HOURS: 3/10
VOMITING: DENIES
HIGHEST PAIN SEVERITY IN PAST 24 HOURS: 0/10
PAIN LOCATION - PAIN QUALITY: DULL, ACHY
LOWEST PAIN SEVERITY IN PAST 24 HOURS: 0/10
PAIN: 1
LOWEST PAIN SEVERITY IN PAST 24 HOURS: 0/10
FATIGUE: 1
DENIES PAIN: 1
MUSCLE WEAKNESS: 1
SUBJECTIVE PAIN PROGRESSION: WAXING AND WANING
PAIN LOCATION - PAIN QUALITY: ACHING, BURNING
DENIES PAIN: 1
PAIN LOCATION - EXACERBATING FACTORS: STANDING, WALKING
MUSCLE WEAKNESS: 1
PAIN LOCATION - RELIEVING FACTORS: PAIN MEDICATION, REST
CHILLS: 0
POOR JUDGMENT: 1
SUBJECTIVE PAIN PROGRESSION: WAXING AND WANING
DYSPNEA ON EXERTION: 1
PERSON REPORTING PAIN: PATIENT
PAIN SEVERITY GOAL: 0/10
VOMITING: DENIES
LOWEST PAIN SEVERITY IN PAST 24 HOURS: 0/10
PERSON REPORTING PAIN: PATIENT
NAUSEA: 0
SUBJECTIVE PAIN PROGRESSION: WAXING AND WANING
PERSON REPORTING PAIN: PATIENT
SUBJECTIVE PAIN PROGRESSION: UNCHANGED
PERSON REPORTING PAIN: PATIENT
LOWEST PAIN SEVERITY IN PAST 24 HOURS: 0/10
LOWEST PAIN SEVERITY IN PAST 24 HOURS: 0/10
HIGHEST PAIN SEVERITY IN PAST 24 HOURS: 0/10
SUBJECTIVE PAIN PROGRESSION: WAXING AND WANING
PAIN LOCATION - EXACERBATING FACTORS: STANDING, WALKING
PAIN LOCATION - EXACERBATING FACTORS: EXERTION
PAIN LOCATION - PAIN DURATION: FEW MINUTES
PAIN LOCATION - EXACERBATING FACTORS: STANDING, WALKING
HIGHEST PAIN SEVERITY IN PAST 24 HOURS: 3/10
NAUSEA: DENIES
HIGHEST PAIN SEVERITY IN PAST 24 HOURS: 0/10
DESCRIPTION OF MEMORY LOSS: SHORT TERM
PAIN SEVERITY GOAL: 0/10
PAIN LOCATION - PAIN FREQUENCY: INTERMITTENT
SUBJECTIVE PAIN PROGRESSION: WAXING AND WANING
PAIN LOCATION - PAIN QUALITY: ACHING
FATIGUE: 1
HIGHEST PAIN SEVERITY IN PAST 24 HOURS: 0/10
FORGETFULNESS: 1
SUBJECTIVE PAIN PROGRESSION: WAXING AND WANING
PAIN: 1
PERSON REPORTING PAIN: PATIENT
SUBJECTIVE PAIN PROGRESSION: WAXING AND WANING
PAIN LOCATION - PAIN FREQUENCY: INTERMITTENT
HIGHEST PAIN SEVERITY IN PAST 24 HOURS: 0/10
PAIN LOCATION: LOWER BACK
PAIN LOCATION - PAIN SEVERITY: 3/10
PAIN LOCATION - PAIN DURATION: 30 MINUTES
NAUSEA: DENIES
VOMITING: DENIES
FATIGUES EASILY: 1
MUSCLE WEAKNESS: 1
FATIGUE: 1
PAIN LOCATION - PAIN DURATION: 30 MINUTES
PAIN LOCATION - PAIN FREQUENCY: FREQUENT
DENIES PAIN: 1
VOMITING: DENIES
DENIES PAIN: 1
PAIN LOCATION - PAIN SEVERITY: 2/10
PAIN LOCATION - EXACERBATING FACTORS: STANDING, WALKING
PERSON REPORTING PAIN: PATIENT
PAIN LOCATION - RELIEVING FACTORS: PAIN MEDICATION
MUSCLE WEAKNESS: 1
VOMITING: DENIES
MUSCLE WEAKNESS: 1
PAIN LOCATION - PAIN DURATION: DAILY
HIGHEST PAIN SEVERITY IN PAST 24 HOURS: 0/10
PERSON REPORTING PAIN: PATIENT
PAIN LOCATION - PAIN SEVERITY: 2/10
DIFFICULTY THINKING: 1
HIGHEST PAIN SEVERITY IN PAST 24 HOURS: 0/10
NAUSEA: DENIES
LOWEST PAIN SEVERITY IN PAST 24 HOURS: 0/10
PAIN SEVERITY GOAL: 0/10
LOWEST PAIN SEVERITY IN PAST 24 HOURS: 0/10
DIFFICULTY THINKING: 1
POOR JUDGMENT: 1
PAIN LOCATION - RELIEVING FACTORS: PAIN MEDS, REST
HIGHEST PAIN SEVERITY IN PAST 24 HOURS: 3/10
DIFFICULTY THINKING: 1
SUBJECTIVE PAIN PROGRESSION: GRADUALLY IMPROVING
PAIN LOCATION: RIGHT LEG
PAIN: 1
MUSCLE WEAKNESS: 1
PERSON REPORTING PAIN: PATIENT
LOWEST PAIN SEVERITY IN PAST 24 HOURS: 2/10
HIGHEST PAIN SEVERITY IN PAST 24 HOURS: 0/10
PERSON REPORTING PAIN: PATIENT
PERSON REPORTING PAIN: PATIENT
PAIN SEVERITY GOAL: 0/10
PAIN LOCATION - PAIN SEVERITY: 1/10
PAIN: 1
PAIN LOCATION - PAIN DURATION: HOURS
PERSON REPORTING PAIN: PATIENT
VOMITING: DENIES
FATIGUE: 1
NAUSEA: DENIES
PAIN LOCATION - PAIN FREQUENCY: INTERMITTENT
DIFFICULTY THINKING: 1
PERSON REPORTING PAIN: PATIENT
DENIES PAIN: 1
PERSON REPORTING PAIN: PATIENT
PAIN SEVERITY GOAL: 0/10
DENIES PAIN: 1
PERSON REPORTING PAIN: PATIENT
POOR JUDGMENT: 1
HIGHEST PAIN SEVERITY IN PAST 24 HOURS: 0/10
PERSON REPORTING PAIN: PATIENT
PAIN: 1
PERSON REPORTING PAIN: PATIENT
PAIN LOCATION - PAIN QUALITY: DULL, ACHY
DIFFICULTY THINKING: 1
PAIN LOCATION - PAIN SEVERITY: 5/10
SUBJECTIVE PAIN PROGRESSION: UNCHANGED
LOWEST PAIN SEVERITY IN PAST 24 HOURS: 0/10
PAIN LOCATION - PAIN QUALITY: ACHING
LOWEST PAIN SEVERITY IN PAST 24 HOURS: 0/10
PERSON REPORTING PAIN: PATIENT
PAIN LOCATION - PAIN FREQUENCY: FREQUENT
PERSON REPORTING PAIN: PATIENT
LOWEST PAIN SEVERITY IN PAST 24 HOURS: 0/10
VOMITING: DENIES
HIGHEST PAIN SEVERITY IN PAST 24 HOURS: 5/10
PERSON REPORTING PAIN: PATIENT
DIFFICULTY THINKING: 1
PAIN LOCATION - PAIN FREQUENCY: INTERMITTENT
VOMITING: DENIES
MUSCLE WEAKNESS: 1
LOWEST PAIN SEVERITY IN PAST 24 HOURS: 4/10
PAIN LOCATION - PAIN QUALITY: DULL, ACHY
MUSCLE WEAKNESS: 1
POOR JUDGMENT: 1
PAIN LOCATION: RIGHT LEG
PERSON REPORTING PAIN: PATIENT
PAIN LOCATION - PAIN DURATION: DAILY
DIFFICULTY THINKING: 1
PAIN LOCATION - PAIN DURATION: FEW WEEKS
PAIN LOCATION - PAIN DURATION: DAILY
SHORTNESS OF BREATH: 1
PAIN LOCATION - PAIN FREQUENCY: FREQUENT
ASSOCIATED SYMPTOMS: DENIES
PERSON REPORTING PAIN: PATIENT
PAIN LOCATION - PAIN DURATION: DAILY
NAUSEA: DENIES
PAIN LOCATION - PAIN DURATION: A FEW MONTHS
PAIN: 1
DIFFICULTY THINKING: 1
PAIN: 1
HIGHEST PAIN SEVERITY IN PAST 24 HOURS: 0/10
PAIN LOCATION - PAIN DURATION: SINCE FALL
PERSON REPORTING PAIN: PATIENT
PERSON REPORTING PAIN: PATIENT
LOWEST PAIN SEVERITY IN PAST 24 HOURS: 0/10
HIGHEST PAIN SEVERITY IN PAST 24 HOURS: 0/10
HIGHEST PAIN SEVERITY IN PAST 24 HOURS: 0/10
FATIGUE: 1
VOMITING: 0
PERSON REPORTING PAIN: PATIENT
PAIN LOCATION - PAIN SEVERITY: 5/10
PAIN: 1
COUGH CHARACTERISTICS: MOIST
PAIN LOCATION - PAIN QUALITY: ACHING
PAIN LOCATION - PAIN QUALITY: ACHY
BOWEL PATTERN NORMAL: 1
HIGHEST PAIN SEVERITY IN PAST 24 HOURS: 0/10
PAIN: 1
HIGHEST PAIN SEVERITY IN PAST 24 HOURS: 0/10
SLEEP QUALITY: ADEQUATE
POOR JUDGMENT: 1
PAIN LOCATION - RELIEVING FACTORS: REST
DIFFICULTY THINKING: 1
DENIES PAIN: 1
LOWEST PAIN SEVERITY IN PAST 24 HOURS: 1/10
FATIGUE: 1
PERSON REPORTING PAIN: PATIENT
PAIN LOCATION - RELIEVING FACTORS: PAIN MEDICATION
MUSCLE WEAKNESS: 1
HIGHEST PAIN SEVERITY IN PAST 24 HOURS: 0/10
SUBJECTIVE PAIN PROGRESSION: UNCHANGED
PAIN LOCATION - PAIN FREQUENCY: FREQUENT
PERSON REPORTING PAIN: PATIENT
SEVERE DYSPNEA: 1
FATIGUE: 1
MUSCLE WEAKNESS: 1
PAIN LOCATION - RELIEVING FACTORS: REST
FEVER: 0
MUSCLE WEAKNESS: 1
PAIN LOCATION: LOWER BACK
MUSCLE WEAKNESS: 1
PAIN LOCATION - PAIN QUALITY: ACHING
DENIES PAIN: 1
PAIN LOCATION - PAIN QUALITY: ACHY
PAIN LOCATION - PAIN SEVERITY: 5/10
LOWEST PAIN SEVERITY IN PAST 24 HOURS: 0/10
PAIN LOCATION - PAIN FREQUENCY: INTERMITTENT
PAIN LOCATION: BACK
PAIN SEVERITY GOAL: 0/10
SHORTNESS OF BREATH: 0
VOMITING: DENIES
LOWEST PAIN SEVERITY IN PAST 24 HOURS: 0/10
NAUSEA: DENIES
LOWEST PAIN SEVERITY IN PAST 24 HOURS: 0/10
LOWEST PAIN SEVERITY IN PAST 24 HOURS: 0/10
PERSON REPORTING PAIN: PATIENT
PALPITATIONS: 0
PERSON REPORTING PAIN: PATIENT
SUBJECTIVE PAIN PROGRESSION: WAXING AND WANING
NAUSEA: DENIES
PERSON REPORTING PAIN: PATIENT
PAIN SEVERITY GOAL: 1/10
PERSON REPORTING PAIN: PATIENT
LOWEST PAIN SEVERITY IN PAST 24 HOURS: 2/10
DENIES PAIN: 1
NAUSEA: DENIES
HIGHEST PAIN SEVERITY IN PAST 24 HOURS: 0/10
PERSON REPORTING PAIN: PATIENT
PERSON REPORTING PAIN: PATIENT
NAUSEA: DENIES
FATIGUE: 1
DYSPNEA ON EXERTION: 1
SUBJECTIVE PAIN PROGRESSION: WAXING AND WANING
PERSON REPORTING PAIN: PATIENT
PERSON REPORTING PAIN: PATIENT
PAIN LOCATION: LOWER BACK
PAIN LOCATION - RELIEVING FACTORS: PAIN MEDICATION
PAIN SEVERITY GOAL: 0/10
PAIN LOCATION: BACK
NAUSEA: DENIES
LOWEST PAIN SEVERITY IN PAST 24 HOURS: 0/10
PAIN: 1
MUSCLE WEAKNESS: 1
PERSON REPORTING PAIN: PATIENT
HIGHEST PAIN SEVERITY IN PAST 24 HOURS: 4/10
VOMITING: DENIES
PAIN SEVERITY GOAL: 0/10
POOR JUDGMENT: 1
PAIN LOCATION - PAIN QUALITY: ACHING
PAIN: 1
LOWEST PAIN SEVERITY IN PAST 24 HOURS: 0/10
FATIGUE: 1
PAIN: 1
PERSON REPORTING PAIN: PATIENT
MUSCLE WEAKNESS: 1
FATIGUE: 1
PAIN SEVERITY GOAL: 0/10
PAIN LOCATION: RIGHT LEG
DENIES PAIN: 1
EYES NEGATIVE: 1
PERSON REPORTING PAIN: PATIENT
BOWEL PATTERN NORMAL: 1
LOWEST PAIN SEVERITY IN PAST 24 HOURS: 0/10
LAST BOWEL MOVEMENT: 66428
PAIN LOCATION - PAIN FREQUENCY: CONSTANT
PAIN: 1
PAIN SEVERITY GOAL: 0/10
PERSON REPORTING PAIN: PATIENT
POOR JUDGMENT: 1
DENIES PAIN: 1
HIGHEST PAIN SEVERITY IN PAST 24 HOURS: 0/10
PAIN LOCATION - RELIEVING FACTORS: PAIN MEDICATION
PAIN LOCATION - PAIN DURATION: FEW MINUTES
DENIES PAIN: 1
NAUSEA: DENIES
PAIN LOCATION - PAIN FREQUENCY: FREQUENT
LOWEST PAIN SEVERITY IN PAST 24 HOURS: 0/10
DENIES PAIN: 1
LOWEST PAIN SEVERITY IN PAST 24 HOURS: 0/10
VOMITING: DENIES
PAIN SEVERITY GOAL: 0/10
PERSON REPORTING PAIN: PATIENT
PAIN LOCATION - PAIN SEVERITY: 3/10
PERSON REPORTING PAIN: PATIENT
PAIN SEVERITY GOAL: 0/10
PAIN SEVERITY GOAL: 0/10
PAIN LOCATION - EXACERBATING FACTORS: MOVEMENT
DENIES PAIN: 1
LOWEST PAIN SEVERITY IN PAST 24 HOURS: 0/10
FORGETFULNESS: 1
HIGHEST PAIN SEVERITY IN PAST 24 HOURS: 0/10
PERSON REPORTING PAIN: PATIENT
PAIN LOCATION - PAIN SEVERITY: 1/10
PAIN LOCATION - PAIN DURATION: DAILY
DENIES PAIN: 1
PERSON REPORTING PAIN: PATIENT
PAIN SEVERITY GOAL: 1/10
HIGHEST PAIN SEVERITY IN PAST 24 HOURS: 4/10
NAUSEA: DENIES
SUBJECTIVE PAIN PROGRESSION: WAXING AND WANING
FATIGUE: 1
PAIN SEVERITY GOAL: 0/10
PERSON REPORTING PAIN: PATIENT
DENIES PAIN: 1
PAIN SEVERITY GOAL: 0/10
DIFFICULTY THINKING: 1
PAIN: 1
PAIN: 1
DENIES PAIN: 1
PAIN LOCATION - EXACERBATING FACTORS: STANDING, WALKING
DENIES PAIN: 1
FATIGUE: 1
PAIN LOCATION - RELIEVING FACTORS: PAIN MEDICATION
PAIN SEVERITY GOAL: 0/10
PAIN SEVERITY GOAL: 0/10
PAIN LOCATION - PAIN SEVERITY: 2/10
PAIN LOCATION - PAIN QUALITY: ACHING
NAUSEA: DENIES
MUSCLE WEAKNESS: 1
PAIN LOCATION: LOWER BACK
HIGHEST PAIN SEVERITY IN PAST 24 HOURS: 4/10
LOWEST PAIN SEVERITY IN PAST 24 HOURS: 0/10
VOMITING: DENIES
PERSON REPORTING PAIN: PATIENT
DENIES PAIN: 1
PAIN SEVERITY GOAL: 0/10
PAIN LOCATION - PAIN FREQUENCY: FREQUENT
PAIN: 1
PAIN SEVERITY GOAL: 0/10
DENIES PAIN: 1
PAIN LOCATION - RELIEVING FACTORS: TYLENOL
PERSON REPORTING PAIN: PATIENT
LOWEST PAIN SEVERITY IN PAST 24 HOURS: 0/10
MUSCLE WEAKNESS: 1
NAUSEA: DENIES
SUBJECTIVE PAIN PROGRESSION: WAXING AND WANING
HIGHEST PAIN SEVERITY IN PAST 24 HOURS: 4/10
NAUSEA: DENIES
PAIN LOCATION - PAIN SEVERITY: 2/10
PAIN: 1
SUBJECTIVE PAIN PROGRESSION: GRADUALLY IMPROVING
FATIGUE: 1
PAIN LOCATION - PAIN FREQUENCY: INFREQUENT
NAUSEA: DENIES
NAUSEA: DENIES
PAIN LOCATION - EXACERBATING FACTORS: STANDING, WALKING, MOVING
PAIN LOCATION - PAIN SEVERITY: 3/10
PAIN SEVERITY GOAL: 0/10
PERSON REPORTING PAIN: PATIENT
DIFFICULTY THINKING: 1
PAIN SEVERITY GOAL: 0/10
PAIN: 1
NAUSEA: DENIES
PAIN: 1
PAIN LOCATION - PAIN FREQUENCY: FREQUENT
LOWEST PAIN SEVERITY IN PAST 24 HOURS: 0/10
FORGETFULNESS: 1
PAIN LOCATION: LOWER BACK
HIGHEST PAIN SEVERITY IN PAST 24 HOURS: 1/10
PAIN SEVERITY GOAL: 0/10
DENIES PAIN: 1
PERSON REPORTING PAIN: PATIENT
FATIGUE: 1
POOR JUDGMENT: 1
HIGHEST PAIN SEVERITY IN PAST 24 HOURS: 6/10
PAIN LOCATION: RIGHT ANKLE
POOR JUDGMENT: 1
DYSPNEA ACTIVITY LEVEL: AFTER AMBULATING 10 - 20 FT
PAIN LOCATION: BACK
HIGHEST PAIN SEVERITY IN PAST 24 HOURS: 0/10
PERSON REPORTING PAIN: PATIENT
PAIN LOCATION - PAIN QUALITY: ACHING, BURNING
SUBJECTIVE PAIN PROGRESSION: UNCHANGED
PAIN SEVERITY GOAL: 3/10
ANGER WITHIN DEFINED LIMITS: 1
PAIN LOCATION - RELIEVING FACTORS: PAIN MEDS, REST, REPOSITIONING
PERSON REPORTING PAIN: PATIENT
PAIN SEVERITY GOAL: 0/10
HIGHEST PAIN SEVERITY IN PAST 24 HOURS: 0/10
DIFFICULTY THINKING: 1
NAUSEA: DENIES
DENIES PAIN: 1
HIGHEST PAIN SEVERITY IN PAST 24 HOURS: 5/10
PAIN SEVERITY GOAL: 0/10
PAIN LOCATION - PAIN QUALITY: ACHING, BURNING
VOMITING: DENIES
DENIES PAIN: 1
MUSCULOSKELETAL NEGATIVE: 1
HIGHEST PAIN SEVERITY IN PAST 24 HOURS: 4/10
PAIN LOCATION - PAIN SEVERITY: 2/10
FATIGUES EASILY: 1
PERSON REPORTING PAIN: PATIENT
PAIN SEVERITY GOAL: 0/10
PAIN: 1
LOWEST PAIN SEVERITY IN PAST 24 HOURS: 0/10
HIGHEST PAIN SEVERITY IN PAST 24 HOURS: 3/10
LOWEST PAIN SEVERITY IN PAST 24 HOURS: 2/10
DENIES PAIN: 1
PAIN: 1
HIGHEST PAIN SEVERITY IN PAST 24 HOURS: 0/10
DIFFICULTY THINKING: 1
PAIN LOCATION - EXACERBATING FACTORS: NOTHING
WEIGHT LOSS: 0
DENIES PAIN: 1
PAIN LOCATION - PAIN DURATION: DAILY
PAIN LOCATION - PAIN FREQUENCY: FREQUENT
SLEEP QUALITY: ADEQUATE
LOWEST PAIN SEVERITY IN PAST 24 HOURS: 0/10
LOWEST PAIN SEVERITY IN PAST 24 HOURS: 0/10
DIFFICULTY THINKING: 1
PERSON REPORTING PAIN: PATIENT
DENIES PAIN: 1
FATIGUE: 1
POOR JUDGMENT: 1
PERSON REPORTING PAIN: PATIENT
VOMITING: DENIES
PAIN LOCATION: BACK
PAIN SEVERITY GOAL: 0/10
DENIES PAIN: 1
FATIGUE: 1
FATIGUE: 1
PAIN LOCATION: RIGHT LEG
PAIN SEVERITY GOAL: 0/10
PAIN: 1
NAUSEA: DENIES
PAIN LOCATION - PAIN FREQUENCY: INTERMITTENT
PAIN LOCATION: BACK
POOR JUDGMENT: 1
HIGHEST PAIN SEVERITY IN PAST 24 HOURS: 2/10
PERSON REPORTING PAIN: PATIENT
PAIN LOCATION - RELIEVING FACTORS: REST, PAIN MEDS, REPOSITIONING
PAIN LOCATION - RELIEVING FACTORS: REST
PERSON REPORTING PAIN: PATIENT
PAIN LOCATION - PAIN SEVERITY: 1/10
HIGHEST PAIN SEVERITY IN PAST 24 HOURS: 0/10
LOWEST PAIN SEVERITY IN PAST 24 HOURS: 1/10
DENIES PAIN: 1
PERSON REPORTING PAIN: PATIENT
MUSCLE WEAKNESS: 1
BOWEL PATTERN NORMAL: 1
PAIN LOCATION - EXACERBATING FACTORS: NOTHING
PERSON REPORTING PAIN: PATIENT
PAIN LOCATION - RELIEVING FACTORS: PAIN MEDS, REST, REPOSITIONING
PAIN LOCATION - PAIN FREQUENCY: INTERMITTENT
PAIN: 1
DIZZINESS: 0
PERSON REPORTING PAIN: PATIENT
DENIES PAIN: 1
PAIN: 1
PERSON REPORTING PAIN: PATIENT
PAIN LOCATION: BACK
PAIN LOCATION - PAIN QUALITY: ACHING
SLEEP QUALITY: ADEQUATE
ASSOCIATED SYMPTOMS: DENIES
PAIN LOCATION - PAIN SEVERITY: 3/10
PERSON REPORTING PAIN: PATIENT
LOWEST PAIN SEVERITY IN PAST 24 HOURS: 0/10
PAIN SEVERITY GOAL: 0/10
PAIN LOCATION - PAIN FREQUENCY: INTERMITTENT
PAIN: 1
PAIN SEVERITY GOAL: 0/10
PAIN LOCATION - PAIN SEVERITY: 3/10
LAST BOWEL MOVEMENT: 66421
DYSPNEA ACTIVITY LEVEL: AT REST
PAIN SEVERITY GOAL: 0/10
LOWEST PAIN SEVERITY IN PAST 24 HOURS: 0/10
PAIN LOCATION - RELIEVING FACTORS: REST, REPOSITIONING
FATIGUE: 1
PAIN: 1
PAIN LOCATION - RELIEVING FACTORS: REST, REPOSITIONING
DESCRIPTION OF MEMORY LOSS: SHORT TERM
NAUSEA: DENIES
DENIES PAIN: 1
NAUSEA: DENIES
DESCRIPTION OF MEMORY LOSS: SHORT TERM
DIFFICULTY THINKING: 1
PERSON REPORTING PAIN: PATIENT
HIGHEST PAIN SEVERITY IN PAST 24 HOURS: 0/10
PAIN SEVERITY GOAL: 1/10
FATIGUE: 1
PAIN LOCATION - PAIN DURATION: DAILY
LOWEST PAIN SEVERITY IN PAST 24 HOURS: 0/10
PERSON REPORTING PAIN: PATIENT
PAIN SEVERITY GOAL: 1/10
DENIES PAIN: 1
PAIN SEVERITY GOAL: 0/10
MUSCLE WEAKNESS: 1
PAIN LOCATION - RELIEVING FACTORS: PAIN MEDS, REST, REPOSITIONING
NAUSEA: DENIES
PAIN SEVERITY GOAL: 0/10
ASSOCIATED SYMPTOMS: DENIES
PAIN LOCATION - PAIN DURATION: SINCE FALL
PAIN SEVERITY GOAL: 4/10
PERSON REPORTING PAIN: PATIENT
DYSPNEA ACTIVITY LEVEL: AT REST
PAIN SEVERITY GOAL: 0/10
PERSON REPORTING PAIN: PATIENT
SUBJECTIVE PAIN PROGRESSION: UNCHANGED
SHORTNESS OF BREATH: 1
PAIN LOCATION - RELIEVING FACTORS: PAIN MEDS, REST, REPOSITIONING
DENIES PAIN: 1
PAIN: 1
COUGH: 0
VOMITING: DENIES
DIFFICULTY THINKING: 1
PERSON REPORTING PAIN: PATIENT
VOMITING: DENIES
LOWEST PAIN SEVERITY IN PAST 24 HOURS: 0/10
PAIN LOCATION - PAIN SEVERITY: 2/10
VOMITING: DENIES
VOMITING: DENIES
DENIES PAIN: 1
LOWEST PAIN SEVERITY IN PAST 24 HOURS: 0/10
HIGHEST PAIN SEVERITY IN PAST 24 HOURS: 0/10
HIGHEST PAIN SEVERITY IN PAST 24 HOURS: 0/10
PERSON REPORTING PAIN: PATIENT
SLEEP QUALITY: ADEQUATE
LOWEST PAIN SEVERITY IN PAST 24 HOURS: 5/10
FATIGUE: 1
HIGHEST PAIN SEVERITY IN PAST 24 HOURS: 4/10
DEPRESSED MOOD: 1
PAIN SEVERITY GOAL: 0/10
MUSCLE WEAKNESS: 1
VOMITING: DENIES
PERSON REPORTING PAIN: PATIENT
MUSCLE WEAKNESS: 1
PAIN LOCATION - PAIN QUALITY: DULL, ACHY
NAUSEA: DENIES
HIGHEST PAIN SEVERITY IN PAST 24 HOURS: 8/10
PAIN LOCATION - PAIN QUALITY: DULL
MUSCLE WEAKNESS: 1
PAIN LOCATION - PAIN DURATION: FEW MINUTES
SEVERE DYSPNEA: 1
PERSON REPORTING PAIN: PATIENT
PAIN LOCATION: BACK
FATIGUE: 1
PERSON REPORTING PAIN: PATIENT
PAIN SEVERITY GOAL: 0/10
LOWEST PAIN SEVERITY IN PAST 24 HOURS: 0/10
LOWEST PAIN SEVERITY IN PAST 24 HOURS: 0/10
HIGHEST PAIN SEVERITY IN PAST 24 HOURS: 0/10
PAIN LOCATION - PAIN DURATION: DAILY
PAIN: 1
HIGHEST PAIN SEVERITY IN PAST 24 HOURS: 0/10
LAST BOWEL MOVEMENT: 66427
PAIN SEVERITY GOAL: 0/10
LOWEST PAIN SEVERITY IN PAST 24 HOURS: 0/10
PERSON REPORTING PAIN: PATIENT
LAST BOWEL MOVEMENT: 66428
SUBJECTIVE PAIN PROGRESSION: WAXING AND WANING
PAIN LOCATION: RIGHT LEG
HEADACHES: 0
VOMITING: DENIES
PERSON REPORTING PAIN: PATIENT
PAIN LOCATION - PAIN QUALITY: ACHING
PERSON REPORTING PAIN: PATIENT
FATIGUE: 1
LOWEST PAIN SEVERITY IN PAST 24 HOURS: 0/10
HIGHEST PAIN SEVERITY IN PAST 24 HOURS: 0/10
LOWEST PAIN SEVERITY IN PAST 24 HOURS: 0/10
DIFFICULTY THINKING: 1
DENIES PAIN: 1
PAIN LOCATION - RELIEVING FACTORS: PAIN MEDS, REST
FATIGUE: 1
PERSON REPORTING PAIN: PATIENT
PAIN LOCATION: BACK
PAIN LOCATION - PAIN DURATION: DAILY
PAIN SEVERITY GOAL: 1/10
HIGHEST PAIN SEVERITY IN PAST 24 HOURS: 0/10
HIGHEST PAIN SEVERITY IN PAST 24 HOURS: 0/10
PAIN LOCATION - PAIN SEVERITY: 2/10
PAIN SEVERITY GOAL: 0/10
DENIES PAIN: 1
PAIN LOCATION - RELIEVING FACTORS: REST
VOMITING: DENIES
FATIGUE: 1
PAIN: 1
DIFFICULTY THINKING: 1
HIGHEST PAIN SEVERITY IN PAST 24 HOURS: 5/10
LOWEST PAIN SEVERITY IN PAST 24 HOURS: 0/10
PAIN SEVERITY GOAL: 0/10
HIGHEST PAIN SEVERITY IN PAST 24 HOURS: 0/10
PAIN LOCATION - PAIN SEVERITY: 1/10
VOMITING: DENIES
PAIN: 1
DENIES PAIN: 1
PAIN LOCATION: RIGHT LEG
PAIN: 1
PAIN SEVERITY GOAL: 0/10
MUSCLE WEAKNESS: 1
PAIN LOCATION - RELIEVING FACTORS: MEDICATIONS
POOR JUDGMENT: 1
PERSON REPORTING PAIN: PATIENT
POOR JUDGMENT: 1
PAIN LOCATION - PAIN FREQUENCY: INTERMITTENT
PERSON REPORTING PAIN: PATIENT
PAIN LOCATION - PAIN FREQUENCY: INTERMITTENT
FATIGUES EASILY: 1
SLEEP QUALITY: ADEQUATE
NAUSEA: DENIES

## 2022-01-01 ASSESSMENT — ACTIVITIES OF DAILY LIVING (ADL)
AMBULATION ASSISTANCE: 1
AMBULATION_REQUIRES_ASSISTANCE: 1
CURRENT_FUNCTION: STAND BY ASSIST
CURRENT_FUNCTION: ONE PERSON
AMBULATION ASSISTANCE: ONE PERSON
AMBULATION ASSISTANCE: STAND BY ASSIST
BATHING_REQUIRES_ASSISTANCE: 1
ORAL_CARE_ASSESSED: 1
GROOMING_CURRENT_FUNCTION: INDEPENDENT
FEEDING ASSESSED: 1
OASIS_M1830: 05
GROOMING_COMMENTS: SEE OT NOTES
CONTINENCE_REQUIRES_ASSISTANCE: 1
HOME_HEALTH_OASIS: 00
PHYSICAL_TRANSFERS_DEVICES: USE OF B UES TO ASSIST
AMBULATION ASSISTANCE: NON-AMBULATORY
TOILETING: 1
AMBULATION ASSISTANCE: 1
BATHING_COMMENTS: SEE OT NOTES
FEEDING_WITHIN_DEFINED_LIMITS: 1
PHYSICAL TRANSFERS ASSESSED: 1
AMBULATION ASSISTANCE: STAND BY ASSIST
AMBULATION ASSISTANCE ON FLAT SURFACES: 1
PHYSICAL TRANSFERS ASSESSED: 1
CURRENT_FUNCTION: STAND BY ASSIST
PHYSICAL_TRANSFERS_DEVICES: USE OF B UES TO ASSIST
BATHING_COMMENTS: SEE OT NOTES
AMBULATION ASSISTANCE: NON-AMBULATORY
CURRENT_FUNCTION: STAND BY ASSIST
MONEY MANAGEMENT (EXPENSES/BILLS): TOTALLY DEPENDENT
AMBULATION ASSISTANCE: 1
CURRENT_FUNCTION: ONE PERSON
CURRENT_FUNCTION: ONE PERSON
DRESSING_LB_CURRENT_FUNCTION: INDEPENDENT
AMBULATION ASSISTANCE ON FLAT SURFACES: 1
GROOMING_COMMENTS: SEE OT NOTES
TOILETING: STAND BY ASSIST
AMBULATION ASSISTANCE ON FLAT SURFACES: 1
FEEDING_COMMENTS: SEE OT NOTES
GROOMING_WITHIN_DEFINED_LIMITS: 1
BATHING_COMMENTS: SEE OT NOTES
OASIS_M1830: 04
PHYSICAL TRANSFERS ASSESSED: 1
FEEDING_WITHIN_DEFINED_LIMITS: 1
AMBULATION ASSISTANCE: 1
PHYSICAL_TRANSFERS_DEVICES: USE OF B UES TO ASSIST
TELEPHONE USE ASSESSED: 1
AMBULATION ASSISTANCE: STAND BY ASSIST
AMBULATION ASSISTANCE: 1
PHYSICAL_TRANSFERS_DEVICES: USE OF B UES TO ASSIST
USING THE TELPHONE: INDEPENDENT
MONEY MANAGEMENT (EXPENSES/BILLS): TOTALLY DEPENDENT
GROOMING_COMMENTS: SEE OT NOTES
CURRENT_FUNCTION: SUPERVISION
AMBULATION ASSISTANCE: NON-AMBULATORY
FEEDING: INDEPENDENT
WASHING_UPB_CURRENT_FUNCTION: INDEPENDENT
PREPARING MEALS: NEEDS ASSISTANCE
GROOMING_COMMENTS: SEE OT NOTES
AMBULATION ASSISTANCE ON FLAT SURFACES: 1
DRESSING_REQUIRES_ASSISTANCE: 1
ORAL_CARE_CURRENT_FUNCTION: INDEPENDENT
PHYSICAL_TRANSFER_REQUIRES_ASSISTANCE: 1
AMBULATION ASSISTANCE: STAND BY ASSIST
FEEDING_COMMENTS: SEE OT NOTES
PHYSICAL TRANSFERS ASSESSED: 1
FEEDING_COMMENTS: SEE OT NOTES
GROOMING ASSESSED: 1
OASIS_M1830: 05
CURRENT_FUNCTION: ONE PERSON
DRESSING_UB_CURRENT_FUNCTION: INDEPENDENT
PHYSICAL TRANSFERS ASSESSED: 1
FEEDING_WITHIN_DEFINED_LIMITS: 1
CURRENT_FUNCTION: SUPERVISION
AMBULATION ASSISTANCE: STAND BY ASSIST
BATHING_COMMENTS: SEE OT NOTES

## 2022-01-01 ASSESSMENT — FIBROSIS 4 INDEX
FIB4 SCORE: 1.177383539966117409
FIB4 SCORE: 1.177383539966117409
FIB4 SCORE: 1.275362419896548378
FIB4 SCORE: 1.275362419896548378
FIB4 SCORE: 1.12877596262806669
FIB4 SCORE: 1.177383539966117409
FIB4 SCORE: 1.12877596262806669
FIB4 SCORE: 1.177383539966117409
FIB4 SCORE: 1.12877596262806669
FIB4 SCORE: 1.12877596262806669
FIB4 SCORE: 1.177383539966117409
FIB4 SCORE: 1.177383539966117409
FIB4 SCORE: 1.12877596262806669
FIB4 SCORE: 1.177383539966117409
FIB4 SCORE: 1.177383539966117409
FIB4 SCORE: 1.275362419896548378
FIB4 SCORE: 1.177383539966117409
FIB4 SCORE: 1.12877596262806669
FIB4 SCORE: 1.12877596262806669
FIB4 SCORE: 1.275362419896548378
FIB4 SCORE: 1.12877596262806669
FIB4 SCORE: 1.177383539966117409
FIB4 SCORE: 1.12877596262806669
FIB4 SCORE: 1.275362419896548378
FIB4 SCORE: 1.275362419896548378
FIB4 SCORE: 1.177383539966117409
FIB4 SCORE: 1.275362419896548378
FIB4 SCORE: 1.177383539966117409
FIB4 SCORE: 1.12877596262806669
FIB4 SCORE: 1.12877596262806669
FIB4 SCORE: 1.275362419896548378
FIB4 SCORE: 1.177383539966117409
FIB4 SCORE: 1.12877596262806669
FIB4 SCORE: 1.275362419896548378
FIB4 SCORE: 1.29
FIB4 SCORE: 1.12877596262806669
FIB4 SCORE: 1.177383539966117409
FIB4 SCORE: 1.12877596262806669
FIB4 SCORE: 1.275362419896548378
FIB4 SCORE: 1.275362419896548378
FIB4 SCORE: 1.177383539966117409
FIB4 SCORE: 1.275362419896548378
FIB4 SCORE: 1.177383539966117409
FIB4 SCORE: 1.12877596262806669
FIB4 SCORE: 1.177383539966117409
FIB4 SCORE: 1.275362419896548378
FIB4 SCORE: 1.12877596262806669
FIB4 SCORE: 1.12877596262806669
FIB4 SCORE: 1.275362419896548378
FIB4 SCORE: 1.177383539966117409
FIB4 SCORE: 1.177383539966117409
FIB4 SCORE: 1.12877596262806669
FIB4 SCORE: 1.275362419896548378
FIB4 SCORE: 1.12877596262806669
FIB4 SCORE: 1.177383539966117409
FIB4 SCORE: 1.177383539966117409
FIB4 SCORE: 1.12877596262806669
FIB4 SCORE: 1.177383539966117409
FIB4 SCORE: 1.177383539966117409
FIB4 SCORE: 1.12877596262806669

## 2022-01-01 ASSESSMENT — GAIT ASSESSMENTS
TRUNK SCORE: 1
STEP SYMMETRY: 1 - RIGHT AND LEFT STEP LENGTH APPEAR EQUAL
BALANCE AND GAIT SCORE: 17
STEP SYMMETRY: 1 - RIGHT AND LEFT STEP LENGTH APPEAR EQUAL
PATH SCORE: 1
TRUNK SCORE: 1
WALKING STANCE: 0 - HEELS APART
PATH SCORE: 1
PATH SCORE: 1
BALANCE AND GAIT SCORE: 15
BALANCE AND GAIT SCORE: 17
STEP SYMMETRY: 1 - RIGHT AND LEFT STEP LENGTH APPEAR EQUAL
PATH SCORE: 1
TRUNK: 1 - NO SWAY BUT FLEXION OF KNEES OR BACK OR SPREADS ARMS WHILE WALKING
INITIATION OF GAIT IMMEDIATELY AFTER GO: 1 - NO HESITANCY
GAIT SCORE: 9
STEP CONTINUITY: 1 - STEPS APPEAR CONTINUOUS
PATH SCORE: 1
GAIT SCORE: 9
PATH: 1 - MILD/MODERATE DEVIATION OR USES WALKING AID
INITIATION OF GAIT IMMEDIATELY AFTER GO: 1 - NO HESITANCY
TRUNK: 1 - NO SWAY BUT FLEXION OF KNEES OR BACK OR SPREADS ARMS WHILE WALKING
TRUNK SCORE: 0
BALANCE AND GAIT SCORE: 17
TRUNK SCORE: 1
TRUNK: 0 - MARKED SWAY OR USES WALKING AID
WALKING STANCE: 0 - HEELS APART
GAIT SCORE: 9
WALKING STANCE: 0 - HEELS APART
GAIT SCORE: 8
WALKING STANCE: 0 - HEELS APART
PATH: 1 - MILD/MODERATE DEVIATION OR USES WALKING AID
TRUNK: 1 - NO SWAY BUT FLEXION OF KNEES OR BACK OR SPREADS ARMS WHILE WALKING
STEP CONTINUITY: 1 - STEPS APPEAR CONTINUOUS
STEP SYMMETRY: 1 - RIGHT AND LEFT STEP LENGTH APPEAR EQUAL
STEP CONTINUITY: 1 - STEPS APPEAR CONTINUOUS
PATH: 1 - MILD/MODERATE DEVIATION OR USES WALKING AID
TRUNK: 1 - NO SWAY BUT FLEXION OF KNEES OR BACK OR SPREADS ARMS WHILE WALKING
TRUNK SCORE: 1
STEP SYMMETRY: 1 - RIGHT AND LEFT STEP LENGTH APPEAR EQUAL
GAIT SCORE: 9
PATH: 1 - MILD/MODERATE DEVIATION OR USES WALKING AID
PATH: 1 - MILD/MODERATE DEVIATION OR USES WALKING AID
STEP CONTINUITY: 1 - STEPS APPEAR CONTINUOUS
WALKING STANCE: 0 - HEELS APART
BALANCE AND GAIT SCORE: 22
STEP CONTINUITY: 1 - STEPS APPEAR CONTINUOUS
INITIATION OF GAIT IMMEDIATELY AFTER GO: 1 - NO HESITANCY

## 2022-01-01 ASSESSMENT — BALANCE ASSESSMENTS
BALANCE SCORE: 7
EYES CLOSED AT MAXIMUM POSITION NUDGED: 1 - STEADY
NUDGED: 0 - BEGINS TO FALL
IMMEDIATE STANDING BALANCE FIRST 5 SECONDS: 2 - STEADY WITHOUT WALKER OR OTHER SUPPORT
ARISING SCORE: 1
TURNING 360 DEGREES STEPS: 1 - CONTINUOUS STEPS
EYES CLOSED AT MAXIMUM POSITION NUDGED: 0 - UNSTEADY
SITTING BALANCE: 1 - STEADY, SAFE
IMMEDIATE STANDING BALANCE FIRST 5 SECONDS: 1 - STEADY BUT USES WALKER OR OTHER SUPPORT
TURNING 360 DEGREES STEPS: 1 - CONTINUOUS STEPS
NUDGED SCORE: 1
STANDING BALANCE: 1 - STEADY BUT WIDE STANCE AND USES CANE OR OTHER SUPPORT
IMMEDIATE STANDING BALANCE FIRST 5 SECONDS: 1 - STEADY BUT USES WALKER OR OTHER SUPPORT
ARISES: 1 - ABLE, USES ARMS TO HELP
ARISING SCORE: 1
ARISING SCORE: 1
IMMEDIATE STANDING BALANCE FIRST 5 SECONDS: 1 - STEADY BUT USES WALKER OR OTHER SUPPORT
ATTEMPTS TO ARISE: 2 - ABLE TO RISE, ONE ATTEMPT
SITTING DOWN: 1 - USES ARMS OR NOT SMOOTH MOTION
NUDGED SCORE: 0
IMMEDIATE STANDING BALANCE FIRST 5 SECONDS: 1 - STEADY BUT USES WALKER OR OTHER SUPPORT
NUDGED: 2 - STEADY
TURNING 360 DEGREES STEPS: 1 - CONTINUOUS STEPS
ARISING SCORE: 1
ARISES: 1 - ABLE, USES ARMS TO HELP
STANDING BALANCE: 1 - STEADY BUT WIDE STANCE AND USES CANE OR OTHER SUPPORT
ATTEMPTS TO ARISE: 2 - ABLE TO RISE, ONE ATTEMPT
ATTEMPTS TO ARISE: 2 - ABLE TO RISE, ONE ATTEMPT
NUDGED: 1 - STAGGERS, GRABS, CATCHES SELF
ARISES: 1 - ABLE, USES ARMS TO HELP
SITTING DOWN: 1 - USES ARMS OR NOT SMOOTH MOTION
SITTING BALANCE: 1 - STEADY, SAFE
BALANCE SCORE: 8
BALANCE SCORE: 8
ARISES: 1 - ABLE, USES ARMS TO HELP
SITTING BALANCE: 1 - STEADY, SAFE
EYES CLOSED AT MAXIMUM POSITION NUDGED: 0 - UNSTEADY
ATTEMPTS TO ARISE: 1 - ABLE, REQUIRES MORE THAN ONE ATTEMPT
SITTING BALANCE: 1 - STEADY, SAFE
ATTEMPTS TO ARISE: 2 - ABLE TO RISE, ONE ATTEMPT
SITTING BALANCE: 1 - STEADY, SAFE
NUDGED: 0 - BEGINS TO FALL
BALANCE SCORE: 13
NUDGED SCORE: 0
EYES CLOSED AT MAXIMUM POSITION NUDGED: 0 - UNSTEADY
SITTING DOWN: 1 - USES ARMS OR NOT SMOOTH MOTION
STANDING BALANCE: 0 - UNSTEADY
TURNING 360 DEGREES STEPS: 1 - CONTINUOUS STEPS
NUDGED SCORE: 2
SITTING DOWN: 1 - USES ARMS OR NOT SMOOTH MOTION
EYES CLOSED AT MAXIMUM POSITION NUDGED: 0 - UNSTEADY
BALANCE SCORE: 8
ARISES: 1 - ABLE, USES ARMS TO HELP
TURNING 360 DEGREES STEPS: 1 - CONTINUOUS STEPS
NUDGED SCORE: 0
SITTING DOWN: 1 - USES ARMS OR NOT SMOOTH MOTION
ARISING SCORE: 1
STANDING BALANCE: 1 - STEADY BUT WIDE STANCE AND USES CANE OR OTHER SUPPORT
STANDING BALANCE: 1 - STEADY BUT WIDE STANCE AND USES CANE OR OTHER SUPPORT
NUDGED: 0 - BEGINS TO FALL

## 2022-01-01 ASSESSMENT — PATIENT HEALTH QUESTIONNAIRE - PHQ9
CLINICAL INTERPRETATION OF PHQ2 SCORE: 0
2. FEELING DOWN, DEPRESSED, IRRITABLE, OR HOPELESS: 01
CLINICAL INTERPRETATION OF PHQ2 SCORE: 0
CLINICAL INTERPRETATION OF PHQ2 SCORE: 0
SUM OF ALL RESPONSES TO PHQ QUESTIONS 1-9: 6
1. LITTLE INTEREST OR PLEASURE IN DOING THINGS: 01
5. POOR APPETITE OR OVEREATING: 1 - SEVERAL DAYS
CLINICAL INTERPRETATION OF PHQ2 SCORE: 2
CLINICAL INTERPRETATION OF PHQ2 SCORE: 0

## 2022-01-01 ASSESSMENT — PAIN SCALES - PAIN ASSESSMENT IN ADVANCED DEMENTIA (PAINAD)
TOTALSCORE: 2
NEGVOCALIZATION: 1 - OCCASIONAL MOAN OR GROAN. LOW-LEVEL SPEECH WITH A NEGATIVE OR DISAPPROVING QUALITY.
BODYLANGUAGE: 0 - RELAXED.
CONSOLABILITY: 0 - NO NEED TO CONSOLE.
FACIALEXPRESSION: 0 - SMILING OR INEXPRESSIVE.

## 2022-01-01 ASSESSMENT — SOCIAL DETERMINANTS OF HEALTH (SDOH)
ACTIVE STRESSOR - NO STRESS FACTORS: 1

## 2022-01-01 ASSESSMENT — NEW YORK HEART ASSOCIATION (NYHA) CLASSIFICATION: PATIENT MEETS NYHA AND SIGNIFICANT SYMPTOMS CRITERIA: 1

## 2022-01-04 PROBLEM — E04.2 MULTINODULAR GOITER: Status: ACTIVE | Noted: 2020-11-25

## 2022-01-04 PROBLEM — I34.0 NONRHEUMATIC MITRAL VALVE REGURGITATION: Status: ACTIVE | Noted: 2020-11-25

## 2022-01-04 PROBLEM — E87.5 HYPERKALEMIA: Status: ACTIVE | Noted: 2021-01-01

## 2022-01-04 PROBLEM — I35.0 NONRHEUMATIC AORTIC VALVE STENOSIS: Status: ACTIVE | Noted: 2020-11-25

## 2022-01-04 PROBLEM — I25.5 ISCHEMIC CARDIOMYOPATHY: Status: ACTIVE | Noted: 2020-11-25

## 2022-01-04 PROBLEM — Z99.81 DEPENDENCE ON SUPPLEMENTAL OXYGEN: Status: ACTIVE | Noted: 2021-01-01

## 2022-01-04 PROBLEM — E78.5 HYPERLIPIDEMIA: Status: ACTIVE | Noted: 2020-11-25

## 2022-01-04 PROBLEM — M15.9 PRIMARY OSTEOARTHRITIS INVOLVING MULTIPLE JOINTS: Status: ACTIVE | Noted: 2020-11-25

## 2022-01-04 PROBLEM — I45.10 RIGHT BUNDLE BRANCH BLOCK (RBBB): Status: ACTIVE | Noted: 2020-11-25

## 2022-01-04 PROBLEM — I27.20 PULMONARY HYPERTENSION (HCC): Status: ACTIVE | Noted: 2021-01-01

## 2022-01-04 PROBLEM — Z85.3 HISTORY OF BREAST CANCER: Status: ACTIVE | Noted: 2021-03-26

## 2022-01-04 PROBLEM — Z95.1 HX OF CABG: Status: ACTIVE | Noted: 2021-01-01

## 2022-01-04 PROBLEM — M85.89 OSTEOPENIA OF MULTIPLE SITES: Status: ACTIVE | Noted: 2020-11-25

## 2022-01-04 PROBLEM — S22.31XA CLOSED FRACTURE OF RIB OF RIGHT SIDE: Status: ACTIVE | Noted: 2021-01-01

## 2022-01-04 PROBLEM — I25.10 CAD (CORONARY ARTERY DISEASE): Status: ACTIVE | Noted: 2020-11-25

## 2022-01-04 PROBLEM — I10 HYPERTENSION: Status: ACTIVE | Noted: 2020-11-20

## 2022-01-04 PROBLEM — E11.9 TYPE 2 DIABETES MELLITUS WITHOUT COMPLICATION (HCC): Status: ACTIVE | Noted: 2020-11-20

## 2022-01-04 PROBLEM — I50.20 HEART FAILURE WITH REDUCED EJECTION FRACTION, NYHA CLASS III (HCC): Status: ACTIVE | Noted: 2020-11-25

## 2022-01-04 PROBLEM — I50.22 CHRONIC SYSTOLIC HEART FAILURE (HCC): Status: ACTIVE | Noted: 2021-01-01

## 2022-01-04 PROBLEM — E87.1 HYPONATREMIA: Status: ACTIVE | Noted: 2021-01-01

## 2022-01-04 PROBLEM — G89.4 CHRONIC PAIN DISORDER: Status: ACTIVE | Noted: 2020-11-25

## 2022-01-04 PROBLEM — M25.511 RIGHT SHOULDER PAIN: Status: ACTIVE | Noted: 2021-01-01

## 2022-01-04 PROBLEM — R29.6 RECURRENT FALLS: Status: ACTIVE | Noted: 2021-03-26

## 2022-01-04 PROBLEM — M54.30 SCIATICA: Status: ACTIVE | Noted: 2021-01-01

## 2022-01-04 PROBLEM — E11.65 TYPE 2 DIABETES MELLITUS WITH HYPERGLYCEMIA (HCC): Status: ACTIVE | Noted: 2021-01-01

## 2022-01-04 PROBLEM — R55 SYNCOPE AND COLLAPSE: Status: ACTIVE | Noted: 2021-11-20

## 2022-01-04 NOTE — ASSESSMENT & PLAN NOTE
Chronic stable condition   Was following with oncology Dr. 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

## 2022-01-04 NOTE — ASSESSMENT & PLAN NOTE
Chronic and stable condition   Follows with endocrinology Dr. Bustos who   had biopsy in 2020  Biopsy showed benign   We will continue to follow

## 2022-01-04 NOTE — ASSESSMENT & PLAN NOTE
Recent falls  Fell in hospital and fell at home 1 month ago  Has home health for Physical therapy, occupational therapy  Currently has walker/cane for stability

## 2022-01-04 NOTE — ASSESSMENT & PLAN NOTE
Chronic condition   Fall while in hospital resulting in a nondisplaced posterior lateral right ninth rib fracture   Continue spirometer, brace when coughing, lidocaine patches as needed  Denies any difficulty with breathing, shortness of breath

## 2022-01-04 NOTE — ASSESSMENT & PLAN NOTE
Chronic stable condition   Currently using 50 units daily   Also completes daily fingersticks   She follows with Dr. Sue, endocrinology   She currently takes metformin 1000 mg twice daily  Needs diabetic monofilament exam as well as urine microalbumin  She needs follow-up diabetic retinal exam with ophthalmologist   Hemoglobin A1c was 7.6 on 11/20/2021  Denies nocturnal polyuria, numbness, decreased sensation

## 2022-01-04 NOTE — ASSESSMENT & PLAN NOTE
Chronic and stable condition   Currently takes lisinopril, metoprolol   Blood pressure in office 130/60  Denies headache, chest pain, weakness or numbness sensations

## 2022-01-04 NOTE — ASSESSMENT & PLAN NOTE
Chronic exacerbated condition   Was admitted to the hospital 11/20/2021 to 11/22/2021 for syncopal event at home resulting in a fall   States that she has since then had medication changes with have resulted in her syncope improving   She is also currently getting home health for PT and OT  She was following with Dr. Gonzales, cardiology-is requesting new referral  It is unknown if she is continuing to use meclizine

## 2022-01-07 NOTE — PROGRESS NOTES
Chief Complaint   Patient presents with   • Establish Care      Subjective:     Sinai Huang is a 86 y.o. female presenting to establish care and management of below problems. Patient was brought in by her son, Harpal.       Patient was seen at [facility] on 1/20/2021 to 11/22/2021 after a syncopal event.  She states that she was walking the bathroom after having breakfast and lost consciousness.  EKG was completed which shows sinus bradycardia as well as a right bundle branch block is also noted that she had mild hyponatremia and elevated potassium.  During her time in the hospital she had a fall in the bathroom resulting nondisplaced posterior lateral right 9th rib fracture.  She had a follow-up hospital visit with her primary care provider at [facility]    Dependence on supplemental oxygen  Chronic and stable condition   Currently is on oxygen at night       History of breast cancer  Chronic stable condition   Was following with oncology Dr. 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 Bustos who   had biopsy in 2020  Biopsy showed benign   We will continue to follow    Recurrent falls  Recent falls  Fell in hospital and fell at home 1 month ago  Has home health for Physical therapy,  occupational therapy  Currently has walker/cane for stability    Type 2 diabetes mellitus with hyperglycemia (HCC)  Chronic stable condition   Currently using 50 units daily   Also completes daily fingersticks   She follows with Dr. Sue, endocrinology   She currently takes metformin 1000 mg twice daily  Needs diabetic monofilament exam as well as urine microalbumin  She needs follow-up diabetic retinal exam with ophthalmologist   Denies nocturnal polyuria, numbness, decreased sensation      Syncope and collapse  Chronic exacerbated condition   Was admitted to the hospital 11/20/2021 to 11/22/2021 for syncopal event at home resulting in a fall   States that she has since then had medication changes with have resulted in her syncope improving   She is also currently getting home health for PT and OT  She was following with Dr. Gonzales, cardiology-is requesting new referral  It is unknown if she is continuing to use meclizine    Right shoulder pain  After fall in hospital   Sees  for PT and OT    Hyperlipidemia  Chronic and stable condition  Currently taking 20 mg atorvastatin daily tolerating  Continue with statin for treatment and secondary prevention for ASCVD      Heart failure with reduced ejection fraction, NYHA class III (HCC)  Chronic stable condition   Was following with Dr. Monroy, cardiology-requesting a referral   current weight is 110lb  Currently takes Lasix 20 mg 1 times daily as needed, Lopressor, lisinopril 20 mg twice daily  She was taking potassium 20 mEq daily as needed  Recent echo showed a LVEF 40-45 % with a grade 3 diastolic dysfunction which is an improvement from the previous echo on 10/2019  Discussed no salt in diet  Continue to log weight, weigh yourself daily  Denies chest pain, orthopnea, bilateral edema, PND      Obstructive sleep apnea  Chronic and stable condition  Patient had a sleep study completed on 3/25/2021 which showed obstructive sleep apnea  She was post to be getting set  up with a CPAP however is unsure if she has completed this yet  She does currently use oxygen at night    CAD (coronary artery disease)  Chronic and stable condition  Currently takes lisinopril 20 mg twice daily, Lopressor 25 mg twice daily, ASA 81 mg daily, atorvastatin 20 mg daily, Ranexa 500 mg twice daily  Has a prescription for nitroglycerin  Was following with cardiology, Dr. Monroy-requesting new referral  Denies any chest pain, shortness of breath, palpitations of heart    Osteopenia of multiple sites  Chronic and stable condition  Last DEXA scan 12/2020 with osteopenia  Currently takes vitamin D, calcium  Uses cane to get around  Does have history of falls      Sciatica  Chronic and stable condition  States bilaterally with the right greater than left    Chronic pain disorder  Uses Tylenol, gabapentin 200 mg every night, cyclobenzaprine 5 mg 1 times daily as needed    Right bundle branch block (RBBB)  Unknown if this was noted only in the hospital   she was cardiology, Dr. Monroy-requesting new referral       ROS   See HPI    Current Outpatient Medications:   •  albuterol 108 (90 Base) MCG/ACT Aero Soln inhalation aerosol, Inhale 2 Puffs every 6 hours as needed., Disp: , Rfl:   •  cyclobenzaprine (FLEXERIL) 5 mg tablet, Take 1 Tablet by mouth 1 time a day as needed., Disp: 15 Tablet, Rfl: 0  •  metoprolol tartrate (LOPRESSOR) 25 MG Tab, Take 25 mg by mouth 2 times a day., Disp: , Rfl:   •  meclizine (ANTIVERT) 12.5 MG Tab, Take 12.5 mg by mouth 1 time a day as needed for Dizziness., Disp: , Rfl:   •  Pyridoxine HCl (VITAMIN B6) 100 MG Tab, Take 100 mg by mouth every day., Disp: , Rfl:   •  Multiple Vitamins-Minerals (MULTIVITAMIN WOMEN) Tab, Take 1 Tablet by mouth every day., Disp: , Rfl:   •  Home Care Oxygen, Inhale 1 L/min as needed for Shortness of Breath (shortness of breath). patient has own concentrator Oxygen dose range: 1 L/min Respiratory route via: Nasal Cannula  Oxygen supplier: [employer]  , Disp: , Rfl:   •  Acetaminophen 500 MG Cap, Take 1,000 mg by mouth every 6 hours as needed (pain)., Disp: , Rfl:   •  ranolazine (RANEXA) 500 MG TABLET SR 12 HR, Take 500 mg by mouth 2 times a day., Disp: , Rfl:   •  nitroglycerin (NITROSTAT) 0.4 MG SL Tab, Place 0.4 mg under the tongue as needed for Chest Pain., Disp: , Rfl:   •  gabapentin (NEURONTIN) 100 MG Cap, Take 200 mg by mouth at bedtime. Indications: Neuropathic Pain, Disp: , Rfl:   •  furosemide (LASIX) 20 MG Tab, Take 20 mg by mouth 1 time a day as needed (edema, wt gain of 2 pounds or more overnight ). Indications: Edema, Disp: , Rfl:   •  BABY ASPIRIN PO, Take 81 mg by mouth 2 times a day. change in dosage from hospital  Indications: cardiac, Disp: , Rfl:   •  EPINEPHrine (PRIMATENE MIST INH), Inhale 1 Inhalation 1 time a day as needed (sob). Indications: sob, Disp: , Rfl:   •  atorvastatin (LIPITOR) 20 MG Tab, Take 20 mg by mouth every evening., Disp: , Rfl:   •  lisinopril (PRINIVIL) 20 MG Tab, Take 20 mg by mouth 2 times a day. Take 1 tablet by mouth twice a day, Disp: , Rfl:   •  metformin (GLUCOPHAGE) 1000 MG tablet, Take 1,000 mg by mouth 2 times a day, with meals., Disp: , Rfl:   •  Spacer/Aero-Holding Chambers Device, 1 Device by Does not apply route as needed. (Patient taking differently: 1 Device as needed (for use with inhaler).), Disp: 1 Device, Rfl: 0  •  PX Lancets Ultra Thin 28G Misc, test 3 times a day (Patient not taking: Reported on 1/4/2022), Disp: , Rfl:   •  lidocaine (LIDODERM) 5 % Patch, , Disp: , Rfl:   •  insulin glargine (LANTUS SOLOSTAR) 100 UNIT/ML Solution Pen-injector injection, Inject 15 Units under the skin every day., Disp: , Rfl:   •  potassium chloride SA (K-DUR) 10 MEQ Tab CR, Take 20 mEq by mouth 1 time a day as needed (supplement). Indications: Low Amount of Potassium in the Blood, take with lasix (Patient not taking: Reported on 1/4/2022), Disp: , Rfl:     Past Medical History:   Diagnosis Date   •  Type 2 diabetes mellitus without complication (HCC) 11/20/2020       History reviewed. No pertinent surgical history.    History reviewed. No pertinent family history.    Patient has no known allergies.    Allergies, past medical history, past surgical history, family history, social history reviewed and updated    Objective:     Vitals: /60 (BP Location: Right arm, Patient Position: Sitting, BP Cuff Size: Adult)   Pulse 74   Temp 36.9 °C (98.4 °F) (Temporal)   Resp 18   Ht 1.524 m (5')   Wt 51.3 kg (113 lb)   SpO2 94%   BMI 22.07 kg/m²   General: Alert, pleasant, NAD  EYES:   PERRL, EOMI, no icterus or pallor.  Conjunctivae and lids normal.   HENT:  Normocephalic.  External ears normal. Tympanic membranes pearly, opaque.  No nasal drainage present.  Oropharynx non-erythematous, mucous membranes moist.  Neck supple.   No thyromegaly or masses palpated. No cervical or supraclavicular lymphadenopathy.  Heart: Regular rate and rhythm.  S1 and S2 normal.  No murmurs appreciated.  Respiratory: Normal respiratory effort.  Clear to auscultation bilaterally.  Abdomen: Non-distended, soft, non-tender, no guarding/rebound. Bowel sounds present.  No hepatosplenomegaly.  No hernias.  Skin: Warm, dry, no rashes.  Musculoskeletal: Gait is normal.  Moves all extremities well.    Extremities: No clubbing, cyanosis or edema noted.   Neurological: No tremors, sensation grossly intact, tone/strength normal, gait is normal, CN2-12 intact.  Psych:  Affect/mood is normal, judgement is good, memory is intact, grooming is appropriate.    Assessment/Plan:     1. Need for vaccination  - Influenza Vaccine, High Dose (65+ Only)    2. Dependence on supplemental oxygen  Continue with home oxygen    3. History of breast cancer  Continue to monitor  We will discuss possible mammograms yearly if she would like    4. Hx of CABG  - REFERRAL TO CARDIOLOGY    5. Hypertension, unspecified type  - REFERRAL TO CARDIOLOGY  Continue with  lisinopril, metoprolol    6. Multinodular goiter  - Referral to Endocrinology  Requesting referral    7. Recurrent falls  Continue to monitor  Continue with home health    8. Type 2 diabetes mellitus with hyperglycemia, with long-term current use of insulin ([facility])  Referral placed for endocrinology  Continue with insulin  Continue with metformin  Continue with fingersticks    9. Syncope and collapse  - REFERRAL TO CARDIOLOGY    10. Acute pain of right shoulder  Continue with home health    11. Chronic right-sided low back pain with right-sided sciatica  - Referral to Pain Clinic  - cyclobenzaprine (FLEXERIL) 5 mg tablet; Take 1 Tablet by mouth 1 time a day as needed.  Dispense: 15 Tablet; Refill: 0    12. Sciatica of right side  - Referral to Pain Clinic  - cyclobenzaprine (FLEXERIL) 5 mg tablet; Take 1 Tablet by mouth 1 time a day as needed.  Dispense: 15 Tablet; Refill: 0    13. Back muscle spasm  - cyclobenzaprine (FLEXERIL) 5 mg tablet; Take 1 Tablet by mouth 1 time a day as needed.  Dispense: 15 Tablet; Refill: 0       Discussed with patient possible alternative diagnoses, patient is to take all medications as prescribed.     If symptoms persist FU w/PCP, if symptoms worsen go to emergency room.     If experiencing any side effects from prescribed medications reports to the office immediately or go to emergency room.    Reviewed indication, dosage, usage and potential adverse effects of prescribed medications.     Reviewed risks and benefits of treatment plan. Patient verbalizes understanding of all instruction and verbally agrees to plan.    Discussed plan with the patient, and she agrees to the above.      I personally reviewed prior external notes and test results pertinent to today's visit.        No follow-ups on file.    My total time spent caring for the patient on the day of the encounter was 30 minutes.   This does not include time spent on separately billable procedures/tests.     Please note that this  dictation was created using voice recognition software. I have made every reasonable attempt to correct obvious errors, but I expect that there may be errors of grammar and possibly content that I did not discover before finalizing the note.

## 2022-01-12 PROBLEM — E11.9 TYPE 2 DIABETES MELLITUS WITHOUT COMPLICATION (HCC): Status: RESOLVED | Noted: 2020-11-20 | Resolved: 2022-01-01

## 2022-01-12 PROBLEM — G47.33 OBSTRUCTIVE SLEEP APNEA: Status: ACTIVE | Noted: 2022-01-01

## 2022-01-12 NOTE — ASSESSMENT & PLAN NOTE
Unknown if this was noted only in the hospital   she was cardiology, Dr. Monroy-requesting new referral

## 2022-01-12 NOTE — ASSESSMENT & PLAN NOTE
Chronic and stable condition  Currently takes lisinopril 20 mg twice daily, Lopressor 25 mg twice daily, ASA 81 mg daily, atorvastatin 20 mg daily, Ranexa 500 mg twice daily  Has a prescription for nitroglycerin  Was following with cardiology, Dr. Monroy-requesting new referral  Denies any chest pain, shortness of breath, palpitations of heart

## 2022-01-12 NOTE — ASSESSMENT & PLAN NOTE
Chronic and stable condition  Last DEXA scan 12/2020 with osteopenia  Currently takes vitamin D, calcium  Uses cane to get around  Does have history of falls

## 2022-01-12 NOTE — ASSESSMENT & PLAN NOTE
Chronic and stable condition  Patient had a sleep study completed on 3/25/2021 which showed obstructive sleep apnea  She was post to be getting set up with a CPAP however is unsure if she has completed this yet  She does currently use oxygen at night

## 2022-01-12 NOTE — ASSESSMENT & PLAN NOTE
Chronic and stable condition  Currently taking 20 mg atorvastatin daily tolerating  Continue with statin for treatment and secondary prevention for ASCVD

## 2022-01-12 NOTE — ASSESSMENT & PLAN NOTE
Chronic stable condition   Was following with Dr. Monroy, cardiology-requesting a referral   current weight is 110lb  Currently takes Lasix 20 mg 1 times daily as needed, Lopressor, lisinopril 20 mg twice daily  She was taking potassium 20 mEq daily as needed  Recent echo showed a LVEF 40-45 % with a grade 3 diastolic dysfunction which is an improvement from the previous echo on 10/2019  Discussed no salt in diet  Continue to log weight, weigh yourself daily  Denies chest pain, orthopnea, bilateral edema, PND

## 2022-01-25 NOTE — TELEPHONE ENCOUNTER
Patient's son came in office. Is wondering if Dr. Solano could send a prescription for Insulin Lantus Solostar 100 UNIT to [facility]. Patient usually injects 15 units under skin once a day. Last visit with Dr. Solano was 1/4/22. He is wondering if Russ could do it since she is establish and they don't see the old doctor anymore. Thank you

## 2022-01-26 NOTE — TELEPHONE ENCOUNTER
Left vm message to schedule DM visit in CC (thursdays)  Soco Burns, Clinical Pharmacist, CDE, CACP

## 2022-01-27 NOTE — CASE COMMUNICATION
Quality Review for 1/21/22 [place] OASIS by ZANA Jean Baptiste, DANIKA on  January 26, 2022      Edits completed by ZANA Jean Baptiste RN:  1.  E is NA per the care plan  2.  is NA, there is no documentation of a clinically significant medication issue identified None

## 2022-01-31 NOTE — CASE COMMUNICATION
I agree with these changes. Monserrat Ley  ----- Message -----  From: Susie Jean Baptiste R.N.  Sent: 1/26/2022   4:38 PM PST  To: Monserrat Ley OT      Quality Review for 1/21/22 [place] OASIS by ZANA Jean Baptiste, RN on  January 26, 2022      Edits completed by ZANA Jean Baptiste RN:  1.  E is NA per the care plan  2.  is NA, there is no documentation of a clinically significant medication issue identified

## 2022-02-03 NOTE — NON-PROVIDER
"    Patient Consult Note    TIME IN: 3:09  TIME OUT: 3:52    Primary care physician: MIGUEL ANGEL Tanner    Reason for consult: Management of Controlled Type 2 Diabetes    HPI:  Sinai Huang is a 86 y.o. old patient who comes in today for evaluation of above stated problem.    Most Recent HbA1c:   Lab Results   Component Value Date/Time    HBA1C 7.4 (A) 02/03/2022 03:54 PM      Lab Results   Component Value Date/Time    CREATININE 0.84 04/12/2021 08:45 AM        Recent Labs     02/03/22  1604   POCGLUCOSE 158*       Diabetes Medication History and Current Regimen  Metformin: 1000mg po bid       Basal Insulin: lantus 10-17 units daily based on \"how she feels\"      Pt has home glucometer and proper testing technique - yes    Pt reports blood sugars:       Hypoglycemia awareness - yes  Nocturnal hypoglycemia- none  Hypoglycemia:  None    Pt's treatment of Hypoglycemia - juice  - 15:15 Rule    Current Exercise - none  Exercise Goal - pt reports walking \"around her big house\" with her front wheeled walker    Dietary - common adult diet, including JUICE every morning, dried fruit, potato chips, baked potatoes etc    Pt reports eating:  Breakfast - fruit and juice  Snack - dried dates  Lunch - sandwich  Snack -      Dinner - protein, starch, vegetable  Snack - chocolate    Foot Exam:  Monofilament exam - will address at next visit  Monofilament testing with a 10 gram force: sensation intact: decreased bilaterally.    Visual Inspection: Feet without maceration, ulcers, fissures.  Feet dry.  Pedal pulses: intact bilaterally    Preventative Management  BP regimen (ACE/ARB) - lisinopril 20 daily  ASA - 81mg po daily  Statin - atorva 20 daily  Last Retinal Scan - current  Last Foot Exam - will address at next visit  Last A1c -   Lab Results   Component Value Date/Time    HBA1C 7.4 (A) 02/03/2022 03:54 PM      Last Microalbuminuria - ordered     updated caregaps    Past Medical History:  Patient Active Problem List "    Diagnosis Date Noted   • Obstructive sleep apnea 01/12/2022   • Chronic systolic heart failure (HCC) 11/22/2021   • Closed fracture of rib of right side 11/22/2021   • Dependence on supplemental oxygen 11/22/2021   • Hx of CABG 11/22/2021   • Pulmonary hypertension (HCC) 11/22/2021   • Sciatica 11/22/2021   • Type 2 diabetes mellitus with hyperglycemia ([facility]) 11/22/2021   • Right shoulder pain 11/22/2021   • Hyperkalemia 11/22/2021   • Hyponatremia 11/22/2021   • Syncope and collapse 11/20/2021   • History of breast cancer 03/26/2021   • Recurrent falls 03/26/2021   • CAD (coronary artery disease) 11/25/2020   • Chronic pain disorder 11/25/2020   • Heart failure with reduced ejection fraction, NYHA class III ([facility]) 11/25/2020   • Hyperlipidemia 11/25/2020   • Nonrheumatic mitral valve regurgitation 11/25/2020   • Osteopenia of multiple sites 11/25/2020   • Nonrheumatic aortic valve stenosis 11/25/2020   • Multinodular goiter 11/25/2020   • Ischemic cardiomyopathy 11/25/2020   • Primary osteoarthritis involving multiple joints 11/25/2020   • Right bundle branch block (RBBB) 11/25/2020   • Hypertension 11/20/2020       Past Surgical History:  No past surgical history on file.    Allergies:  Patient has no known allergies.    Social History:  Social History     Socioeconomic History   • Marital status:      Spouse name: Not on file   • Number of children: Not on file   • Years of education: Not on file   • Highest education level: Not on file   Occupational History   • Not on file   Tobacco Use   • Smoking status: Never Smoker   • Smokeless tobacco: Never Used   Vaping Use   • Vaping Use: Never used   Substance and Sexual Activity   • Alcohol use: Never   • Drug use: Never   • Sexual activity: Not Currently   Other Topics Concern   • Not on file   Social History Narrative   • Not on file     Social Determinants of Health     Financial Resource Strain:    • Difficulty of Paying Living Expenses: Not on file    Food Insecurity:    • Worried About Running Out of Food in the Last Year: Not on file   • Ran Out of Food in the Last Year: Not on file   Transportation Needs:    • Lack of Transportation (Medical): Not on file   • Lack of Transportation (Non-Medical): Not on file   Physical Activity:    • Days of Exercise per Week: Not on file   • Minutes of Exercise per Session: Not on file   Stress:    • Feeling of Stress : Not on file   Social Connections:    • Frequency of Communication with Friends and Family: Not on file   • Frequency of Social Gatherings with Friends and Family: Not on file   • Attends [employer] Services: Not on file   • Active Member of Clubs or Organizations: Not on file   • Attends Club or Organization Meetings: Not on file   • Marital Status: Not on file   Intimate Partner Violence:    • Fear of Current or Ex-Partner: Not on file   • Emotionally Abused: Not on file   • Physically Abused: Not on file   • Sexually Abused: Not on file   Housing Stability:    • Unable to Pay for Housing in the Last Year: Not on file   • Number of Places Lived in the Last Year: Not on file   • Unstable Housing in the Last Year: Not on file       Family History:  No family history on file.    Medications:    Current Outpatient Medications:   •  Empagliflozin (JARDIANCE) 25 MG Tab, Take 1 Tablet by mouth every day., Disp: , Rfl:   •  lidocaine (LIDODERM) 5 % Patch, , Disp: , Rfl:   •  albuterol 108 (90 Base) MCG/ACT Aero Soln inhalation aerosol, Inhale 2 Puffs every 6 hours as needed., Disp: , Rfl:   •  cyclobenzaprine (FLEXERIL) 5 mg tablet, Take 1 Tablet by mouth 1 time a day as needed., Disp: 15 Tablet, Rfl: 0  •  metoprolol tartrate (LOPRESSOR) 25 MG Tab, Take 25 mg by mouth 2 times a day., Disp: , Rfl:   •  Multiple Vitamins-Minerals (MULTIVITAMIN WOMEN) Tab, Take 1 Tablet by mouth every day., Disp: , Rfl:   •  Home Care Oxygen, Inhale 1 L/min as needed for Shortness of Breath (shortness of breath). patient has own  concentrator Oxygen dose range: 1 L/min Respiratory route via: Nasal Cannula  Oxygen supplier: [employer]  , Disp: , Rfl:   •  Acetaminophen 500 MG Cap, Take 1,000 mg by mouth every 6 hours as needed (pain)., Disp: , Rfl:   •  nitroglycerin (NITROSTAT) 0.4 MG SL Tab, Place 0.4 mg under the tongue as needed for Chest Pain., Disp: , Rfl:   •  gabapentin (NEURONTIN) 100 MG Cap, Take 200 mg by mouth at bedtime. Indications: Neuropathic Pain, Disp: , Rfl:   •  furosemide (LASIX) 20 MG Tab, Take 20 mg by mouth 1 time a day as needed (edema, wt gain of 2 pounds or more overnight ). Indications: Edema, Disp: , Rfl:   •  potassium chloride SA (K-DUR) 10 MEQ Tab CR, Take 20 mEq by mouth 1 time a day as needed (supplement). Indications: Low Amount of Potassium in the Blood, take with lasix, Disp: , Rfl:   •  BABY ASPIRIN PO, Take 81 mg by mouth 2 times a day. change in dosage from hospital  Indications: cardiac, Disp: , Rfl:   •  EPINEPHrine (PRIMATENE MIST INH), Inhale 1 Inhalation 1 time a day as needed (sob). Indications: sob, Disp: , Rfl:   •  atorvastatin (LIPITOR) 20 MG Tab, Take 20 mg by mouth every evening., Disp: , Rfl:   •  lisinopril (PRINIVIL) 20 MG Tab, Take 20 mg by mouth 2 times a day. Take 1 tablet by mouth twice a day, Disp: , Rfl:   •  metformin (GLUCOPHAGE) 1000 MG tablet, Take 1,000 mg by mouth 2 times a day, with meals., Disp: , Rfl:   •  PX Lancets Ultra Thin 28G Misc, test 3 times a day (Patient not taking: Reported on 1/4/2022), Disp: , Rfl:   •  meclizine (ANTIVERT) 12.5 MG Tab, Take 12.5 mg by mouth 1 time a day as needed for Dizziness., Disp: , Rfl:   •  Pyridoxine HCl (VITAMIN B6) 100 MG Tab, Take 100 mg by mouth every day., Disp: , Rfl:   •  ranolazine (RANEXA) 500 MG TABLET SR 12 HR, Take 500 mg by mouth 2 times a day., Disp: , Rfl:   •  Spacer/Aero-Holding Chambers Device, 1 Device by Does not apply route as needed. (Patient taking differently: 1 Device as needed (for use with inhaler).),  Disp: 1 Device, Rfl: 0    Labs: Reviewed    Physical Examination:  Vital signs: There were no vitals taken for this visit. There is no height or weight on file to calculate BMI.    Assessment and Plan:    1. DM2  • Pt is currently using Lantus 10-17 units daily based on how she feels  • Will DC INSULIN at this time  • Pt is currently optimized on metformin 1000mg po bid  • Will begin Jardiance 25mg po daily.  BP is elevated 160systolic today, therefore we will not reduce furosemide  • A1c is 7.4 down from 8.4 PT IS AT GOAL for age    - Medication changes:  • Dc insulin  • Start Jardiance 25mg po daily samples given  •      - Lifestyle changes:  • NO JUICE, more fresh fruits  • Reduce cheese, deli meats, bread, chips etc for improvement of CHF symptoms  •     Return in about 4 weeks (around 3/3/2022).    Soco Burns  02/03/22    CC:   MIGUEL ANGEL Tanner

## 2022-02-03 NOTE — PATIENT INSTRUCTIONS
1. NO JUICE  2. Check Blood sugar at night and if <150 NO INSULIN if >170 5 units  3. Start Jardiance 25mg one pill daily in the morning  4 Continue all other medicines daily

## 2022-02-07 NOTE — TELEPHONE ENCOUNTER
----- Message from MIGUEL ANGEL Tanner sent at 1/26/2022  5:47 PM PST -----  Regarding: new labs and follow up appt  Can you call them and talk to her son Harpal and notify him that after I reviewed her labs and notes I placed new lab order that I would like for them to get completed sooner than later and then schedule a follow up to review them.     I have printed them out and they are on your desk, pleas see if he can pick them up or if he wants up to send them snail mail.     thanks

## 2022-03-02 PROBLEM — R29.6 RECURRENT FALLS: Status: RESOLVED | Noted: 2021-03-26 | Resolved: 2022-01-01

## 2022-03-02 PROBLEM — E87.1 HYPONATREMIA: Status: RESOLVED | Noted: 2021-01-01 | Resolved: 2022-01-01

## 2022-03-02 PROBLEM — S22.31XA CLOSED FRACTURE OF RIB OF RIGHT SIDE: Status: RESOLVED | Noted: 2021-01-01 | Resolved: 2022-01-01

## 2022-03-02 PROBLEM — E87.5 HYPERKALEMIA: Status: RESOLVED | Noted: 2021-01-01 | Resolved: 2022-01-01

## 2022-03-02 PROBLEM — M25.511 RIGHT SHOULDER PAIN: Status: RESOLVED | Noted: 2021-01-01 | Resolved: 2022-01-01

## 2022-03-02 PROBLEM — R55 SYNCOPE AND COLLAPSE: Status: RESOLVED | Noted: 2021-11-20 | Resolved: 2022-01-01

## 2022-03-02 NOTE — ASSESSMENT & PLAN NOTE
Chronic and stable condition   Uses home oxygen at night  Echo  11/20/2021 shows right systolic function to be severely reduced

## 2022-03-02 NOTE — PROGRESS NOTES
CC:  Chief Complaint   Patient presents with   • Follow-Up   • Lab Results       HISTORY OF PRESENT ILLNESS: Patient is a 86 y.o. female established patient presenting follow up labs      Pulmonary hypertension (HCC)  Chronic and stable condition   Uses home oxygen at night  Echo  11/20/2021 shows right systolic function to be severely reduced    Obstructive sleep apnea  Chronic and stable condition   Does not like the CPAP, uses night time oxygen     Type 2 diabetes mellitus with hyperglycemia (HCC)  Follow up with kianna filter    Sciatica  Follows with Dr. Best 3/17/2022 for sciatita    Right bundle branch block (RBBB)  Pending appt with Dr. sheldon    Osteopenia of multiple sites  Using 4 wheel walker today    Nonrheumatic aortic valve stenosis  Follow up with cardiolog    Nonrheumatic mitral valve regurgitation  Follow up with cardiology    Ischemic cardiomyopathy  Follow up with cardiology    Hypertension  Chronic and stable condition   Office /64  Denies change in vision , headaches, lightheadedness    Hyperlipidemia  atrovastatin   Labs are controlled    Chronic systolic heart failure (HCC)  Ejection fraction 40-45%  Follows with cardiology dr crawford      Patient Active Problem List    Diagnosis Date Noted   • Obstructive sleep apnea 01/12/2022   • Chronic systolic heart failure (HCC) 11/22/2021   • Dependence on supplemental oxygen 11/22/2021   • Hx of CABG 11/22/2021   • Pulmonary hypertension (HCC) 11/22/2021   • Sciatica 11/22/2021   • Type 2 diabetes mellitus with hyperglycemia (HCC) 11/22/2021   • History of breast cancer 03/26/2021   • CAD (coronary artery disease) 11/25/2020   • Chronic pain disorder 11/25/2020   • Heart failure with reduced ejection fraction, NYHA class III (HCC) 11/25/2020   • Hyperlipidemia 11/25/2020   • Nonrheumatic mitral valve regurgitation 11/25/2020   • Osteopenia of multiple sites 11/25/2020   • Nonrheumatic aortic valve stenosis 11/25/2020   • Multinodular goiter  11/25/2020   • Ischemic cardiomyopathy 11/25/2020   • Primary osteoarthritis involving multiple joints 11/25/2020   • Right bundle branch block (RBBB) 11/25/2020   • Hypertension 11/20/2020      Allergies:Patient has no known allergies.    Current Outpatient Medications   Medication Sig Dispense Refill   • VITAMIN D PO Take  by mouth.     • Calcium Carbonate (CALCIUM 500 PO) Take  by mouth.     • Empagliflozin (JARDIANCE) 25 MG Tab Take 1 Tablet by mouth every day.     • lidocaine (LIDODERM) 5 % Patch      • albuterol 108 (90 Base) MCG/ACT Aero Soln inhalation aerosol Inhale 2 Puffs every 6 hours as needed.     • cyclobenzaprine (FLEXERIL) 5 mg tablet Take 1 Tablet by mouth 1 time a day as needed. 15 Tablet 0   • metoprolol tartrate (LOPRESSOR) 25 MG Tab Take 25 mg by mouth 2 times a day.     • meclizine (ANTIVERT) 12.5 MG Tab Take 12.5 mg by mouth 1 time a day as needed for Dizziness.     • Pyridoxine HCl (VITAMIN B6) 100 MG Tab Take 100 mg by mouth every day.     • Multiple Vitamins-Minerals (MULTIVITAMIN WOMEN) Tab Take 1 Tablet by mouth every day.     • Home Care Oxygen Inhale 1 L/min as needed for Shortness of Breath (shortness of breath). patient has own concentrator  Oxygen dose range: 1 L/min  Respiratory route via: Nasal Cannula   Oxygen supplier: [employer]         • Acetaminophen 500 MG Cap Take 1,000 mg by mouth every 6 hours as needed (pain).     • ranolazine (RANEXA) 500 MG TABLET SR 12 HR Take 500 mg by mouth 2 times a day.     • nitroglycerin (NITROSTAT) 0.4 MG SL Tab Place 0.4 mg under the tongue as needed for Chest Pain.     • gabapentin (NEURONTIN) 100 MG Cap Take 200 mg by mouth at bedtime. Indications: Neuropathic Pain     • furosemide (LASIX) 20 MG Tab Take 20 mg by mouth 1 time a day as needed (edema, wt gain of 2 pounds or more overnight ). Indications: Edema     • potassium chloride SA (K-DUR) 10 MEQ Tab CR Take 20 mEq by mouth 1 time a day as needed (supplement). Indications: Low  Amount of Potassium in the Blood, take with lasix     • BABY ASPIRIN PO Take 81 mg by mouth 2 times a day. change in dosage from hospital  Indications: cardiac     • EPINEPHrine (PRIMATENE MIST INH) Inhale 1 Inhalation 1 time a day as needed (sob). Indications: sob     • atorvastatin (LIPITOR) 20 MG Tab Take 20 mg by mouth every evening.     • lisinopril (PRINIVIL) 20 MG Tab Take 20 mg by mouth 2 times a day. Take 1 tablet by mouth twice a day     • metformin (GLUCOPHAGE) 1000 MG tablet Take 1,000 mg by mouth 2 times a day, with meals.     • Spacer/Aero-Holding Chambers Device 1 Device by Does not apply route as needed. (Patient taking differently: 1 Device as needed (for use with inhaler).) 1 Device 0   • PX Lancets Ultra Thin 28G Misc test 3 times a day (Patient not taking: No sig reported)       No current facility-administered medications for this visit.       Social History     Tobacco Use   • Smoking status: Never Smoker   • Smokeless tobacco: Never Used   Vaping Use   • Vaping Use: Never used   Substance Use Topics   • Alcohol use: Never   • Drug use: Never     Social History     Social History Narrative   • Not on file       History reviewed. No pertinent family history.     Review of Systems   Constitutional: Negative for chills, fever, malaise/fatigue and weight loss.   HENT: Positive for hearing loss. Negative for congestion.    Eyes: Negative.    Respiratory: Negative for cough, shortness of breath and wheezing.    Cardiovascular: Negative for chest pain, palpitations and leg swelling.   Gastrointestinal: Negative for abdominal pain, constipation, diarrhea, nausea and vomiting.   Genitourinary: Negative for dysuria and hematuria.   Musculoskeletal: Negative.    Skin: Negative.    Neurological: Negative for dizziness and headaches.   Psychiatric/Behavioral: Negative.              - NOTE: All other systems reviewed and are negative, except as in HPI.      Exam:    /64   Pulse 86   Temp 37 °C (98.6  °F) (Temporal)   Resp 18   Ht 1.524 m (5')   Wt 52.6 kg (116 lb)   SpO2 97%  Body mass index is 22.65 kg/m².    General: Alert, pleasant, NAD  EYES:   PERRL, EOMI, no icterus or pallor.  Conjunctivae and lids normal.   HENT:  Normocephalic.  External ears normal.   Heart: Regular rate and rhythm.  S1 and S2 normal.  No murmurs appreciated.  Respiratory: Normal respiratory effort.  Clear to auscultation bilaterally.  Skin: Warm, dry, no rashes.  Musculoskeletal: Gait is normal.  Moves all extremities well.    Extremities: No clubbing, cyanosis or edema noted.   Neurological: No tremors, sensation grossly intact, tone/strength normal, gait is normal, CN2-12 intact.  Psych:  Affect/mood is normal, judgement is good, memory is intact, grooming is appropriate.      LABS: 2/24/2022: Results reviewed and discussed with the patient, questions answered.    Assessment/Plan:  1. Pulmonary hypertension (HCC)  - proBrain Natriuretic Peptide, NT; Future    2. Obstructive sleep apnea  Continue with home oxygen at night    3. Type 2 diabetes mellitus with hyperglycemia, with long-term current use of insulin (HCC)  - Referral for Retinal Screening Exam; Future  - Diabetic Monofilament LE Exam  Monofilament testing with a 10 gram force: sensation intact: intact bilaterally  Visual Inspection: Feet without maceration, ulcers, fissures.  Pedal pulses: intact bilaterally  Continue with kianna Filter- next appt 3/3/2022    4. Sciatica of right side    5. Osteopenia of multiple sites  New supplements for vitamin d and calcium- otc   Please continue vitamin D supplementation at 3164-9095 units per day  I recommend low-dose calcium supplementation at 400-600 mg per day.   Eating at least 2 servings of calcium rich food a day such as yogurt or cottage cheese is recommended.    Weightbearing exercise such as walking or light weight training is helpful as well.    6. Right bundle branch block (RBBB)  7. Nonrheumatic aortic valve  stenosis  8. Nonrheumatic mitral valve regurgitation  9. Ischemic cardiomyopathy  Has new patient appointment 3/9/2022 with Dr. Proctor  Continue with metoprolol, furosemide, potassium, lisinopril    10. Hypertension, unspecified type  Continue with lisinopril    11. Mixed hyperlipidemia  Continue with atorvastatin    12. Chronic systolic heart failure (HCC)  - proBrain Natriuretic Peptide, NT; Future    13. Chronic kidney disease (CKD) stage G3b/A3, moderately decreased glomerular filtration rate (GFR) between 30-44 mL/min/1.73 square meter and albuminuria creatinine ratio greater than 300 mg/g (HCC)  - Basic Metabolic Panel; Future  - proBrain Natriuretic Peptide, NT; Future     Discussed with patient possible alternative diagnoses, patient is to take all medications as prescribed.     If symptoms persist FU w/PCP, if symptoms worsen go to emergency room.     If experiencing any side effects from prescribed medications reports to the office immediately or go to emergency room.    Reviewed indication, dosage, usage and potential adverse effects of prescribed medications.     Reviewed risks and benefits of treatment plan. Patient verbalizes understanding of all instruction and verbally agrees to plan.      Return in about 3 months (around 6/2/2022) for follow up exam.    My total time spent caring for the patient on the day of the encounter was 40 minutes.   This does not include time spent on separately billable procedures/tests.     Please note that this dictation was created using voice recognition software. I have made every reasonable attempt to correct obvious errors, but I expect that there are errors of grammar and possibly content that I did not discover before finalizing the note.

## 2022-03-03 NOTE — NON-PROVIDER
Patient Consult Note    TIME IN: 2:58  TIME OUT: 3:20    Primary care physician: MIGUEL ANGEL Tanner    Reason for consult: Management of Uncontrolled Type 2 Diabetes    HPI:  Sinai Huang is a 86 y.o. old patient who comes in today for evaluation of above stated problem.    Most Recent HbA1c:   Lab Results   Component Value Date/Time    HBA1C 7.4 (A) 02/03/2022 03:54 PM      Lab Results   Component Value Date/Time    CREATININE 0.84 04/12/2021 08:45 AM              Diabetes Medication History and Current Regimen  Metformin: metformin 1000mg po bid     SGLT-2 Inhibitor:  Empagliflozin 25 mg once daily       Basal Insulin: 5-10 units at night    Pt has home glucometer and proper testing technique - yes    Pt reports blood sugars:     Other times: 109-180    Hypoglycemia awareness - yes  Nocturnal hypoglycemia- none  Hypoglycemia:  None    Pt's treatment of Hypoglycemia - n/a  - 15:15 Rule    Current Exercise - none  Exercise Goal - pt would benefit from daily dedicated walking 30 minutes daily    Dietary - low carbohydrate        Foot Exam: current  Monofilament exam - current  Monofilament testing with a 10 gram force: sensation intact: decreased bilaterally.    Visual Inspection: Feet without maceration, ulcers, fissures.  Feet dry.  Pedal pulses: intact bilaterally    Preventative Management  BP regimen (ACE/ARB) - lisinopril 20mg po daily  ASA - 81mg daily  Statin - atorvastatin 20 daily  Last Retinal Scan -    Last Foot Exam - current  Last A1c -   Lab Results   Component Value Date/Time    HBA1C 7.4 (A) 02/03/2022 03:54 PM      Last Microalbuminuria - current     updated caregaps    Past Medical History:  Patient Active Problem List    Diagnosis Date Noted   • Obstructive sleep apnea 01/12/2022   • Chronic systolic heart failure (HCC) 11/22/2021   • Dependence on supplemental oxygen 11/22/2021   • Hx of CABG 11/22/2021   • Pulmonary hypertension (HCC) 11/22/2021   • Sciatica 11/22/2021   • Type 2  diabetes mellitus with hyperglycemia ([facility]) 11/22/2021   • History of breast cancer 03/26/2021   • CAD (coronary artery disease) 11/25/2020   • Chronic pain disorder 11/25/2020   • Heart failure with reduced ejection fraction, NYHA class III ([facility]) 11/25/2020   • Hyperlipidemia 11/25/2020   • Nonrheumatic mitral valve regurgitation 11/25/2020   • Osteopenia of multiple sites 11/25/2020   • Nonrheumatic aortic valve stenosis 11/25/2020   • Multinodular goiter 11/25/2020   • Ischemic cardiomyopathy 11/25/2020   • Primary osteoarthritis involving multiple joints 11/25/2020   • Right bundle branch block (RBBB) 11/25/2020   • Hypertension 11/20/2020       Past Surgical History:  No past surgical history on file.    Allergies:  Patient has no known allergies.    Social History:  Social History     Socioeconomic History   • Marital status:      Spouse name: Not on file   • Number of children: Not on file   • Years of education: Not on file   • Highest education level: Not on file   Occupational History   • Not on file   Tobacco Use   • Smoking status: Never Smoker   • Smokeless tobacco: Never Used   Vaping Use   • Vaping Use: Never used   Substance and Sexual Activity   • Alcohol use: Never   • Drug use: Never   • Sexual activity: Not Currently   Other Topics Concern   • Not on file   Social History Narrative   • Not on file     Social Determinants of Health     Financial Resource Strain: Not on file   Food Insecurity: Not on file   Transportation Needs: Not on file   Physical Activity: Not on file   Stress: Not on file   Social Connections: Not on file   Intimate Partner Violence: Not on file   Housing Stability: Not on file       Family History:  No family history on file.    Medications:    Current Outpatient Medications:   •  Dulaglutide (TRULICITY) 0.75 MG/0.5ML Solution Pen-injector, Inject 1 Each under the skin every 7 days., Disp: , Rfl:   •  VITAMIN D PO, Take  by mouth., Disp: , Rfl:   •  Calcium Carbonate  (CALCIUM 500 PO), Take  by mouth., Disp: , Rfl:   •  Empagliflozin (JARDIANCE) 25 MG Tab, Take 1 Tablet by mouth every day., Disp: , Rfl:   •  PX Lancets Ultra Thin 28G Misc, test 3 times a day, Disp: , Rfl:   •  lidocaine (LIDODERM) 5 % Patch, , Disp: , Rfl:   •  albuterol 108 (90 Base) MCG/ACT Aero Soln inhalation aerosol, Inhale 2 Puffs every 6 hours as needed., Disp: , Rfl:   •  cyclobenzaprine (FLEXERIL) 5 mg tablet, Take 1 Tablet by mouth 1 time a day as needed., Disp: 15 Tablet, Rfl: 0  •  metoprolol tartrate (LOPRESSOR) 25 MG Tab, Take 25 mg by mouth 2 times a day., Disp: , Rfl:   •  meclizine (ANTIVERT) 12.5 MG Tab, Take 12.5 mg by mouth 1 time a day as needed for Dizziness., Disp: , Rfl:   •  Pyridoxine HCl (VITAMIN B6) 100 MG Tab, Take 100 mg by mouth every day., Disp: , Rfl:   •  Multiple Vitamins-Minerals (MULTIVITAMIN WOMEN) Tab, Take 1 Tablet by mouth every day., Disp: , Rfl:   •  Home Care Oxygen, Inhale 1 L/min as needed for Shortness of Breath (shortness of breath). patient has own concentrator Oxygen dose range: 1 L/min Respiratory route via: Nasal Cannula  Oxygen supplier: [employer]  , Disp: , Rfl:   •  Acetaminophen 500 MG Cap, Take 1,000 mg by mouth every 6 hours as needed (pain)., Disp: , Rfl:   •  ranolazine (RANEXA) 500 MG TABLET SR 12 HR, Take 500 mg by mouth 2 times a day., Disp: , Rfl:   •  nitroglycerin (NITROSTAT) 0.4 MG SL Tab, Place 0.4 mg under the tongue as needed for Chest Pain., Disp: , Rfl:   •  gabapentin (NEURONTIN) 100 MG Cap, Take 200 mg by mouth at bedtime. Indications: Neuropathic Pain, Disp: , Rfl:   •  furosemide (LASIX) 20 MG Tab, Take 20 mg by mouth 1 time a day as needed (edema, wt gain of 2 pounds or more overnight ). Indications: Edema, Disp: , Rfl:   •  potassium chloride SA (K-DUR) 10 MEQ Tab CR, Take 20 mEq by mouth 1 time a day as needed (supplement). Indications: Low Amount of Potassium in the Blood, take with lasix, Disp: , Rfl:   •  BABY ASPIRIN PO,  Take 81 mg by mouth 2 times a day. change in dosage from hospital  Indications: cardiac, Disp: , Rfl:   •  EPINEPHrine (PRIMATENE MIST INH), Inhale 1 Inhalation 1 time a day as needed (sob). Indications: sob, Disp: , Rfl:   •  atorvastatin (LIPITOR) 20 MG Tab, Take 20 mg by mouth every evening., Disp: , Rfl:   •  lisinopril (PRINIVIL) 20 MG Tab, Take 20 mg by mouth 2 times a day. Take 1 tablet by mouth twice a day, Disp: , Rfl:   •  metformin (GLUCOPHAGE) 1000 MG tablet, Take 1,000 mg by mouth 2 times a day, with meals., Disp: , Rfl:   •  Spacer/Aero-Holding Chambers Device, 1 Device by Does not apply route as needed. (Patient taking differently: 1 Device as needed (for use with inhaler).), Disp: 1 Device, Rfl: 0    Labs: Reviewed    Physical Examination:  Vital signs: /52   Pulse 78   Wt 53.1 kg (117 lb)   BMI 22.85 kg/m²  Body mass index is 22.85 kg/m².    Assessment and Plan:    1. DM2  • Pt is tolerating Jardiance 25mg po daily, will submit PAP today  • Pt is currently optimized on metformin 1000mg po bid  • Will begin Trulicity 0.75mg sub q weekly and will optimize as tolerated  • DC insulin  •     - Medication changes:  • Trulicity 0.75mg sub q weekly  • DC insulin     - Lifestyle changes:  • Continue reduced simple carbohydrate diet  • Pt to continue NO Juice  • Pt to increase intensity and duration of exercise  •     Return in about 3 weeks (around 3/24/2022).    Soco Burns  03/03/22    CC:   MIGUEL ANGEL Tanner

## 2022-03-08 NOTE — TELEPHONE ENCOUNTER
Spoke to pt in regards to obtaining records for NP appointment with [facility]. Per patient has been treated by a previous cardiologist, patient unsure of the name. Records in chart from [facility] from 2021. Patient stated she was not treated by a previous cardiologist prior to then. Confirmed all other recent notes, labs, and cardiac imaging are in Epic. Confirmed with patient appointment date, time, and location.

## 2022-03-09 NOTE — LETTER
[facility]   [address]  [place] [address]  Phone: [phone]  Fax: [phone]              Sinai Huang  1935    Encounter Date: 3/9/2022    Lisa Proctor M.D.          PROGRESS NOTE:  Subjective:   Chief Complaint:   Chief Complaint   Patient presents with   • Hypertension     NP   • Cardiomyopathy (Ischemic)   • Coronary Artery Disease       Sinai Huang is a 86 y.o. female who is referred by Ginna Solano for CAD/CABG, ischemic cardiomyopathy, chronic systolic heart failure, syncope and collapse, right bundle branch block, bradycardia,    Has CAD, CABG in [id number] at [facility] in [place].  EF 20 to 25% [facility] by echo November 2021.  Nuclear stress test [facility] October 2021 with mild fixed inferior wall defect, no ischemia.  EF calculated at 32%.  Remains on aspirin, statin, beta-blocker.  Previous cardiologist thought she was having costochondritis per his note 6/7/2021 by Dr. Zimmerman.    Echocardiogram limited at [facility] November 2021 with an EF of 40 to 45%.  Has been on beta-blocker, ACE inhibitor.    Has right bundle branch block, LAFB.    Has chronic HFrEF and cor pulmonale.  [place] heart class III, stage C.  Checking weight at home, gets up 111, typically 110, rarely 112 pounds    Severe secondary pulmonary hypertension, RVSP as high as 70 at [facility] October 2021.    Has hypertension, mostly controlled.  Not checking at home.    Has hyperlipidemia, on statin, moderate intensity.  LDL 67.    Has type 2 diabetes, on Trulicity, Jardiance, sees [facility], clinical pharm.  A1C in controlled.    Chronic oxygen but does not wear it all the time.    Prior breast cancer, right, chemo and radiation.    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     Patient was admitted to [facility] 11/22/2021, I was able to review the discharge diagnosis of the same date.  She was there was syncope and collapse and shoulder  pain.  Was treated for hypokalemia and hyponatremia.  Metoprolol dose was reduced because of bradycardia.  Follow-up blood work February 24, 2022, potassium and sodium had normalized.  Creatinine up slightly     Lives in [place].   to Thanh,  66 years.  Accompanied by her son, Harpal.    DATA REVIEWED by me:  ECG (my personal interpretation) 3/9/2022  Sinus, 87, RBBB, RVH/LVH, LAFB    Echo 11/20/2021 [facility]  Interpretation Summary   The Ejection Fraction estimate is 40-45%   The right ventricular systolic function is severely reduced.   The left atrial size is normal.   Limited echo     Echo 10/3/2021 [facility]  There is severe global hypokinesis of the left ventricle.   There is posterior wall severe hypokinesis   There is severe inferior wall hypokinesis.   The Ejection Fraction estimate is 20-25%   A variety of Doppler measurements indicate restrictive inflow patterns associated with grade III   diastolic dysfunction and increased left atrial pressures.   The right ventricle is moderate to severely dilated.   The right ventricular systolic function is severely reduced.   The left atrium is severely dilated.   The right atrium is moderately dilated.   There is severe mitral regurgitation   There is moderate tricuspid regurgitation   Right ventricular systolic pressure is elevated at 65-70 mmHg   Moderate to severe pulmonary hypertension is demonstrated.   There is mild to moderate pulmonic regurgitation   Mild pulmonary artery dilation.   Small right pleural effusion.     Echocardiogram [facility] 12/29/2020.  Interpretation Summary   There is mild concentric left ventricular hypertrophy. Inferior wall infarct noted. Left ventricular   systolic function is mildly reduced. The Ejection Fraction estimate is 40-45%   There is moderate mitral regurgitation   Right ventricular systolic pressure is elevated at 55-60 mmHg     Nuclear stress test 10/3/2021 [facility]  •  Left ventricular  perfusion is abnormal. Mild fixed defect in the   inferior wall c/w previous infarction no ischemia is noted   •  Abnormal LV function (32%). Hypokinetic inferior  and septal walls   •  No stress ECG evidence of ischemia.   •  No recovery ECG evidence of ischemia.      CT chest 10/3/2021  1.  No pulmonary emboli, thoracic aortic aneurysm or arterial dissection.   2.  Prior sternotomy.  Moderate cardiomegaly with pulmonary vascular congestion,   diffuse groundglass interstitial pattern throughout both lungs with enlarging   posterior layering pleural effusions consistent with a component of CHF.   Compressive atelectasis noted within the dependent portions of both lungs.   Concomitant pneumonia is not excluded.   3.  Suspect multinodular goiter.   3.  Mild left adrenal gland enlargement indeterminate between adenomatous   involvement or mild hyperplasia, unchanged.       CT head 11/20/2021  No acute abnormalities    Most recent labs:     2-24-22 sodium 136, potassium 4.9, LFTs normal, BUN 28, creatinine 1.38, total cholesterol 132, triglycerides 141, LDL 67, HDL 48 hemoglobin 13.7, platelets 165, TSH 0.51, free T4 0.87  Lab Results   Component Value Date/Time    WBC 5.5 04/12/2021 08:45 AM    HEMOGLOBIN 14.2 04/12/2021 08:45 AM    HEMATOCRIT 41.7 04/12/2021 08:45 AM    MCV 91.2 04/12/2021 08:45 AM      Lab Results   Component Value Date/Time    SODIUM 133 (L) 04/12/2021 08:45 AM    POTASSIUM 4.2 04/12/2021 08:45 AM    CHLORIDE 97 04/12/2021 08:45 AM    CO2 25 04/12/2021 08:45 AM    GLUCOSE 154 (H) 04/12/2021 08:45 AM    BUN 8 04/12/2021 08:45 AM    CREATININE 0.84 04/12/2021 08:45 AM      Lab Results   Component Value Date/Time    ASTSGOT 18 04/12/2021 08:45 AM    ALTSGPT 31 04/12/2021 08:45 AM    ALBUMIN 4.3 04/12/2021 08:45 AM      No results found for: CHOLSTRLTOT, LDL, HDL, TRIGLYCERIDE  No results for input(s): NTPROBNP, TROPONINT in the last 72 hours.      Past Medical History:   Diagnosis Date   • Closed  fracture of rib of right side 11/22/2021   • Recurrent falls 3/26/2021   • Type 2 diabetes mellitus without complication (HCC) 11/20/2020     History reviewed. No pertinent surgical history.  History reviewed. No pertinent family history.  Social History     Socioeconomic History   • Marital status:      Spouse name: Not on file   • Number of children: Not on file   • Years of education: Not on file   • Highest education level: Not on file   Occupational History   • Not on file   Tobacco Use   • Smoking status: Never Smoker   • Smokeless tobacco: Never Used   Vaping Use   • Vaping Use: Never used   Substance and Sexual Activity   • Alcohol use: Never   • Drug use: Never   • Sexual activity: Not Currently   Other Topics Concern   • Not on file   Social History Narrative   • Not on file     Social Determinants of Health     Financial Resource Strain: Not on file   Food Insecurity: Not on file   Transportation Needs: Not on file   Physical Activity: Not on file   Stress: Not on file   Social Connections: Not on file   Intimate Partner Violence: Not on file   Housing Stability: Not on file     No Known Allergies    Current Outpatient Medications   Medication Sig Dispense Refill   • Dulaglutide (TRULICITY) 0.75 MG/0.5ML Solution Pen-injector Inject 1 Each under the skin every 7 days.     • VITAMIN D PO Take  by mouth.     • Calcium Carbonate (CALCIUM 500 PO) Take  by mouth.     • Empagliflozin (JARDIANCE) 25 MG Tab Take 1 Tablet by mouth every day.     • PX Lancets Ultra Thin 28G Misc test 3 times a day     • lidocaine (LIDODERM) 5 % Patch      • albuterol 108 (90 Base) MCG/ACT Aero Soln inhalation aerosol Inhale 2 Puffs every 6 hours as needed.     • cyclobenzaprine (FLEXERIL) 5 mg tablet Take 1 Tablet by mouth 1 time a day as needed. 15 Tablet 0   • metoprolol tartrate (LOPRESSOR) 25 MG Tab Take 25 mg by mouth 2 times a day.     • meclizine (ANTIVERT) 12.5 MG Tab Take 12.5 mg by mouth 1 time a day as needed for  Dizziness.     • Pyridoxine HCl (VITAMIN B6) 100 MG Tab Take 100 mg by mouth every day.     • Multiple Vitamins-Minerals (MULTIVITAMIN WOMEN) Tab Take 1 Tablet by mouth every day.     • Home Care Oxygen Inhale 1 L/min as needed for Shortness of Breath (shortness of breath). patient has own concentrator  Oxygen dose range: 1 L/min  Respiratory route via: Nasal Cannula   Oxygen supplier: [employer]         • Acetaminophen 500 MG Cap Take 1,000 mg by mouth every 6 hours as needed (pain).     • ranolazine (RANEXA) 500 MG TABLET SR 12 HR Take 500 mg by mouth 2 times a day.     • nitroglycerin (NITROSTAT) 0.4 MG SL Tab Place 0.4 mg under the tongue as needed for Chest Pain.     • gabapentin (NEURONTIN) 100 MG Cap Take 200 mg by mouth at bedtime. Indications: Neuropathic Pain     • furosemide (LASIX) 20 MG Tab Take 20 mg by mouth 1 time a day as needed (edema, wt gain of 2 pounds or more overnight ). Indications: Edema     • potassium chloride SA (K-DUR) 10 MEQ Tab CR Take 20 mEq by mouth 1 time a day as needed (supplement). Indications: Low Amount of Potassium in the Blood, take with lasix     • BABY ASPIRIN PO Take 81 mg by mouth 2 times a day. change in dosage from hospital  Indications: cardiac     • EPINEPHrine (PRIMATENE MIST INH) Inhale 1 Inhalation 1 time a day as needed (sob). Indications: sob     • atorvastatin (LIPITOR) 20 MG Tab Take 20 mg by mouth every evening.     • lisinopril (PRINIVIL) 20 MG Tab Take 20 mg by mouth 2 times a day. Take 1 tablet by mouth twice a day     • metformin (GLUCOPHAGE) 1000 MG tablet Take 1,000 mg by mouth 2 times a day, with meals.     • Spacer/Aero-Holding Chambers Device 1 Device by Does not apply route as needed. (Patient taking differently: 1 Device as needed (for use with inhaler).) 1 Device 0     No current facility-administered medications for this visit.       ROS  All others systems reviewed and negative.     Objective:     /68 (BP Location: Left arm, Patient  Position: Sitting, BP Cuff Size: Adult)   Pulse 89   Resp 16   Ht 1.524 m (5')   Wt 51 kg (112 lb 7 oz)   SpO2 97%  Body mass index is 21.96 kg/m².    General: No acute distress. Well nourished.  HEENT: EOM grossly intact, no scleral icterus, no pharyngeal erythema.   Neck:  + JVD at 90, no bruits, trachea midline  CVS: RRR. Normal S1, S2. No khai M/R/G. No LE edema.  2+ radial pulses, 2+ PT pulses  Resp: CTAB. No wheezing or crackles/rhonchi. Normal respiratory effort.  Abdomen: Soft, NT, no khai hepatomegaly.  MSK/Ext: No clubbing or cyanosis.  Skin: Warm and dry, no rashes.  Neurological: CN III-XII grossly intact. No focal deficits.   Psych: A&O x 3, appropriate affect, good judgement        Assessment:     1. Coronary artery disease involving native coronary artery of native heart without angina pectoris     2. Hx of CABG     3. History of breast cancer     4. Hypertension, unspecified type  EKG   5. Pulmonary hypertension (HCC)     6. Type 2 diabetes mellitus with hyperglycemia, with long-term current use of insulin (HCC)     7. Ischemic cardiomyopathy     8. Syncope and collapse     9. Bradycardia     10. Right bundle branch block (RBBB) with left anterior fascicular block (LAFB)     11. RVH (right ventricular hypertrophy)     12. LVH (left ventricular hypertrophy)     13. Chronic HFrEF (heart failure with reduced ejection fraction) (HCC)  EC-ECHOCARDIOGRAM COMPLETE W/O CONT    Basic Metabolic Panel       Medical Decision Making:  Today's Assessment / Status / Plan:     -Has chronic HFrEF and cor pulmonale  -RVSP 70 in the hospital  -Not a candidate for targeted therapies  -Remain on furosemide 40 mg, low threshold to change to torsemide if any evidence of gut edema  -Continue blood pressure control  -Continue lower dose of metoprolol  -For CAD, secondary prevention aspirin, statin, beta-blocker  -For low EF, beta-blocker and ACE inhibitor, she is on metoprolol tartrate due to cost, low threshold to  change to carvedilol  -On good medications for type 2 diabetes, getting A1c down  -I do not want her to drink excessive water  -Continue to check her weights every day  -Repeat echocardiogram in about 2 months, okay to have it done at [facility] for convenience  -Close follow-up with EFREN in 6 weeks, MD 3 months  -Nonfasting BMP to follow-up on kidney function      Written instructions given today:    -Your water pill is very important to keep extra water out of the system so your blood is more concentrated and moves more efficiently through the heart and lungs.    -Your furosemide is also called Lasix which means 6 hours.  It is potent for 6 hours.  You can take it anytime of day, just do not take it after 3 PM.  We do not want it to interfere with sleep.    -Do not drink extra water, this is the opposite of your furosemide water pill.      -If your weight ever reaches 115 pounds, that would be an alarming sign that you are retaining fluid and you would need to contact us here at cardiology.    -To reach [facility] cardiology, you always want to call the mother ship in [place]: [phone].  The cardiologist is only in [place] on Wednesdays so you want to call [place] which is the main cardiology office.      Return in about 6 weeks (around 4/20/2022).    It is my pleasure to participate in the care of Ms. Huang.  Please do not hesitate to contact me with questions or concerns.    Lisa Proctor MD, [facility]  Cardiologist [facility]    Please note that this dictation was created using voice recognition software. I have made every reasonable attempt to correct obvious errors, but it is possible there are errors of grammar and possibly content that I did not discover before finalizing the note.          No Recipients

## 2022-03-09 NOTE — PROGRESS NOTES
Subjective:   Chief Complaint:   Chief Complaint   Patient presents with   • Hypertension     NP   • Cardiomyopathy (Ischemic)   • Coronary Artery Disease       Sinai Huang is a 86 y.o. female who is referred by Ginna Solano for CAD/CABG, ischemic cardiomyopathy, chronic systolic heart failure, syncope and collapse, right bundle branch block, bradycardia,    Has CAD, CABG in [id number] at [facility] in [place].  EF 20 to 25% [facility] by echo November 2021.  Nuclear stress test [facility] October 2021 with mild fixed inferior wall defect, no ischemia.  EF calculated at 32%.  Remains on aspirin, statin, beta-blocker.  Previous cardiologist thought she was having costochondritis per his note 6/7/2021 by Dr. Zimmerman.    Echocardiogram limited at [facility] November 2021 with an EF of 40 to 45%.  Has been on beta-blocker, ACE inhibitor.    Has right bundle branch block, LAFB.    Has chronic HFrEF and cor pulmonale.  [place] heart class III, stage C.  Checking weight at home, gets up 111, typically 110, rarely 112 pounds    Severe secondary pulmonary hypertension, RVSP as high as 70 at [facility] October 2021.    Has hypertension, mostly controlled.  Not checking at home.    Has hyperlipidemia, on statin, moderate intensity.  LDL 67.    Has type 2 diabetes, on Trulicity, Jardiance, juan pablo [employer], clinical pharm.  A1C in controlled.    Chronic oxygen but does not wear it all the time.    Prior breast cancer, right, chemo and radiation.    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     Patient was admitted to [facility] 11/22/2021, I was able to review the discharge diagnosis of the same date.  She was there was syncope and collapse and shoulder pain.  Was treated for hypokalemia and hyponatremia.  Metoprolol dose was reduced because of bradycardia.  Follow-up blood work February 24, 2022, potassium and sodium had normalized.  Creatinine up slightly     Lives in [place].   to Harlen,  66 years.  Accompanied by  her son, Harpal.    DATA REVIEWED by me:  ECG (my personal interpretation) 3/9/2022  Sinus, 87, RBBB, RVH/LVH, LAFB    Echo 11/20/2021 [facility]  Interpretation Summary   The Ejection Fraction estimate is 40-45%   The right ventricular systolic function is severely reduced.   The left atrial size is normal.   Limited echo     Echo 10/3/2021 [facility] V[facility]  There is severe global hypokinesis of the left ventricle.   There is posterior wall severe hypokinesis   There is severe inferior wall hypokinesis.   The Ejection Fraction estimate is 20-25%   A variety of Doppler measurements indicate restrictive inflow patterns associated with grade III   diastolic dysfunction and increased left atrial pressures.   The right ventricle is moderate to severely dilated.   The right ventricular systolic function is severely reduced.   The left atrium is severely dilated.   The right atrium is moderately dilated.   There is severe mitral regurgitation   There is moderate tricuspid regurgitation   Right ventricular systolic pressure is elevated at 65-70 mmHg   Moderate to severe pulmonary hypertension is demonstrated.   There is mild to moderate pulmonic regurgitation   Mild pulmonary artery dilation.   Small right pleural effusion.     Echocardiogram [facility] 12/29/2020.  Interpretation Summary   There is mild concentric left ventricular hypertrophy. Inferior wall infarct noted. Left ventricular   systolic function is mildly reduced. The Ejection Fraction estimate is 40-45%   There is moderate mitral regurgitation   Right ventricular systolic pressure is elevated at 55-60 mmHg     Nuclear stress test 10/3/2021 [facility]  •  Left ventricular perfusion is abnormal. Mild fixed defect in the   inferior wall c/w previous infarction no ischemia is noted   •  Abnormal LV function (32%). Hypokinetic inferior  and septal walls   •  No stress ECG evidence of ischemia.   •  No recovery ECG evidence of ischemia.      CT chest 10/3/2021  1.   No pulmonary emboli, thoracic aortic aneurysm or arterial dissection.   2.  Prior sternotomy.  Moderate cardiomegaly with pulmonary vascular congestion,   diffuse groundglass interstitial pattern throughout both lungs with enlarging   posterior layering pleural effusions consistent with a component of CHF.   Compressive atelectasis noted within the dependent portions of both lungs.   Concomitant pneumonia is not excluded.   3.  Suspect multinodular goiter.   3.  Mild left adrenal gland enlargement indeterminate between adenomatous   involvement or mild hyperplasia, unchanged.       CT head 11/20/2021  No acute abnormalities    Most recent labs:     2-24-22 sodium 136, potassium 4.9, LFTs normal, BUN 28, creatinine 1.38, total cholesterol 132, triglycerides 141, LDL 67, HDL 48 hemoglobin 13.7, platelets 165, TSH 0.51, free T4 0.87  Lab Results   Component Value Date/Time    WBC 5.5 04/12/2021 08:45 AM    HEMOGLOBIN 14.2 04/12/2021 08:45 AM    HEMATOCRIT 41.7 04/12/2021 08:45 AM    MCV 91.2 04/12/2021 08:45 AM      Lab Results   Component Value Date/Time    SODIUM 133 (L) 04/12/2021 08:45 AM    POTASSIUM 4.2 04/12/2021 08:45 AM    CHLORIDE 97 04/12/2021 08:45 AM    CO2 25 04/12/2021 08:45 AM    GLUCOSE 154 (H) 04/12/2021 08:45 AM    BUN 8 04/12/2021 08:45 AM    CREATININE 0.84 04/12/2021 08:45 AM      Lab Results   Component Value Date/Time    ASTSGOT 18 04/12/2021 08:45 AM    ALTSGPT 31 04/12/2021 08:45 AM    ALBUMIN 4.3 04/12/2021 08:45 AM      No results found for: CHOLSTRLTOT, LDL, HDL, TRIGLYCERIDE  No results for input(s): NTPROBNP, TROPONINT in the last 72 hours.      Past Medical History:   Diagnosis Date   • Closed fracture of rib of right side 11/22/2021   • Recurrent falls 3/26/2021   • Type 2 diabetes mellitus without complication (HCC) 11/20/2020     History reviewed. No pertinent surgical history.  History reviewed. No pertinent family history.  Social History     Socioeconomic History   • Marital  status:      Spouse name: Not on file   • Number of children: Not on file   • Years of education: Not on file   • Highest education level: Not on file   Occupational History   • Not on file   Tobacco Use   • Smoking status: Never Smoker   • Smokeless tobacco: Never Used   Vaping Use   • Vaping Use: Never used   Substance and Sexual Activity   • Alcohol use: Never   • Drug use: Never   • Sexual activity: Not Currently   Other Topics Concern   • Not on file   Social History Narrative   • Not on file     Social Determinants of Health     Financial Resource Strain: Not on file   Food Insecurity: Not on file   Transportation Needs: Not on file   Physical Activity: Not on file   Stress: Not on file   Social Connections: Not on file   Intimate Partner Violence: Not on file   Housing Stability: Not on file     No Known Allergies    Current Outpatient Medications   Medication Sig Dispense Refill   • Dulaglutide (TRULICITY) 0.75 MG/0.5ML Solution Pen-injector Inject 1 Each under the skin every 7 days.     • VITAMIN D PO Take  by mouth.     • Calcium Carbonate (CALCIUM 500 PO) Take  by mouth.     • Empagliflozin (JARDIANCE) 25 MG Tab Take 1 Tablet by mouth every day.     • PX Lancets Ultra Thin 28G Misc test 3 times a day     • lidocaine (LIDODERM) 5 % Patch      • albuterol 108 (90 Base) MCG/ACT Aero Soln inhalation aerosol Inhale 2 Puffs every 6 hours as needed.     • cyclobenzaprine (FLEXERIL) 5 mg tablet Take 1 Tablet by mouth 1 time a day as needed. 15 Tablet 0   • metoprolol tartrate (LOPRESSOR) 25 MG Tab Take 25 mg by mouth 2 times a day.     • meclizine (ANTIVERT) 12.5 MG Tab Take 12.5 mg by mouth 1 time a day as needed for Dizziness.     • Pyridoxine HCl (VITAMIN B6) 100 MG Tab Take 100 mg by mouth every day.     • Multiple Vitamins-Minerals (MULTIVITAMIN WOMEN) Tab Take 1 Tablet by mouth every day.     • Home Care Oxygen Inhale 1 L/min as needed for Shortness of Breath (shortness of breath). patient has own  concentrator  Oxygen dose range: 1 L/min  Respiratory route via: Nasal Cannula   Oxygen supplier: [employer]         • Acetaminophen 500 MG Cap Take 1,000 mg by mouth every 6 hours as needed (pain).     • ranolazine (RANEXA) 500 MG TABLET SR 12 HR Take 500 mg by mouth 2 times a day.     • nitroglycerin (NITROSTAT) 0.4 MG SL Tab Place 0.4 mg under the tongue as needed for Chest Pain.     • gabapentin (NEURONTIN) 100 MG Cap Take 200 mg by mouth at bedtime. Indications: Neuropathic Pain     • furosemide (LASIX) 20 MG Tab Take 20 mg by mouth 1 time a day as needed (edema, wt gain of 2 pounds or more overnight ). Indications: Edema     • potassium chloride SA (K-DUR) 10 MEQ Tab CR Take 20 mEq by mouth 1 time a day as needed (supplement). Indications: Low Amount of Potassium in the Blood, take with lasix     • BABY ASPIRIN PO Take 81 mg by mouth 2 times a day. change in dosage from hospital  Indications: cardiac     • EPINEPHrine (PRIMATENE MIST INH) Inhale 1 Inhalation 1 time a day as needed (sob). Indications: sob     • atorvastatin (LIPITOR) 20 MG Tab Take 20 mg by mouth every evening.     • lisinopril (PRINIVIL) 20 MG Tab Take 20 mg by mouth 2 times a day. Take 1 tablet by mouth twice a day     • metformin (GLUCOPHAGE) 1000 MG tablet Take 1,000 mg by mouth 2 times a day, with meals.     • Spacer/Aero-Holding Chambers Device 1 Device by Does not apply route as needed. (Patient taking differently: 1 Device as needed (for use with inhaler).) 1 Device 0     No current facility-administered medications for this visit.       ROS  All others systems reviewed and negative.     Objective:     /68 (BP Location: Left arm, Patient Position: Sitting, BP Cuff Size: Adult)   Pulse 89   Resp 16   Ht 1.524 m (5')   Wt 51 kg (112 lb 7 oz)   SpO2 97%  Body mass index is 21.96 kg/m².    General: No acute distress. Well nourished.  HEENT: EOM grossly intact, no scleral icterus, no pharyngeal erythema.   Neck:  + JVD at  90, no bruits, trachea midline  CVS: RRR. Normal S1, S2. No khai M/R/G. No LE edema.  2+ radial pulses, 2+ PT pulses  Resp: CTAB. No wheezing or crackles/rhonchi. Normal respiratory effort.  Abdomen: Soft, NT, no khai hepatomegaly.  MSK/Ext: No clubbing or cyanosis.  Skin: Warm and dry, no rashes.  Neurological: CN III-XII grossly intact. No focal deficits.   Psych: A&O x 3, appropriate affect, good judgement        Assessment:     1. Coronary artery disease involving native coronary artery of native heart without angina pectoris     2. Hx of CABG     3. History of breast cancer     4. Hypertension, unspecified type  EKG   5. Pulmonary hypertension ([facility])     6. Type 2 diabetes mellitus with hyperglycemia, with long-term current use of insulin ([facility])     7. Ischemic cardiomyopathy     8. Syncope and collapse     9. Bradycardia     10. Right bundle branch block (RBBB) with left anterior fascicular block (LAFB)     11. RVH (right ventricular hypertrophy)     12. LVH (left ventricular hypertrophy)     13. Chronic HFrEF (heart failure with reduced ejection fraction) ([facility])  EC-ECHOCARDIOGRAM COMPLETE W/O CONT    Basic Metabolic Panel       Medical Decision Making:  Today's Assessment / Status / Plan:     -Has chronic HFrEF and cor pulmonale  -RVSP 70 in the hospital  -Not a candidate for targeted therapies  -Remain on furosemide 40 mg, low threshold to change to torsemide if any evidence of gut edema  -Continue blood pressure control  -Continue lower dose of metoprolol  -For CAD, secondary prevention aspirin, statin, beta-blocker  -For low EF, beta-blocker and ACE inhibitor, she is on metoprolol tartrate due to cost, low threshold to change to carvedilol  -On good medications for type 2 diabetes, getting A1c down  -I do not want her to drink excessive water  -Continue to check her weights every day  -Repeat echocardiogram in about 2 months, okay to have it done at [facility] for convenience  -Close follow-up with EFREN  in 6 weeks, MD 3 months  -Nonfasting BMP to follow-up on kidney function      Written instructions given today:    -Your water pill is very important to keep extra water out of the system so your blood is more concentrated and moves more efficiently through the heart and lungs.    -Your furosemide is also called Lasix which means 6 hours.  It is potent for 6 hours.  You can take it anytime of day, just do not take it after 3 PM.  We do not want it to interfere with sleep.    -Do not drink extra water, this is the opposite of your furosemide water pill.      -If your weight ever reaches 115 pounds, that would be an alarming sign that you are retaining fluid and you would need to contact us here at cardiology.    -To reach [facility] cardiology, you always want to call the mother ship in [place]: [phone].  The cardiologist is only in [place] on Wednesdays so you want to call [place] which is the main cardiology office.      Return in about 6 weeks (around 4/20/2022).    It is my pleasure to participate in the care of Ms. Huang.  Please do not hesitate to contact me with questions or concerns.    Lisa Proctor MD, [facility]  Cardiologist [facility]    Please note that this dictation was created using voice recognition software. I have made every reasonable attempt to correct obvious errors, but it is possible there are errors of grammar and possibly content that I did not discover before finalizing the note.

## 2022-03-10 NOTE — PATIENT INSTRUCTIONS
-Your water pill is very important to keep extra water out of the system so your blood is more concentrated and moves more efficiently through the heart and lungs.    -Your furosemide is also called Lasix which means 6 hours.  It is potent for 6 hours.  You can take it anytime of day, just do not take it after 3 PM.  We do not want it to interfere with sleep.    -Do not drink extra water, this is the opposite of your furosemide water pill.      -If your weight ever reaches 115 pounds, that would be an alarming sign that you are retaining fluid and you would need to contact us here at cardiology.    -To reach [facility] cardiology, you always want to call the mother ship in [place]: [phone].  The cardiologist is only in [place] on Wednesdays so you want to call [place] which is the main cardiology office.

## 2022-03-24 NOTE — NON-PROVIDER
Patient Consult Note    TIME IN: 2:48  TIME OUT: 3:27    Primary care physician: MIGUEL ANGEL Tanner    Reason for consult: Management of Uncontrolled Type 2 Diabetes    HPI:  Sinai Huang is a 86 y.o. old patient who comes in today for evaluation of above stated problem.    Most Recent HbA1c:   Lab Results   Component Value Date/Time    HBA1C 7.4 (A) 02/03/2022 03:54 PM      Lab Results   Component Value Date/Time    CREATININE 0.84 04/12/2021 08:45 AM              Diabetes Medication History and Current Regimen  Metformin: 1000mg po bid   GLP-1 Agent: Dulaglutide 0.75 mg once weekly   SGLT-2 Inhibitor:  Empagliflozin 25 mg once daily       Pt has home glucometer and proper testing technique - yes    Pt reports blood sugars:     Other times: 139-207    Hypoglycemia awareness - yes  Nocturnal hypoglycemia- none  Hypoglycemia:  None    Pt's treatment of Hypoglycemia - n/a  - 15:15 Rule      Lab Results   Component Value Date/Time    HBA1C 7.4 (A) 02/03/2022 03:54 PM      Last Microalbuminuria -       updated caregaps    Past Medical History:  Patient Active Problem List    Diagnosis Date Noted   • Obstructive sleep apnea 01/12/2022   • Chronic systolic heart failure (HCC) 11/22/2021   • Dependence on supplemental oxygen 11/22/2021   • Hx of CABG 11/22/2021   • Pulmonary hypertension (HCC) 11/22/2021   • Sciatica 11/22/2021   • Type 2 diabetes mellitus with hyperglycemia ([facility]) 11/22/2021   • History of breast cancer 03/26/2021   • CAD (coronary artery disease) 11/25/2020   • Chronic pain disorder 11/25/2020   • Heart failure with reduced ejection fraction, NYHA class III ([facility]) 11/25/2020   • Hyperlipidemia 11/25/2020   • Nonrheumatic mitral valve regurgitation 11/25/2020   • Osteopenia of multiple sites 11/25/2020   • Nonrheumatic aortic valve stenosis 11/25/2020   • Multinodular goiter 11/25/2020   • Ischemic cardiomyopathy 11/25/2020   • Primary osteoarthritis involving multiple joints 11/25/2020   •  Right bundle branch block (RBBB) 11/25/2020   • Hypertension 11/20/2020       Past Surgical History:  No past surgical history on file.    Allergies:  Patient has no known allergies.    Social History:  Social History     Socioeconomic History   • Marital status:      Spouse name: Not on file   • Number of children: Not on file   • Years of education: Not on file   • Highest education level: Not on file   Occupational History   • Not on file   Tobacco Use   • Smoking status: Never Smoker   • Smokeless tobacco: Never Used   Vaping Use   • Vaping Use: Never used   Substance and Sexual Activity   • Alcohol use: Never   • Drug use: Never   • Sexual activity: Not Currently   Other Topics Concern   • Not on file   Social History Narrative   • Not on file     Social Determinants of Health     Financial Resource Strain: Not on file   Food Insecurity: Not on file   Transportation Needs: Not on file   Physical Activity: Not on file   Stress: Not on file   Social Connections: Not on file   Intimate Partner Violence: Not on file   Housing Stability: Not on file       Family History:  No family history on file.    Medications:    Current Outpatient Medications:   •  Dulaglutide (TRULICITY) 1.5 MG/0.5ML Solution Pen-injector, Inject 1 Each under the skin every 7 days., Disp: , Rfl:   •  cyclobenzaprine (FLEXERIL) 5 mg tablet, TAKE 1 TABLET BY MOUTH 1 TIME A DAY AS NEEDED., Disp: 30 Tablet, Rfl: 0  •  VITAMIN D PO, Take  by mouth., Disp: , Rfl:   •  Calcium Carbonate (CALCIUM 500 PO), Take  by mouth., Disp: , Rfl:   •  PX Lancets Ultra Thin 28G Misc, test 3 times a day, Disp: , Rfl:   •  lidocaine (LIDODERM) 5 % Patch, , Disp: , Rfl:   •  albuterol 108 (90 Base) MCG/ACT Aero Soln inhalation aerosol, Inhale 2 Puffs every 6 hours as needed., Disp: , Rfl:   •  metoprolol tartrate (LOPRESSOR) 25 MG Tab, Take 25 mg by mouth 2 times a day., Disp: , Rfl:   •  meclizine (ANTIVERT) 12.5 MG Tab, Take 12.5 mg by mouth 1 time a day as  needed for Dizziness., Disp: , Rfl:   •  Pyridoxine HCl (VITAMIN B6) 100 MG Tab, Take 100 mg by mouth every day., Disp: , Rfl:   •  Multiple Vitamins-Minerals (MULTIVITAMIN WOMEN) Tab, Take 1 Tablet by mouth every day., Disp: , Rfl:   •  Home Care Oxygen, Inhale 1 L/min as needed for Shortness of Breath (shortness of breath). patient has own concentrator Oxygen dose range: 1 L/min Respiratory route via: Nasal Cannula  Oxygen supplier: [employer]  , Disp: , Rfl:   •  Acetaminophen 500 MG Cap, Take 1,000 mg by mouth every 6 hours as needed (pain)., Disp: , Rfl:   •  ranolazine (RANEXA) 500 MG TABLET SR 12 HR, Take 500 mg by mouth 2 times a day., Disp: , Rfl:   •  nitroglycerin (NITROSTAT) 0.4 MG SL Tab, Place 0.4 mg under the tongue as needed for Chest Pain., Disp: , Rfl:   •  gabapentin (NEURONTIN) 100 MG Cap, Take 200 mg by mouth at bedtime. Indications: Neuropathic Pain, Disp: , Rfl:   •  furosemide (LASIX) 20 MG Tab, Take 20 mg by mouth 1 time a day as needed (edema, wt gain of 2 pounds or more overnight ). Indications: Edema, Disp: , Rfl:   •  potassium chloride SA (K-DUR) 10 MEQ Tab CR, Take 20 mEq by mouth 1 time a day as needed (supplement). Indications: Low Amount of Potassium in the Blood, take with lasix, Disp: , Rfl:   •  BABY ASPIRIN PO, Take 81 mg by mouth 2 times a day. change in dosage from hospital  Indications: cardiac, Disp: , Rfl:   •  EPINEPHrine (PRIMATENE MIST INH), Inhale 1 Inhalation 1 time a day as needed (sob). Indications: sob, Disp: , Rfl:   •  atorvastatin (LIPITOR) 20 MG Tab, Take 20 mg by mouth every evening., Disp: , Rfl:   •  lisinopril (PRINIVIL) 20 MG Tab, Take 20 mg by mouth 2 times a day. Take 1 tablet by mouth twice a day, Disp: , Rfl:   •  metformin (GLUCOPHAGE) 1000 MG tablet, Take 1,000 mg by mouth 2 times a day, with meals., Disp: , Rfl:   •  Spacer/Aero-Holding Chambers Device, 1 Device by Does not apply route as needed. (Patient taking differently: 1 Device as needed  (for use with inhaler).), Disp: 1 Device, Rfl: 0    Labs: Reviewed    Physical Examination:  Vital signs: Wt 50.3 kg (111 lb)   BMI 21.68 kg/m²  Body mass index is 21.68 kg/m².    Assessment and Plan:    1. DM2  • Pt tolerating Trulicity 0.75mg, will further optimize to 1.5mg.  • Pt denied PAP for jardiance (excessive income)  • Pt optimized on metformin 1000mg po bid  • Pt continues to supplement with 10 units of long acting insulin  • Weight is flat  • If pt is at goal, will complete [employer] PAP (higher income ceiling) and fax.  Pt son agrees to bring in tax returns.  •     - Medication changes:  • Increase Trulicity to 1.5mg sub q weekly, samples provided.     - Lifestyle changes:  • Continue to reduce simple carbohydrate ingestion.  • Continue to try to increase duration and intensity of exercise    Return in about 2 weeks (around 4/7/2022).    Soco Burns  03/24/22    CC:   MIGUEL ANGEL Tanner

## 2022-04-08 NOTE — TELEPHONE ENCOUNTER
Pt no showed DM visit with the pharmacist  Left VM message to reschedule  Soco Burns, Clinical Pharmacist, CDE, CACP

## 2022-04-14 NOTE — PROGRESS NOTES
Patient Consult Note    TIME IN: 2:00  TIME OUT: 2:30    Primary care physician: MIGUEL ANGEL Tanner    Reason for consult: Management of Controlled Type 2 Diabetes    HPI:  Sinai Huang is a 86 y.o. old patient who comes in today for evaluation of above stated problem.    Most Recent HbA1c and POCT glucose:   Lab Results   Component Value Date/Time    HBA1C 7.4 (A) 02/03/2022 03:54 PM      Recent Labs     04/14/22  1414   POCGLUCOSE 97       Most Recent Scr:  Lab Results   Component Value Date/Time    CREATININE 0.84 04/12/2021 08:45 AM        Diabetes Medication History and Current Regimen  Metformin: IR/ER 1000mg po bid   GLP-1 Agent: Trulicity 1.5mg        Basal Insulin: lantus 6 units qhs  Prandial Insulin: yes       Pt has home glucometer and proper testing technique - yes    Pt reports blood sugars:     Other times: 110-134    Hypoglycemia awareness - yes  Nocturnal hypoglycemia- none  Hypoglycemia:  None    Pt's treatment of Hypoglycemia - n/a  - 15:15 Rule    Current Exercise - none  Exercise Goal - pt would benefit from 30 minutes of daily dedicated walking    Dietary - common adult      Foot Exam:  Monofilament exam - current  Monofilament testing with a 10 gram force: sensation intact: decreased bilaterally.    Visual Inspection: Feet without maceration, ulcers, fissures.  Feet dry.  Pedal pulses: intact bilaterally    Preventative Management  BP regimen (ACE/ARB) - lisinopril 20mg po daily  ASA - 81mg po daily  Statin - atorva 20  Last Retinal Scan - overdue  Last Foot Exam - current  Last A1c -   Lab Results   Component Value Date/Time    HBA1C 7.4 (A) 02/03/2022 03:54 PM      Last Microalbuminuria - current     updated caregaps    Past Medical History:  Patient Active Problem List    Diagnosis Date Noted   • Obstructive sleep apnea 01/12/2022   • Chronic systolic heart failure (HCC) 11/22/2021   • Dependence on supplemental oxygen 11/22/2021   • Hx of CABG 11/22/2021   • Pulmonary  hypertension ([facility]) 11/22/2021   • Sciatica 11/22/2021   • Type 2 diabetes mellitus with hyperglycemia ([facility]) 11/22/2021   • History of breast cancer 03/26/2021   • CAD (coronary artery disease) 11/25/2020   • Chronic pain disorder 11/25/2020   • Heart failure with reduced ejection fraction, NYHA class III ([facility]) 11/25/2020   • Hyperlipidemia 11/25/2020   • Nonrheumatic mitral valve regurgitation 11/25/2020   • Osteopenia of multiple sites 11/25/2020   • Nonrheumatic aortic valve stenosis 11/25/2020   • Multinodular goiter 11/25/2020   • Ischemic cardiomyopathy 11/25/2020   • Primary osteoarthritis involving multiple joints 11/25/2020   • Right bundle branch block (RBBB) 11/25/2020   • Hypertension 11/20/2020       Past Surgical History:  No past surgical history on file.    Allergies:  Patient has no known allergies.    Social History:  Social History     Socioeconomic History   • Marital status:      Spouse name: Not on file   • Number of children: Not on file   • Years of education: Not on file   • Highest education level: Not on file   Occupational History   • Not on file   Tobacco Use   • Smoking status: Never Smoker   • Smokeless tobacco: Never Used   Vaping Use   • Vaping Use: Never used   Substance and Sexual Activity   • Alcohol use: Never   • Drug use: Never   • Sexual activity: Not Currently   Other Topics Concern   • Not on file   Social History Narrative   • Not on file     Social Determinants of Health     Financial Resource Strain: Not on file   Food Insecurity: Not on file   Transportation Needs: Not on file   Physical Activity: Not on file   Stress: Not on file   Social Connections: Not on file   Intimate Partner Violence: Not on file   Housing Stability: Not on file       Family History:  No family history on file.    Medications:    Current Outpatient Medications:   •  Dulaglutide (TRULICITY) 3 MG/0.5ML Solution Pen-injector, Inject 1 Each under the skin every 7 days., Disp: 2 mL, Rfl: 0  •   cyclobenzaprine (FLEXERIL) 5 mg tablet, TAKE 1 TABLET BY MOUTH 1 TIME A DAY AS NEEDED., Disp: 30 Tablet, Rfl: 0  •  VITAMIN D PO, Take  by mouth., Disp: , Rfl:   •  Calcium Carbonate (CALCIUM 500 PO), Take  by mouth., Disp: , Rfl:   •  PX Lancets Ultra Thin 28G Misc, test 3 times a day, Disp: , Rfl:   •  lidocaine (LIDODERM) 5 % Patch, , Disp: , Rfl:   •  albuterol 108 (90 Base) MCG/ACT Aero Soln inhalation aerosol, Inhale 2 Puffs every 6 hours as needed., Disp: , Rfl:   •  metoprolol tartrate (LOPRESSOR) 25 MG Tab, Take 25 mg by mouth 2 times a day., Disp: , Rfl:   •  meclizine (ANTIVERT) 12.5 MG Tab, Take 12.5 mg by mouth 1 time a day as needed for Dizziness., Disp: , Rfl:   •  Pyridoxine HCl (VITAMIN B6) 100 MG Tab, Take 100 mg by mouth every day., Disp: , Rfl:   •  Multiple Vitamins-Minerals (MULTIVITAMIN WOMEN) Tab, Take 1 Tablet by mouth every day., Disp: , Rfl:   •  Home Care Oxygen, Inhale 1 L/min as needed for Shortness of Breath (shortness of breath). patient has own concentrator Oxygen dose range: 1 L/min Respiratory route via: Nasal Cannula  Oxygen supplier: [employer]  , Disp: , Rfl:   •  Acetaminophen 500 MG Cap, Take 1,000 mg by mouth every 6 hours as needed (pain)., Disp: , Rfl:   •  ranolazine (RANEXA) 500 MG TABLET SR 12 HR, Take 500 mg by mouth 2 times a day., Disp: , Rfl:   •  nitroglycerin (NITROSTAT) 0.4 MG SL Tab, Place 0.4 mg under the tongue as needed for Chest Pain., Disp: , Rfl:   •  gabapentin (NEURONTIN) 100 MG Cap, Take 200 mg by mouth at bedtime. Indications: Neuropathic Pain, Disp: , Rfl:   •  furosemide (LASIX) 20 MG Tab, Take 20 mg by mouth 1 time a day as needed (edema, wt gain of 2 pounds or more overnight ). Indications: Edema, Disp: , Rfl:   •  potassium chloride SA (K-DUR) 10 MEQ Tab CR, Take 20 mEq by mouth 1 time a day as needed (supplement). Indications: Low Amount of Potassium in the Blood, take with lasix, Disp: , Rfl:   •  BABY ASPIRIN PO, Take 81 mg by mouth 2  times a day. change in dosage from hospital  Indications: cardiac, Disp: , Rfl:   •  EPINEPHrine (PRIMATENE MIST INH), Inhale 1 Inhalation 1 time a day as needed (sob). Indications: sob, Disp: , Rfl:   •  atorvastatin (LIPITOR) 20 MG Tab, Take 20 mg by mouth every evening., Disp: , Rfl:   •  lisinopril (PRINIVIL) 20 MG Tab, Take 20 mg by mouth 2 times a day. Take 1 tablet by mouth twice a day, Disp: , Rfl:   •  metformin (GLUCOPHAGE) 1000 MG tablet, Take 1,000 mg by mouth 2 times a day, with meals., Disp: , Rfl:   •  Spacer/Aero-Holding Chambers Device, 1 Device by Does not apply route as needed. (Patient taking differently: 1 Device as needed (for use with inhaler).), Disp: 1 Device, Rfl: 0    Labs: Reviewed    Physical Examination:  Vital signs: Wt 49.9 kg (110 lb)   BMI 21.48 kg/m²  Body mass index is 21.48 kg/m².    Assessment and Plan:    1. DM2  • Pt has been tolerating trulicity 1.5mg sub q weekly, she has been supplementing with 6-10 units of long acting insulin.  Will increase to Trulicity 3mg sub q weekly with NO insulin  • Pt is currently optimized on metformin 1000mg po bid  • Pt is unable to afford her jardiance and did not qualilfy for patient assistance  • Pt missed her appointment last week  • Pt son did NOT bring in proof of income for me to apply to [employer]    - Medication changes:  • trulicity 3mg sub q weekly     - Lifestyle changes:  • Continue present management    Follow Up:  4 weeks    Soco Burns    CC:   MIGUEL ANGEL Tanner

## 2022-04-15 NOTE — TELEPHONE ENCOUNTER
Patient's son Harpal came into the office stating that he misplaced the patient's Trulicity Voucher and would like another one.     He is requesting a call back from you at [phone].

## 2022-04-19 NOTE — TELEPHONE ENCOUNTER
Spoke with Sinai, who will look for her proof of income to qualify for [employer].  Soco Burns, Clinical Pharmacist, CDE, CACP

## 2022-04-28 NOTE — TELEPHONE ENCOUNTER
VOICEMAIL  1. Caller Name: Aparna                                Call Back Number: [phone]    2. Message: Pharmacy received written Rx by e-fax without PCP signature. Please call to give verbal approval.    Called and spoke to Walter RANKIN Pharmacist with [facility] at [phone] and I informed her that Dr. Mccartney sent the e-fax on 4/22/22 for Dulaglutide (TRULICITY) 3 MG/0.5ML Solution Pen-injector      Dose: 1 Each Route: Subcutaneous Frequency: EVERY 7 DAYS   Duration: --     Sig: Inject 1 Each under the skin every 7 days.         Dispense Quantity: 8 mL Refills: 3    Associated Diagnoses: Type 2 diabetes mellitus with hyperglycemia, with long-term current use of insulin (HCC)

## 2022-05-13 NOTE — TELEPHONE ENCOUNTER
Left message to reschedule missed DM visit with the pharmacist.  Soco Burns, Clinical Pharmacist, CDE, CACP

## 2022-06-06 NOTE — PROGRESS NOTES
Subjective:   Chief Complaint:   Chief Complaint   Patient presents with   • Coronary Artery Disease     F/V Dx: Coronary artery disease involving native coronary artery of native heart without angina pectoris   • Aortic Atherosclerosis   • Cardiomyopathy (Ischemic)     Sinai Huang is a 86 y.o. female who returns today for CAD/CABG, chronic angina, ischemic cardiomyopathy, chronic systolic heart failure, syncope and collapse, right bundle branch block, bradycardia.    CAD with chronic chest pain.  Has CAD, CABG in [id number] at [facility] in [place].  EF 20 to 25% [facility] by echo November 2021.  Nuclear stress test [facility] October 2021 with mild fixed inferior wall defect, no ischemia.  EF calculated at 32%.    For CAD, remains on aspirin, statin, beta-blocker.  Previous cardiologist thought she was having costochondritis per his note 6/7/2021 by Dr. Zimmerman.  She is on Ranexa, she does not recall when this started.  Has had chest pain requiring 2 nitroglycerin since last visit.  This took care of the chest pain.    Echocardiogram limited at [facility] November 2021 with an EF of 40 to 45%.  Has been on beta-blocker, ACE inhibitor.    Has right bundle branch block, LAFB.    Has chronic HFrEF and cor pulmonale.  [place] heart class III, stage C.  Checking weight at home, gets up 111, typically 110, rarely 112 pounds    Severe secondary pulmonary hypertension, RVSP as high as 70 at [facility] October 2021.  On very low-dose furosemide.    Prior hypertension, now blood pressure is low associated with lightheadedness.  Not checking at home.    Has hyperlipidemia, on statin, moderate intensity.  LDL 67.    Has type 2 diabetes, on Trulicity, Jardiance, sees [employer], clinical pharm.  A1C in controlled.    Chronic oxygen but does not wear it all the time.    Prior breast cancer, right, chemo and radiation.    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     Patient was admitted to [facility] 11/22/2021, I was able to review the  discharge diagnosis of the same date.  She was there was syncope and collapse and shoulder pain.  Was treated for hypokalemia and hyponatremia.  Metoprolol dose was reduced because of bradycardia.  Follow-up blood work February 24, 2022, potassium and sodium had normalized.  Creatinine up slightly     Lives in [place].   to Thanh,  66 years.  Accompanied by her son, Harpal.    DATA REVIEWED by me:  ECG (my personal interpretation) 3/9/2022  Sinus, 87, RBBB, RVH/LVH, LAFB    Echocardiogram 6/15/2022-requested is limited  The Ejection Fraction estimate is 40-45%, with wall motion abnormalities  The right ventricular moderately dilated with severely reduced stock function.   The left atrial size is normal.   IVC was normal size and collapse, RAP estimated to be 3 mmHg.  No TR jet for gradient.  Limited echo       Echo 11/20/2021 [facility]  Interpretation Summary   The Ejection Fraction estimate is 40-45%   The right ventricular systolic function is severely reduced.   The left atrial size is normal.   Limited echo     Echo 10/3/2021 [facility]  There is severe global hypokinesis of the left ventricle.   There is posterior wall severe hypokinesis   There is severe inferior wall hypokinesis.   The Ejection Fraction estimate is 20-25%   A variety of Doppler measurements indicate restrictive inflow patterns associated with grade III   diastolic dysfunction and increased left atrial pressures.   The right ventricle is moderate to severely dilated.   The right ventricular systolic function is severely reduced.   The left atrium is severely dilated.   The right atrium is moderately dilated.   There is severe mitral regurgitation   There is moderate tricuspid regurgitation   Right ventricular systolic pressure is elevated at 65-70 mmHg   Moderate to severe pulmonary hypertension is demonstrated.   There is mild to moderate pulmonic regurgitation   Mild pulmonary artery dilation.   Small right pleural  effusion.     Echocardiogram [facility] 12/29/2020.  Interpretation Summary   There is mild concentric left ventricular hypertrophy. Inferior wall infarct noted. Left ventricular   systolic function is mildly reduced. The Ejection Fraction estimate is 40-45%   There is moderate mitral regurgitation   Right ventricular systolic pressure is elevated at 55-60 mmHg     Nuclear stress test 10/3/2021 [facility]  •  Left ventricular perfusion is abnormal. Mild fixed defect in the   inferior wall c/w previous infarction no ischemia is noted   •  Abnormal LV function (32%). Hypokinetic inferior  and septal walls   •  No stress ECG evidence of ischemia.   •  No recovery ECG evidence of ischemia.      CT chest 10/3/2021  1.  No pulmonary emboli, thoracic aortic aneurysm or arterial dissection.   2.  Prior sternotomy.  Moderate cardiomegaly with pulmonary vascular congestion,   diffuse groundglass interstitial pattern throughout both lungs with enlarging   posterior layering pleural effusions consistent with a component of CHF.   Compressive atelectasis noted within the dependent portions of both lungs.   Concomitant pneumonia is not excluded.   3.  Suspect multinodular goiter.   3.  Mild left adrenal gland enlargement indeterminate between adenomatous   involvement or mild hyperplasia, unchanged.       CT head 11/20/2021  No acute abnormalities    Most recent labs:     2-24-22 sodium 136, potassium 4.9, LFTs normal, BUN 28, creatinine 1.38, total cholesterol 132, triglycerides 141, LDL 67, HDL 48 hemoglobin 13.7, platelets 165, TSH 0.51, free T4 0.87  Lab Results   Component Value Date/Time    WBC 5.5 04/12/2021 08:45 AM    HEMOGLOBIN 14.2 04/12/2021 08:45 AM    HEMATOCRIT 41.7 04/12/2021 08:45 AM    MCV 91.2 04/12/2021 08:45 AM      Lab Results   Component Value Date/Time    SODIUM 133 (L) 04/12/2021 08:45 AM    POTASSIUM 4.2 04/12/2021 08:45 AM    CHLORIDE 97 04/12/2021 08:45 AM    CO2 25 04/12/2021 08:45 AM    GLUCOSE  154 (H) 04/12/2021 08:45 AM    BUN 8 04/12/2021 08:45 AM    CREATININE 0.84 04/12/2021 08:45 AM      Lab Results   Component Value Date/Time    ASTSGOT 18 04/12/2021 08:45 AM    ALTSGPT 31 04/12/2021 08:45 AM    ALBUMIN 4.3 04/12/2021 08:45 AM      No results found for: CHOLSTRLTOT, LDL, HDL, TRIGLYCERIDE  No results for input(s): NTPROBNP, TROPONINT in the last 72 hours.      Past Medical History:   Diagnosis Date   • Closed fracture of rib of right side 11/22/2021   • Recurrent falls 3/26/2021   • Type 2 diabetes mellitus without complication (HCC) 11/20/2020     History reviewed. No pertinent surgical history.  History reviewed. No pertinent family history.  Social History     Socioeconomic History   • Marital status:      Spouse name: Not on file   • Number of children: Not on file   • Years of education: Not on file   • Highest education level: Not on file   Occupational History   • Not on file   Tobacco Use   • Smoking status: Never Smoker   • Smokeless tobacco: Never Used   Vaping Use   • Vaping Use: Never used   Substance and Sexual Activity   • Alcohol use: Never   • Drug use: Never   • Sexual activity: Not Currently   Other Topics Concern   • Not on file   Social History Narrative   • Not on file     Social Determinants of Health     Financial Resource Strain: Not on file   Food Insecurity: Not on file   Transportation Needs: Not on file   Physical Activity: Not on file   Stress: Not on file   Social Connections: Not on file   Intimate Partner Violence: Not on file   Housing Stability: Not on file     No Known Allergies    Current Outpatient Medications   Medication Sig Dispense Refill   • pregabalin (LYRICA) 50 MG capsule Take 50 mg by mouth. Indications: Takes once a week     • HYDROcodone-acetaminophen (NORCO) 5-325 MG Tab per tablet Take 1 Tablet by mouth every 24 hours as needed. FOR PAIN     • metoprolol SR (TOPROL XL) 25 MG TABLET SR 24 HR Take 1 Tablet by mouth every day. 90 Tablet 7   •  lisinopril (PRINIVIL) 10 MG Tab Take 1 Tablet by mouth every day. 90 Tablet 7   • cyclobenzaprine (FLEXERIL) 5 mg tablet Take 1 Tablet by mouth 1 time a day as needed for Muscle Spasms. 30 Tablet 1   • Dulaglutide (TRULICITY) 3 MG/0.5ML Solution Pen-injector Inject 1 Each under the skin every 7 days. 8 mL 3   • VITAMIN D PO Take  by mouth.     • Calcium Carbonate (CALCIUM 500 PO) Take  by mouth.     • lidocaine (LIDODERM) 5 % Patch      • albuterol 108 (90 Base) MCG/ACT Aero Soln inhalation aerosol Inhale 2 Puffs every 6 hours as needed.     • meclizine (ANTIVERT) 12.5 MG Tab Take 12.5 mg by mouth 1 time a day as needed for Dizziness.     • Pyridoxine HCl (VITAMIN B6) 100 MG Tab Take 100 mg by mouth every day.     • Multiple Vitamins-Minerals (MULTIVITAMIN WOMEN) Tab Take 1 Tablet by mouth every day.     • Home Care Oxygen Inhale 1 L/min as needed for Shortness of Breath (shortness of breath). patient has own concentrator  Oxygen dose range: 1 L/min  Respiratory route via: Nasal Cannula   Oxygen supplier: [employer]         • Acetaminophen 500 MG Cap Take 1,000 mg by mouth every 6 hours as needed (pain).     • ranolazine (RANEXA) 500 MG TABLET SR 12 HR Take 500 mg by mouth 2 times a day.     • nitroglycerin (NITROSTAT) 0.4 MG SL Tab Place 0.4 mg under the tongue as needed for Chest Pain.     • gabapentin (NEURONTIN) 100 MG Cap Take 200 mg by mouth at bedtime. Indications: Neuropathic Pain     • furosemide (LASIX) 20 MG Tab Take 20 mg by mouth 1 time a day as needed (edema, wt gain of 2 pounds or more overnight ). Indications: Edema     • potassium chloride SA (K-DUR) 10 MEQ Tab CR Take 20 mEq by mouth 1 time a day as needed (supplement). Indications: Low Amount of Potassium in the Blood, take with lasix     • BABY ASPIRIN PO Take 81 mg by mouth 2 times a day. change in dosage from hospital  Indications: cardiac     • EPINEPHrine (PRIMATENE MIST INH) Inhale 1 Inhalation 1 time a day as needed (sob).  Indications: sob     • atorvastatin (LIPITOR) 20 MG Tab Take 20 mg by mouth every evening.     • metformin (GLUCOPHAGE) 1000 MG tablet Take 1,000 mg by mouth 2 times a day, with meals.     • Spacer/Aero-Holding Chambers Device 1 Device by Does not apply route as needed. (Patient taking differently: 1 Device as needed (for use with inhaler).) 1 Device 0     No current facility-administered medications for this visit.       ROS    All others systems reviewed and negative.     Objective:     /52 (BP Location: Left arm, Patient Position: Sitting, BP Cuff Size: Child)   Pulse 80   Resp 16   Ht 1.524 m (5')   Wt 48.6 kg (107 lb 2.3 oz)   SpO2 99%  Body mass index is 20.93 kg/m².    General: No acute distress. Thin  HEENT: EOM grossly intact, no scleral icterus, no pharyngeal erythema.   Neck:  + JVD at 90, no bruits, trachea midline  CVS: RRR. Normal S1, S2. No khai M/R/G. No LE edema.  2+ radial pulses, 2+ PT pulses  Resp: CTAB. No wheezing or crackles/rhonchi. Normal respiratory effort.  Abdomen: Soft, NT, no khai hepatomegaly.  MSK/Ext: No clubbing or cyanosis.  Skin: Warm and dry, no rashes.  Neurological: CN III-XII grossly intact. No focal deficits. Rolling walker  Psych: A&O x 3, appropriate affect, good judgement    Physical exam performed today and unchanged, except what is noted, compared to 3-9-22      Assessment:     1. Coronary artery disease involving native coronary artery of native heart without angina pectoris     2. Hx of CABG     3. History of breast cancer     4. Pulmonary hypertension (HCC)     5. Type 2 diabetes mellitus with hyperglycemia, with long-term current use of insulin ([facility])     6. Syncope and collapse     7. Ischemic cardiomyopathy     8. Bradycardia     9. Right bundle branch block (RBBB) with left anterior fascicular block (LAFB)     10. LVH (left ventricular hypertrophy)     11. Chronic HFrEF (heart failure with reduced ejection fraction) ([facility])     12. RVH (right ventricular  hypertrophy)     13. Essential hypertension         Medical Decision Making:  Today's Assessment / Status / Plan:     -Has chronic HFpEF and cor pulmonale, RV dysfunction, cont lasix  -No plans for targeted therapy for severe secondary pulmonary hypertension, just Lasix  -Blood pressure actually looks low some going to reduce her medications  -Low EF medications include ACE inhibitor and metoprolol but again I am lowering the doses of both  -For CAD, continue secondary prevention aspirin, statin, beta-blocker  -In terms of chronic stable angina, nitro has been effective.  She cannot recall if Ranexa is helpful.  I will have her trial off of it for 2 weeks and if she feels no different she can stay off of it.  If it turns out it is helping, she will let us know.  -LDL to goal  -On good medications for type 2 diabetes  -Nonfasting BMP to follow-up on kidney function  -RTC 6 months APN, then MD 1 year    Written instructions given today:      -Please get some nonfasting blood work to look at your kidney function and electrolytes in the near future.  I ordered this today.  He can have this drawn at any lab.    -Your lightheadedness and unstable mobility may be related to blood pressure that is too low.    -I want to reduce your metoprolol, follow these instructions:    -Finish up your metoprolol tartrate but cut the pills in half and take half a tablet in the morning and half a tablet in the evening.    -Your next prescription is for metoprolol succinate 25 mg once daily.  This will reduce the overall total daily dose and with the succinate you only have to take it once a day.    -Cut your lisinopril in half from 20 mg down to 10 mg.  You can cut your current tablets in half.  I will send a new prescription.    -If your blood pressure remains consistently under 110 on the top number, you can reduce your lisinopril again down to 5 mg.    -Ranolazine is also called Ranexa.  It is a medication for chronic chest pain.  I  find that it only helps 50% of my patients.  You can stop ranolazine for the next 2 weeks and see if your chest pain gets worse.  If you feel the exact same off of the medication, you can stop taking it.  If you find that it does help you then go ahead and restart it.    -Be sure to report back at your next cardiology visit if there were noticing is helping your chest pain.    Return in about 6 months (around 12/15/2022).    It is my pleasure to participate in the care of Ms. Huang.  Please do not hesitate to contact me with questions or concerns.    Lisa Proctor MD, [facility]  Cardiologist [facility]    Please note that this dictation was created using voice recognition software. I have made every reasonable attempt to correct obvious errors, but it is possible there are errors of grammar and possibly content that I did not discover before finalizing the note.

## 2022-06-09 NOTE — PROGRESS NOTES
Patient Consult Note    TIME IN: 2:20  TIME OUT: 2:55    Primary care physician: MIGUEL ANGEL Tanner    Reason for consult: Management of Controlled Type 2 Diabetes    HPI:  Sinai Huang is a 86 y.o. old patient who comes in today for evaluation of above stated problem.    Most Recent HbA1c and POCT glucose:   Lab Results   Component Value Date/Time    HBA1C 6.7 (A) 06/09/2022 02:29 PM      Recent Labs     06/09/22  1429   POCGLUCOSE 176*       Most Recent Scr:  Lab Results   Component Value Date/Time    CREATININE 0.84 04/12/2021 08:45 AM        Diabetes Medication History and Current Regimen  Metformin: IR/ER 1000mg po bid   GLP-1 Agent: Trulicity 3mg sub q weekly   SGLT-2 Inhibitor: not affordable       Basal Insulin: 9 units       Pt has home glucometer and proper testing technique - yes    Pt reports blood sugars:     Other times: 150s    Hypoglycemia awareness - yes  Nocturnal hypoglycemia- none  Hypoglycemia:  None    Pt's treatment of Hypoglycemia - n/a  - 15:15 Rule    Current Exercise - none  Exercise Goal - 30 minutes of daily dedicated walking     Dietary - common adult, but drinks chocolate milk    Foot Exam:  Monofilament exam - declines  Monofilament testing with a 10 gram force: sensation intact: decreased bilaterally.    Visual Inspection: Feet without maceration, ulcers, fissures.  Feet dry.  Pedal pulses: intact bilaterally    Preventative Management  BP regimen (ACE/ARB) - lisinopril 20mg po daily  ASA - 81mg po daily  Statin - atorva 20  Last Retinal Scan - reminded  Last Foot Exam - current  Last A1c -   Lab Results   Component Value Date/Time    HBA1C 6.7 (A) 06/09/2022 02:29 PM      Last Microalbuminuria -       updated caregaps    Past Medical History:  Patient Active Problem List    Diagnosis Date Noted   • Obstructive sleep apnea 01/12/2022   • Chronic systolic heart failure (HCC) 11/22/2021   • Dependence on supplemental oxygen 11/22/2021   • Hx of CABG 11/22/2021   •  Pulmonary hypertension ([facility]) 11/22/2021   • Sciatica 11/22/2021   • Type 2 diabetes mellitus with hyperglycemia ([facility]) 11/22/2021   • History of breast cancer 03/26/2021   • CAD (coronary artery disease) 11/25/2020   • Chronic pain disorder 11/25/2020   • Heart failure with reduced ejection fraction, NYHA class III ([facility]) 11/25/2020   • Hyperlipidemia 11/25/2020   • Nonrheumatic mitral valve regurgitation 11/25/2020   • Osteopenia of multiple sites 11/25/2020   • Nonrheumatic aortic valve stenosis 11/25/2020   • Multinodular goiter 11/25/2020   • Ischemic cardiomyopathy 11/25/2020   • Primary osteoarthritis involving multiple joints 11/25/2020   • Right bundle branch block (RBBB) 11/25/2020   • Hypertension 11/20/2020       Past Surgical History:  No past surgical history on file.    Allergies:  Patient has no known allergies.    Social History:  Social History     Socioeconomic History   • Marital status:      Spouse name: Not on file   • Number of children: Not on file   • Years of education: Not on file   • Highest education level: Not on file   Occupational History   • Not on file   Tobacco Use   • Smoking status: Never Smoker   • Smokeless tobacco: Never Used   Vaping Use   • Vaping Use: Never used   Substance and Sexual Activity   • Alcohol use: Never   • Drug use: Never   • Sexual activity: Not Currently   Other Topics Concern   • Not on file   Social History Narrative   • Not on file     Social Determinants of Health     Financial Resource Strain: Not on file   Food Insecurity: Not on file   Transportation Needs: Not on file   Physical Activity: Not on file   Stress: Not on file   Social Connections: Not on file   Intimate Partner Violence: Not on file   Housing Stability: Not on file       Family History:  No family history on file.    Medications:    Current Outpatient Medications:   •  cyclobenzaprine (FLEXERIL) 5 mg tablet, Take 1 Tablet by mouth 1 time a day as needed for Muscle Spasms., Disp: 30  Tablet, Rfl: 1  •  Dulaglutide (TRULICITY) 3 MG/0.5ML Solution Pen-injector, Inject 1 Each under the skin every 7 days., Disp: 8 mL, Rfl: 3  •  VITAMIN D PO, Take  by mouth., Disp: , Rfl:   •  Calcium Carbonate (CALCIUM 500 PO), Take  by mouth., Disp: , Rfl:   •  lidocaine (LIDODERM) 5 % Patch, , Disp: , Rfl:   •  albuterol 108 (90 Base) MCG/ACT Aero Soln inhalation aerosol, Inhale 2 Puffs every 6 hours as needed., Disp: , Rfl:   •  metoprolol tartrate (LOPRESSOR) 25 MG Tab, Take 25 mg by mouth 2 times a day., Disp: , Rfl:   •  meclizine (ANTIVERT) 12.5 MG Tab, Take 12.5 mg by mouth 1 time a day as needed for Dizziness., Disp: , Rfl:   •  Pyridoxine HCl (VITAMIN B6) 100 MG Tab, Take 100 mg by mouth every day., Disp: , Rfl:   •  Multiple Vitamins-Minerals (MULTIVITAMIN WOMEN) Tab, Take 1 Tablet by mouth every day., Disp: , Rfl:   •  Home Care Oxygen, Inhale 1 L/min as needed for Shortness of Breath (shortness of breath). patient has own concentrator Oxygen dose range: 1 L/min Respiratory route via: Nasal Cannula  Oxygen supplier: [employer]  , Disp: , Rfl:   •  Acetaminophen 500 MG Cap, Take 1,000 mg by mouth every 6 hours as needed (pain)., Disp: , Rfl:   •  ranolazine (RANEXA) 500 MG TABLET SR 12 HR, Take 500 mg by mouth 2 times a day., Disp: , Rfl:   •  nitroglycerin (NITROSTAT) 0.4 MG SL Tab, Place 0.4 mg under the tongue as needed for Chest Pain., Disp: , Rfl:   •  gabapentin (NEURONTIN) 100 MG Cap, Take 200 mg by mouth at bedtime. Indications: Neuropathic Pain, Disp: , Rfl:   •  furosemide (LASIX) 20 MG Tab, Take 20 mg by mouth 1 time a day as needed (edema, wt gain of 2 pounds or more overnight ). Indications: Edema, Disp: , Rfl:   •  potassium chloride SA (K-DUR) 10 MEQ Tab CR, Take 20 mEq by mouth 1 time a day as needed (supplement). Indications: Low Amount of Potassium in the Blood, take with lasix, Disp: , Rfl:   •  BABY ASPIRIN PO, Take 81 mg by mouth 2 times a day. change in dosage from hospital   Indications: cardiac, Disp: , Rfl:   •  EPINEPHrine (PRIMATENE MIST INH), Inhale 1 Inhalation 1 time a day as needed (sob). Indications: sob, Disp: , Rfl:   •  atorvastatin (LIPITOR) 20 MG Tab, Take 20 mg by mouth every evening., Disp: , Rfl:   •  lisinopril (PRINIVIL) 20 MG Tab, Take 20 mg by mouth 2 times a day. Take 1 tablet by mouth twice a day, Disp: , Rfl:   •  metformin (GLUCOPHAGE) 1000 MG tablet, Take 1,000 mg by mouth 2 times a day, with meals., Disp: , Rfl:   •  Spacer/Aero-Holding Chambers Device, 1 Device by Does not apply route as needed. (Patient taking differently: 1 Device as needed (for use with inhaler).), Disp: 1 Device, Rfl: 0    Labs: Reviewed    Physical Examination:  Vital signs: There were no vitals taken for this visit. There is no height or weight on file to calculate BMI.    Assessment and Plan:    1. DM2  • Pt is tolerating Trulicity 3mg sub q weekly  • PT IS GETTING FROM [facility]  • PT CONTINUES TO USE 9 UNITS OF INSULIN  • A1c has improved from 7.4 to 6.7  • FSBG 176 in the office, but she was sipping her son's sweet soda prior to this visit    - Medication changes:  • none     - Lifestyle changes:  • Continue present management    Follow Up:  3 months    Soco Burns    CC:   MIGUEL ANGEL Tanner

## 2022-06-10 NOTE — TELEPHONE ENCOUNTER
Called patient regarding lab work and echo ordered at previous OV with LS. Spoke to Harpal, who stated that patient has not completed lab work or echo. Harpal stated pt will be getting labs and echo done at [facility]. Confirmed with Harpal upcoming appt date and time.

## 2022-06-14 PROBLEM — E11.69 HYPERLIPIDEMIA ASSOCIATED WITH TYPE 2 DIABETES MELLITUS (HCC): Status: ACTIVE | Noted: 2022-01-01

## 2022-06-14 PROBLEM — G31.9 CEREBRAL ATROPHY (HCC): Status: ACTIVE | Noted: 2022-01-01

## 2022-06-14 PROBLEM — I70.0 ATHEROSCLEROSIS OF AORTA (HCC): Status: ACTIVE | Noted: 2022-01-01

## 2022-06-14 PROBLEM — E11.9 DIABETES MELLITUS WITH COINCIDENT HYPERTENSION (HCC): Status: ACTIVE | Noted: 2022-01-01

## 2022-06-14 PROBLEM — I42.0 ISCHEMIC DILATED CARDIOMYOPATHY (HCC): Status: ACTIVE | Noted: 2022-01-01

## 2022-06-14 PROBLEM — I25.5 ISCHEMIC DILATED CARDIOMYOPATHY (HCC): Status: ACTIVE | Noted: 2022-01-01

## 2022-06-14 PROBLEM — E11.51 TYPE II DIABETES MELLITUS WITH PERIPHERAL CIRCULATORY DISORDER (HCC): Status: ACTIVE | Noted: 2022-01-01

## 2022-06-14 PROBLEM — I77.810 AORTIC ECTASIA, THORACIC (HCC): Status: ACTIVE | Noted: 2022-01-01

## 2022-06-14 PROBLEM — I20.89 STABLE ANGINA (HCC): Status: ACTIVE | Noted: 2022-01-01

## 2022-06-14 PROBLEM — I10 DIABETES MELLITUS WITH COINCIDENT HYPERTENSION (HCC): Status: ACTIVE | Noted: 2022-01-01

## 2022-06-14 PROBLEM — E78.5 HYPERLIPIDEMIA ASSOCIATED WITH TYPE 2 DIABETES MELLITUS (HCC): Status: ACTIVE | Noted: 2022-01-01

## 2022-06-15 NOTE — PATIENT INSTRUCTIONS
-Please get some nonfasting blood work to look at your kidney function and electrolytes in the near future.  I ordered this today.  He can have this drawn at any lab.    -Your lightheadedness and unstable mobility may be related to blood pressure that is too low.    -I want to reduce your metoprolol, follow these instructions:    -Finish up your metoprolol tartrate but cut the pills in half and take half a tablet in the morning and half a tablet in the evening.    -Your next prescription is for metoprolol succinate 25 mg once daily.  This will reduce the overall total daily dose and with the succinate you only have to take it once a day.    -Cut your lisinopril in half from 20 mg down to 10 mg.  You can cut your current tablets in half.  I will send a new prescription.    -If your blood pressure remains consistently under 110 on the top number, you can reduce your lisinopril again down to 5 mg.    -Ranolazine is also called Ranexa.  It is a medication for chronic chest pain.  I find that it only helps 50% of my patients.  You can stop ranolazine for the next 2 weeks and see if your chest pain gets worse.  If you feel the exact same off of the medication, you can stop taking it.  If you find that it does help you then go ahead and restart it.    -Be sure to report back at your next cardiology visit if there were noticing is helping your chest pain.

## 2022-06-15 NOTE — LETTER
[facility]   [address]  [place] [address]  Phone: [phone]  Fax: [phone]              Sinai Huang  1935    Encounter Date: 6/15/2022    Lisa Proctor M.D.          PROGRESS NOTE:  Subjective:   Chief Complaint:   Chief Complaint   Patient presents with   • Coronary Artery Disease     F/V Dx: Coronary artery disease involving native coronary artery of native heart without angina pectoris   • Aortic Atherosclerosis   • Cardiomyopathy (Ischemic)     Sinai Huang is a 86 y.o. female who returns today for CAD/CABG, chronic angina, ischemic cardiomyopathy, chronic systolic heart failure, syncope and collapse, right bundle branch block, bradycardia.    CAD with chronic chest pain.  Has CAD, CABG in [id number] at [facility] in [place].  EF 20 to 25% [facility] by echo November 2021.  Nuclear stress test [facility] October 2021 with mild fixed inferior wall defect, no ischemia.  EF calculated at 32%.    For CAD, remains on aspirin, statin, beta-blocker.  Previous cardiologist thought she was having costochondritis per his note 6/7/2021 by Dr. Zimmerman.  She is on Ranexa, she does not recall when this started.  Has had chest pain requiring 2 nitroglycerin since last visit.  This took care of the chest pain.    Echocardiogram limited at [facility] November 2021 with an EF of 40 to 45%.  Has been on beta-blocker, ACE inhibitor.    Has right bundle branch block, LAFB.    Has chronic HFrEF and cor pulmonale.  [place] heart class III, stage C.  Checking weight at home, gets up 111, typically 110, rarely 112 pounds    Severe secondary pulmonary hypertension, RVSP as high as 70 at [facility] October 2021.  On very low-dose furosemide.    Prior hypertension, now blood pressure is low associated with lightheadedness.  Not checking at home.    Has hyperlipidemia, on statin, moderate intensity.  LDL 67.    Has type 2  diabetes, on Trulicity, Jardiance, juan pablo Burns, clinical pharm.  A1C in controlled.    Chronic oxygen but does not wear it all the time.    Prior breast cancer, right, chemo and radiation.    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     Patient was admitted to [facility] 11/22/2021, I was able to review the discharge diagnosis of the same date.  She was there was syncope and collapse and shoulder pain.  Was treated for hypokalemia and hyponatremia.  Metoprolol dose was reduced because of bradycardia.  Follow-up blood work February 24, 2022, potassium and sodium had normalized.  Creatinine up slightly     Lives in [place].   to Thanh,  66 years.  Accompanied by her son, Harpal.    DATA REVIEWED by me:  ECG (my personal interpretation) 3/9/2022  Sinus, 87, RBBB, RVH/LVH, LAFB    Echocardiogram 6/15/2022-requested is limited  The Ejection Fraction estimate is 40-45%, with wall motion abnormalities  The right ventricular moderately dilated with severely reduced stock function.   The left atrial size is normal.   IVC was normal size and collapse, RAP estimated to be 3 mmHg.  No TR jet for gradient.  Limited echo       Echo 11/20/2021 [facility]  Interpretation Summary   The Ejection Fraction estimate is 40-45%   The right ventricular systolic function is severely reduced.   The left atrial size is normal.   Limited echo     Echo 10/3/2021 [facility]  There is severe global hypokinesis of the left ventricle.   There is posterior wall severe hypokinesis   There is severe inferior wall hypokinesis.   The Ejection Fraction estimate is 20-25%   A variety of Doppler measurements indicate restrictive inflow patterns associated with grade III   diastolic dysfunction and increased left atrial pressures.   The right ventricle is moderate to severely dilated.   The right ventricular systolic function is severely reduced.   The left atrium is severely dilated.   The right atrium is moderately dilated.   There is severe mitral  regurgitation   There is moderate tricuspid regurgitation   Right ventricular systolic pressure is elevated at 65-70 mmHg   Moderate to severe pulmonary hypertension is demonstrated.   There is mild to moderate pulmonic regurgitation   Mild pulmonary artery dilation.   Small right pleural effusion.     [facility]  Interpretation Summary   There is mild concentric left ventricular hypertrophy. Inferior wall infarct noted. Left ventricular   systolic function is mildly reduced. The Ejection Fraction estimate is 40-45%   There is moderate mitral regurgitation   Right ventricular systolic pressure is elevated at 55-60 mmHg     Nuclear stress test 10/3/2021 [facility]  •  Left ventricular perfusion is abnormal. Mild fixed defect in the   inferior wall c/w previous infarction no ischemia is noted   •  Abnormal LV function (32%). Hypokinetic inferior  and septal walls   •  No stress ECG evidence of ischemia.   •  No recovery ECG evidence of ischemia.      CT chest 10/3/2021  1.  No pulmonary emboli, thoracic aortic aneurysm or arterial dissection.   2.  Prior sternotomy.  Moderate cardiomegaly with pulmonary vascular congestion,   diffuse groundglass interstitial pattern throughout both lungs with enlarging   posterior layering pleural effusions consistent with a component of CHF.   Compressive atelectasis noted within the dependent portions of both lungs.   Concomitant pneumonia is not excluded.   3.  Suspect multinodular goiter.   3.  Mild left adrenal gland enlargement indeterminate between adenomatous   involvement or mild hyperplasia, unchanged.       CT head 11/20/2021  No acute abnormalities    Most recent labs:     2-24-22 sodium 136, potassium 4.9, LFTs normal, BUN 28, creatinine 1.38, total cholesterol 132, triglycerides 141, LDL 67, HDL 48 hemoglobin 13.7, platelets 165, TSH 0.51, free T4 0.87  Lab Results   Component Value Date/Time    WBC 5.5 04/12/2021 08:45 AM    HEMOGLOBIN  14.2 04/12/2021 08:45 AM    HEMATOCRIT 41.7 04/12/2021 08:45 AM    MCV 91.2 04/12/2021 08:45 AM      Lab Results   Component Value Date/Time    SODIUM 133 (L) 04/12/2021 08:45 AM    POTASSIUM 4.2 04/12/2021 08:45 AM    CHLORIDE 97 04/12/2021 08:45 AM    CO2 25 04/12/2021 08:45 AM    GLUCOSE 154 (H) 04/12/2021 08:45 AM    BUN 8 04/12/2021 08:45 AM    CREATININE 0.84 04/12/2021 08:45 AM      Lab Results   Component Value Date/Time    ASTSGOT 18 04/12/2021 08:45 AM    ALTSGPT 31 04/12/2021 08:45 AM    ALBUMIN 4.3 04/12/2021 08:45 AM      No results found for: CHOLSTRLTOT, LDL, HDL, TRIGLYCERIDE  No results for input(s): NTPROBNP, TROPONINT in the last 72 hours.      Past Medical History:   Diagnosis Date   • Closed fracture of rib of right side 11/22/2021   • Recurrent falls 3/26/2021   • Type 2 diabetes mellitus without complication (HCC) 11/20/2020     History reviewed. No pertinent surgical history.  History reviewed. No pertinent family history.  Social History     Socioeconomic History   • Marital status:      Spouse name: Not on file   • Number of children: Not on file   • Years of education: Not on file   • Highest education level: Not on file   Occupational History   • Not on file   Tobacco Use   • Smoking status: Never Smoker   • Smokeless tobacco: Never Used   Vaping Use   • Vaping Use: Never used   Substance and Sexual Activity   • Alcohol use: Never   • Drug use: Never   • Sexual activity: Not Currently   Other Topics Concern   • Not on file   Social History Narrative   • Not on file     Social Determinants of Health     Financial Resource Strain: Not on file   Food Insecurity: Not on file   Transportation Needs: Not on file   Physical Activity: Not on file   Stress: Not on file   Social Connections: Not on file   Intimate Partner Violence: Not on file   Housing Stability: Not on file     No Known Allergies    Current Outpatient Medications   Medication Sig Dispense Refill   • pregabalin (LYRICA) 50  MG capsule Take 50 mg by mouth. Indications: Takes once a week     • HYDROcodone-acetaminophen (NORCO) 5-325 MG Tab per tablet Take 1 Tablet by mouth every 24 hours as needed. FOR PAIN     • metoprolol SR (TOPROL XL) 25 MG TABLET SR 24 HR Take 1 Tablet by mouth every day. 90 Tablet 7   • lisinopril (PRINIVIL) 10 MG Tab Take 1 Tablet by mouth every day. 90 Tablet 7   • cyclobenzaprine (FLEXERIL) 5 mg tablet Take 1 Tablet by mouth 1 time a day as needed for Muscle Spasms. 30 Tablet 1   • Dulaglutide (TRULICITY) 3 MG/0.5ML Solution Pen-injector Inject 1 Each under the skin every 7 days. 8 mL 3   • VITAMIN D PO Take  by mouth.     • Calcium Carbonate (CALCIUM 500 PO) Take  by mouth.     • lidocaine (LIDODERM) 5 % Patch      • albuterol 108 (90 Base) MCG/ACT Aero Soln inhalation aerosol Inhale 2 Puffs every 6 hours as needed.     • meclizine (ANTIVERT) 12.5 MG Tab Take 12.5 mg by mouth 1 time a day as needed for Dizziness.     • Pyridoxine HCl (VITAMIN B6) 100 MG Tab Take 100 mg by mouth every day.     • Multiple Vitamins-Minerals (MULTIVITAMIN WOMEN) Tab Take 1 Tablet by mouth every day.     • Home Care Oxygen Inhale 1 L/min as needed for Shortness of Breath (shortness of breath). patient has own concentrator  Oxygen dose range: 1 L/min  Respiratory route via: Nasal Cannula   Oxygen supplier: [employer]         • Acetaminophen 500 MG Cap Take 1,000 mg by mouth every 6 hours as needed (pain).     • ranolazine (RANEXA) 500 MG TABLET SR 12 HR Take 500 mg by mouth 2 times a day.     • nitroglycerin (NITROSTAT) 0.4 MG SL Tab Place 0.4 mg under the tongue as needed for Chest Pain.     • gabapentin (NEURONTIN) 100 MG Cap Take 200 mg by mouth at bedtime. Indications: Neuropathic Pain     • furosemide (LASIX) 20 MG Tab Take 20 mg by mouth 1 time a day as needed (edema, wt gain of 2 pounds or more overnight ). Indications: Edema     • potassium chloride SA (K-DUR) 10 MEQ Tab CR Take 20 mEq by mouth 1 time a day as needed  (supplement). Indications: Low Amount of Potassium in the Blood, take with lasix     • BABY ASPIRIN PO Take 81 mg by mouth 2 times a day. change in dosage from hospital  Indications: cardiac     • EPINEPHrine (PRIMATENE MIST INH) Inhale 1 Inhalation 1 time a day as needed (sob). Indications: sob     • atorvastatin (LIPITOR) 20 MG Tab Take 20 mg by mouth every evening.     • metformin (GLUCOPHAGE) 1000 MG tablet Take 1,000 mg by mouth 2 times a day, with meals.     • Spacer/Aero-Holding Chambers Device 1 Device by Does not apply route as needed. (Patient taking differently: 1 Device as needed (for use with inhaler).) 1 Device 0     No current facility-administered medications for this visit.       ROS    All others systems reviewed and negative.     Objective:     /52 (BP Location: Left arm, Patient Position: Sitting, BP Cuff Size: Child)   Pulse 80   Resp 16   Ht 1.524 m (5')   Wt 48.6 kg (107 lb 2.3 oz)   SpO2 99%  Body mass index is 20.93 kg/m².    General: No acute distress. Thin  HEENT: EOM grossly intact, no scleral icterus, no pharyngeal erythema.   Neck:  + JVD at 90, no bruits, trachea midline  CVS: RRR. Normal S1, S2. No khai M/R/G. No LE edema.  2+ radial pulses, 2+ PT pulses  Resp: CTAB. No wheezing or crackles/rhonchi. Normal respiratory effort.  Abdomen: Soft, NT, no khai hepatomegaly.  MSK/Ext: No clubbing or cyanosis.  Skin: Warm and dry, no rashes.  Neurological: CN III-XII grossly intact. No focal deficits. Rolling walker  Psych: A&O x 3, appropriate affect, good judgement    Physical exam performed today and unchanged, except what is noted, compared to 3-9-22      Assessment:     1. Coronary artery disease involving native coronary artery of native heart without angina pectoris     2. Hx of CABG     3. History of breast cancer     4. Pulmonary hypertension (HCC)     5. Type 2 diabetes mellitus with hyperglycemia, with long-term current use of insulin (HCC)     6. Syncope and collapse      7. Ischemic cardiomyopathy     8. Bradycardia     9. Right bundle branch block (RBBB) with left anterior fascicular block (LAFB)     10. LVH (left ventricular hypertrophy)     11. Chronic HFrEF (heart failure with reduced ejection fraction) (HCC)     12. RVH (right ventricular hypertrophy)     13. Essential hypertension         Medical Decision Making:  Today's Assessment / Status / Plan:     -Has chronic HFpEF and cor pulmonale, RV dysfunction, cont lasix  -No plans for targeted therapy for severe secondary pulmonary hypertension, just Lasix  -Blood pressure actually looks low some going to reduce her medications  -Low EF medications include ACE inhibitor and metoprolol but again I am lowering the doses of both  -For CAD, continue secondary prevention aspirin, statin, beta-blocker  -In terms of chronic stable angina, nitro has been effective.  She cannot recall if Ranexa is helpful.  I will have her trial off of it for 2 weeks and if she feels no different she can stay off of it.  If it turns out it is helping, she will let us know.  -LDL to goal  -On good medications for type 2 diabetes  -Nonfasting BMP to follow-up on kidney function  -RTC 6 months APN, then MD 1 year    Written instructions given today:      -Please get some nonfasting blood work to look at your kidney function and electrolytes in the near future.  I ordered this today.  He can have this drawn at any lab.    -Your lightheadedness and unstable mobility may be related to blood pressure that is too low.    -I want to reduce your metoprolol, follow these instructions:    -Finish up your metoprolol tartrate but cut the pills in half and take half a tablet in the morning and half a tablet in the evening.    -Your next prescription is for metoprolol succinate 25 mg once daily.  This will reduce the overall total daily dose and with the succinate you only have to take it once a day.    -Cut your lisinopril in half from 20 mg down to 10 mg.  You can  cut your current tablets in half.  I will send a new prescription.    -If your blood pressure remains consistently under 110 on the top number, you can reduce your lisinopril again down to 5 mg.    -Ranolazine is also called Ranexa.  It is a medication for chronic chest pain.  I find that it only helps 50% of my patients.  You can stop ranolazine for the next 2 weeks and see if your chest pain gets worse.  If you feel the exact same off of the medication, you can stop taking it.  If you find that it does help you then go ahead and restart it.    -Be sure to report back at your next cardiology visit if there were noticing is helping your chest pain.    Return in about 6 months (around 12/15/2022).    It is my pleasure to participate in the care of Ms. Huang.  Please do not hesitate to contact me with questions or concerns.    Lisa Proctor MD, [facility]  Cardiologist [facility]    Please note that this dictation was created using voice recognition software. I have made every reasonable attempt to correct obvious errors, but it is possible there are errors of grammar and possibly content that I did not discover before finalizing the note.          No Recipients

## 2022-06-28 NOTE — TELEPHONE ENCOUNTER
Called patient regarding lab work and echo ordered at previous OV with LS. Patient stated she has not gotten lab work or echo done yet. Confirmed upcoming appt date and time with patient.    inappropriate. If there are any questions or concerns please feel free to contact the dictating provider for clarification.        Marlene Guzmán MD  06/28/22 2005

## 2022-08-25 PROBLEM — Z71.89 ENCOUNTER FOR HOSPICE CARE DISCUSSION: Status: ACTIVE | Noted: 2022-01-01

## 2022-08-26 NOTE — ASSESSMENT & PLAN NOTE
Patient and  honorio Perez are here today to discuss possible admission to hospice.  Patient does have extensive health issues and recently has had discussion with family and her  that she does not enjoy taking medications or going to the doctors.  She is tired of being in pain, falling, taking medications that she would like to stop.  She states that she recently followed up with a PA yesterday thinking they were going to come talk to her about hospice however they were doing a transitional care appointment after she was discharged from the hospital.  Patient is alert and oriented and is able to make decisions.  We had an extensive conversation with patient and family about what hospice meant and options to discuss this further with hospice and palliative care which patient and family are agreeable to.

## 2022-08-26 NOTE — PROGRESS NOTES
CC:  Chief Complaint   Patient presents with    Referral Needed     Comfort care; Acupuncture therapy       HISTORY OF PRESENT ILLNESS: Patient is a 87 y.o. female established patient presenting     Encounter for hospice care discussion  Patient and  Chris, honorio are here today to discuss possible admission to hospice.  Patient does have extensive health issues and recently has had discussion with family and her  that she does not enjoy taking medications or going to the doctors.  She is tired of being in pain, falling, taking medications that she would like to stop.  She states that she recently followed up with a PA yesterday thinking they were going to come talk to her about hospice however they were doing a transitional care appointment after she was discharged from the hospital.  Patient is alert and oriented and is able to make decisions.  We had an extensive conversation with patient and family about what hospice meant and options to discuss this further with hospice and palliative care which patient and family are agreeable to.         Allergies:Patient has no known allergies.    Current Outpatient Medications   Medication Sig Dispense Refill    ranolazine (RANEXA) 500 MG TABLET SR 12 HR Take 1 Tablet by mouth 2 times a day.      magnesium oxide (MAG-OX) 400 MG Tab tablet TAKE 1 (ONE) TABLET (400 MG) BY MOUTH ONCE DAILY FOR 30 DAYS      cyclobenzaprine (FLEXERIL) 5 mg tablet TAKE 1 TABLET BY MOUTH 1 TIME A DAY AS NEEDED FOR MUSCLE SPASMS. 30 Tablet 1    lisinopril (PRINIVIL) 10 MG Tab Take 1 Tablet by mouth every day. (Patient taking differently: Take 20 mg by mouth 2 times a day.) 100 Tablet 7    pregabalin (LYRICA) 50 MG capsule Take 50 mg by mouth. Indications: Takes once a week      HYDROcodone-acetaminophen (NORCO) 5-325 MG Tab per tablet Take 1 Tablet by mouth every 24 hours as needed. FOR PAIN      metoprolol SR (TOPROL XL) 25 MG TABLET SR 24 HR Take 1 Tablet by mouth every day. 90  Tablet 7    Dulaglutide (TRULICITY) 3 MG/0.5ML Solution Pen-injector Inject 1 Each under the skin every 7 days. 8 mL 3    VITAMIN D PO Take  by mouth.      Calcium Carbonate (CALCIUM 500 PO) Take  by mouth.      lidocaine (LIDODERM) 5 % Patch       albuterol 108 (90 Base) MCG/ACT Aero Soln inhalation aerosol Inhale 2 Puffs every 6 hours as needed.      meclizine (ANTIVERT) 12.5 MG Tab Take 12.5 mg by mouth 1 time a day as needed for Dizziness.      Pyridoxine HCl (VITAMIN B6) 100 MG Tab Take 100 mg by mouth every day.      Multiple Vitamins-Minerals (MULTIVITAMIN WOMEN) Tab Take 1 Tablet by mouth every day.      Home Care Oxygen Inhale 1 L/min as needed for Shortness of Breath (shortness of breath). patient has own concentrator  Oxygen dose range: 1 L/min  Respiratory route via: Nasal Cannula   Oxygen supplier: [employer]          Acetaminophen 500 MG Cap Take 1,000 mg by mouth every 6 hours as needed (pain).      ranolazine (RANEXA) 500 MG TABLET SR 12 HR Take 500 mg by mouth 2 times a day.      nitroglycerin (NITROSTAT) 0.4 MG SL Tab Place 0.4 mg under the tongue as needed for Chest Pain.      gabapentin (NEURONTIN) 100 MG Cap Take 200 mg by mouth at bedtime. Indications: Neuropathic Pain      furosemide (LASIX) 20 MG Tab Take 20 mg by mouth 1 time a day as needed (edema, wt gain of 2 pounds or more overnight ). Indications: Edema      potassium chloride SA (K-DUR) 10 MEQ Tab CR Take 10 mEq by mouth 1 time a day as needed (supplement). Indications: Low Amount of Potassium in the Blood, take with lasix      BABY ASPIRIN PO Take 81 mg by mouth 2 times a day. change in dosage from hospital  Indications: cardiac      atorvastatin (LIPITOR) 20 MG Tab Take 20 mg by mouth every evening.      metformin (GLUCOPHAGE) 1000 MG tablet Take 1,000 mg by mouth 2 times a day, with meals.      Spacer/Aero-Holding Chambers Device 1 Device by Does not apply route as needed. (Patient taking differently: 1 Device as needed (for  use with inhaler).) 1 Device 0    pregabalin (LYRICA) 75 MG Cap TAKE 1 CAPSULE BY MOUTH TWICE A DAY FOR 30 DAYS. G89.4 (Patient not taking: Reported on 8/25/2022)      EPINEPHrine (PRIMATENE MIST INH) Inhale 1 Inhalation 1 time a day as needed (sob). Indications: sob (Patient not taking: Reported on 8/25/2022)       No current facility-administered medications for this visit.     Past Medical History:   Diagnosis Date    Closed fracture of rib of right side 11/22/2021    Recurrent falls 3/26/2021    Type 2 diabetes mellitus without complication (HCC) 11/20/2020     History reviewed. No pertinent surgical history.  Social History     Tobacco Use    Smoking status: Never    Smokeless tobacco: Never   Vaping Use    Vaping Use: Never used   Substance Use Topics    Alcohol use: Never    Drug use: Never     Social History     Social History Narrative    Not on file       History reviewed. No pertinent family history.     ROS  See HPI      - NOTE: All other systems reviewed and are negative, except as in HPI.      Exam:    /72 (BP Location: Left arm, Patient Position: Sitting, BP Cuff Size: Adult)   Pulse 84   Temp 37.1 °C (98.8 °F) (Temporal)   Resp 14   Ht 1.524 m (5')   Wt 48.5 kg (107 lb)   SpO2 98%  Body mass index is 20.9 kg/m².    General: Alert, pleasant, NAD  EYES:   PERRL, EOMI, no icterus or pallor.  Conjunctivae and lids normal.   HENT:  Normocephalic.  External ears normal.   Heart: Regular rate and rhythm.    Respiratory: Normal respiratory effort.  Clear to auscultation bilaterally.  Skin: Warm, dry, no rashes.  Multiple bruises noted from bumping into things and old healing scabs  Musculoskeletal: Gait is front wheel walker.  Moves all extremities well.    Extremities: No clubbing, cyanosis or edema noted.   Neurological: No tremors, sensation grossly intact, tone/strength normal, gait is with front wheel walker  Psych:  Affect/mood is normal, judgement is good, memory is intact, grooming is  appropriate.      Assessment/Plan:  1. Coronary artery disease involving native heart, unspecified vessel or lesion type, unspecified whether angina present  - Referral to Palliative Care    2. Chronic systolic heart failure (HCC)  - Referral to Palliative Care    3. Dependence on supplemental oxygen  - Referral to Palliative Care    4. Heart failure with reduced ejection fraction, NYHA class III (HCC)  - Referral to Palliative Care    5. Pulmonary hypertension (HCC)  - Referral to Palliative Care    6. Ischemic dilated cardiomyopathy (HCC)  - Referral to Palliative Care       Discussed with patient possible alternative diagnoses, patient is to take all medications as prescribed.     If symptoms persist FU w/PCP, if symptoms worsen go to emergency room.     If experiencing any side effects from prescribed medications reports to the office immediately or go to emergency room.    Reviewed indication, dosage, usage and potential adverse effects of prescribed medications.     Reviewed risks and benefits of treatment plan. Patient verbalizes understanding of all instruction and verbally agrees to plan.      Return for Pending result of palliative care referral.      My total time spent caring for the patient on the day of the encounter was 30 minutes.   This does not include time spent on separately billable procedures/tests.     Please note that this dictation was created using voice recognition software. I have made every reasonable attempt to correct obvious errors, but I expect that there are errors of grammar and possibly content that I did not discover before finalizing the note.

## 2022-08-29 PROBLEM — R53.81 DEBILITY: Status: ACTIVE | Noted: 2022-01-01

## 2022-09-01 NOTE — CASE COMMUNICATION
PRIMARY DIAGNOSIS: HF, multiple falls, wound care, o2 dependence, debility  SKILLED NEED: Health assessment, medication management, pain prevention education, fall prevention education, mobility, strength and home safety, wound care  NURSING FREQUENCY: 1w1, 2w5, 3prn  ZIP CODE: [id number]  DISCIPLINES ORDERED: PT SN  INSURANCE: [facility]  CERTIFICATION PERIOD: 9/1-10/30  SPECIAL CONSIDERATION: NONE    Case communication to HH attending provi pito and P amb anti    Opened patient to [facility] medication reconciliation process done. Medication list left in home care folder. See MAR for drug allergy interactions. There are NO  major drug to drug interactions.  The best contact phone number is  and SON/PT manages the medications.

## 2022-09-01 NOTE — CASE COMMUNICATION
Jameel Chacko- just wanted to let you know- [facility] fell yesterday prior to being started on HH- her  was unable to get her up and had to call EMS. They got her up and into bed but she/the family refused transfer to the hospital. Pt has a skin tear on her lt elbow and a sore tailbone. I explained the importance of going to the hospital for imaging if she gets worse.     Also asked by the pt/son to help them get an order for a portable o 2 tank for when pt leaves the house or the power goes out- they are hoping she could get one that she can wear on her shoulder like a purse (of course that probably wont work if the power goes out). Can you order that for her?    Also spoke about her depression- she is anxious and depressed and feels like she needs to be put on some medication to help. I just wanted you to be aware so you can talk to her about it at her next appt.       Thanks- Crystal

## 2022-09-02 PROBLEM — R42 DIZZINESS: Status: ACTIVE | Noted: 2022-01-01
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
## 2022-09-02 NOTE — CASE COMMUNICATION
noted  ----- Message -----  From: Lupe Barker R.N.  Sent: 9/1/2022   4:07 PM PDT  To: Nakita Valdivia R.N., Carolina Bah, *        PRIMARY DIAGNOSIS: HF, multiple falls, wound care, o2 dependence, debility  SKILLED NEED: Health assessment, medication management, pain prevention education, fall prevention education, mobility, strength and home safety, wound care  NURSING FREQUENCY: 1w1, 2w5, 3prn  ZIP CODE: [id number]  DISCIPLINES OR DERED: PT SN  INSURANCE: [facility]  CERTIFICATION PERIOD: 9/1-10/30  SPECIAL CONSIDERATION: NONE    Case communication to HH attending provider and P amb anti    Opened patient to [facility] medication reconciliation process done. Medication list left in home care folder. See MAR for drug allergy interactions. There are NO  major drug to drug interactions.  The best contact phone number is  and SON/PT manages the medicat ions.

## 2022-09-02 NOTE — ASSESSMENT & PLAN NOTE
Chronic and stable condition   Takes meclizine as needed for dizziness and reduce falls  Since she has been out of it for a few days family has noticed and increase in falls since being out.

## 2022-09-02 NOTE — PROGRESS NOTES
[facility]    Received referral from [facility] to review patient's medication list.    Med list reviewed and reconciled.  No clinically significant DDI identified.  Allergies reviewed.    Shanelle Abreu, KaushikD

## 2022-09-03 NOTE — ASSESSMENT & PLAN NOTE
Acute uncomplicated condition.  Patient recently had a ground-level fall which resulted in a skin tear to her left elbow.  She was seen by home health at that time and had site cleaned and bandage applied  Son notes that he believes that she is more confused and unstable on her feet  She was recently seen by home health and palliative care to discuss possible hospice however after further discussion with patient she is not interested and would like to get stronger.  Son notes that she has possibly not been taking her Lasix as her lower extremities are edematous and she seemed to be confused yesterday.  Patient denies any type of dysuria,, chills, fever, back pain

## 2022-09-03 NOTE — PROGRESS NOTES
CC:  Chief Complaint   Patient presents with    Memory Loss    Medication Refill     meclizine    Foot Swelling     Foot/ankles x1wk       HISTORY OF PRESENT ILLNESS: Patient is a 87 y.o. female established patient presenting for    Dizziness  Chronic and stable condition   Takes meclizine as needed for dizziness and reduce falls  Since she has been out of it for a few days family has noticed and increase in falls since being out.     Fall from ground level  Acute uncomplicated condition.  Patient recently had a ground-level fall which resulted in a skin tear to her left elbow.  She was seen by home health at that time and had site cleaned and bandage applied  Son notes that he believes that she is more confused and unstable on her feet  She was recently seen by home health and palliative care to discuss possible hospice however after further discussion with patient she is not interested and would like to get stronger.  Son notes that she has possibly not been taking her Lasix as her lower extremities are edematous and she seemed to be confused yesterday.  Patient denies any type of dysuria,, chills, fever, back pain       Allergies:Patient has no known allergies.    Current Outpatient Medications   Medication Sig Dispense Refill    meclizine (ANTIVERT) 12.5 MG Tab Take 1 Tablet by mouth 1 time a day as needed for Dizziness. 90 Tablet 0    potassium chloride SA (K-DUR) 10 MEQ Tab CR Take 1 Tablet by mouth 1 time a day as needed (supplement). Indications: Low Amount of Potassium in the Blood, take with lasix 90 Tablet 0    furosemide (LASIX) 20 MG Tab Take 1 Tablet by mouth 1 time a day as needed (edema, wt gain of 2 pounds or more overnight ). Indications: Edema 60 Tablet 0    insulin glargine (LANTUS) 100 UNIT/ML Solution Inject 15 Units under the skin every day.      ranolazine (RANEXA) 500 MG TABLET SR 12 HR Take 1 Tablet by mouth 2 times a day.      magnesium oxide (MAG-OX) 400 MG Tab tablet TAKE 1 (ONE) TABLET  (400 MG) BY MOUTH ONCE DAILY FOR 30 DAYS      cyclobenzaprine (FLEXERIL) 5 mg tablet TAKE 1 TABLET BY MOUTH 1 TIME A DAY AS NEEDED FOR MUSCLE SPASMS. 30 Tablet 1    lisinopril (PRINIVIL) 10 MG Tab Take 1 Tablet by mouth every day. (Patient taking differently: Take 20 mg by mouth 2 times a day.) 100 Tablet 7    pregabalin (LYRICA) 50 MG capsule Take 50 mg by mouth. Indications: Takes once a week      metoprolol SR (TOPROL XL) 25 MG TABLET SR 24 HR Take 1 Tablet by mouth every day. (Patient taking differently: Take 12.5 mg by mouth every day.) 90 Tablet 7    Dulaglutide (TRULICITY) 3 MG/0.5ML Solution Pen-injector Inject 1 Each under the skin every 7 days. 8 mL 3    VITAMIN D PO Take  by mouth.      Calcium Carbonate (CALCIUM 500 PO) Take  by mouth.      lidocaine (LIDODERM) 5 % Patch       albuterol 108 (90 Base) MCG/ACT Aero Soln inhalation aerosol Inhale 2 Puffs every 6 hours as needed for Shortness of Breath.      Pyridoxine HCl (VITAMIN B6) 100 MG Tab Take 100 mg by mouth every day.      Multiple Vitamins-Minerals (MULTIVITAMIN WOMEN) Tab Take 1 Tablet by mouth every day.      Home Care Oxygen Inhale 1 L/min as needed for Shortness of Breath (shortness of breath). patient has own concentrator  Oxygen dose range: 1 L/min  Respiratory route via: Nasal Cannula   Oxygen supplier: [employer]          Acetaminophen 500 MG Cap Take 1,000 mg by mouth every 6 hours as needed (pain).      ranolazine (RANEXA) 500 MG TABLET SR 12 HR Take 500 mg by mouth 2 times a day.      nitroglycerin (NITROSTAT) 0.4 MG SL Tab Place 0.4 mg under the tongue as needed for Chest Pain.      gabapentin (NEURONTIN) 100 MG Cap Take 200 mg by mouth at bedtime. Indications: Neuropathic Pain      BABY ASPIRIN PO Take 81 mg by mouth 2 times a day. change in dosage from hospital  pt takes from time to time  Indications: cardiac      atorvastatin (LIPITOR) 20 MG Tab Take 20 mg by mouth every evening.      metformin (GLUCOPHAGE) 1000 MG tablet  Take 1,000 mg by mouth 2 times a day, with meals.      Spacer/Aero-Holding Chambers Device 1 Device by Does not apply route as needed. (Patient taking differently: 1 Device as needed (for use with inhaler).) 1 Device 0    pregabalin (LYRICA) 75 MG Cap TAKE 1 CAPSULE BY MOUTH TWICE A DAY FOR 30 DAYS. G89.4 (Patient not taking: No sig reported)      HYDROcodone-acetaminophen (NORCO) 5-325 MG Tab per tablet Take 1 Tablet by mouth every 24 hours as needed. FOR PAIN (Patient not taking: Reported on 9/2/2022)      EPINEPHrine (PRIMATENE MIST INH) Inhale 1 Inhalation 1 time a day as needed (sob). Indications: sob (Patient not taking: No sig reported)       No current facility-administered medications for this visit.     Past Medical History:   Diagnosis Date    Closed fracture of rib of right side 11/22/2021    Recurrent falls 3/26/2021    Type 2 diabetes mellitus without complication (HCC) 11/20/2020     History reviewed. No pertinent surgical history.  Social History     Tobacco Use    Smoking status: Never    Smokeless tobacco: Never   Vaping Use    Vaping Use: Never used   Substance Use Topics    Alcohol use: Never    Drug use: Never     Social History     Social History Narrative    Not on file       History reviewed. No pertinent family history.     ROS  See HPI      - NOTE: All other systems reviewed and are negative, except as in HPI.      Exam:    Pulse 90   Temp 36.2 °C (97.1 °F) (Temporal)   Resp 16   Ht 1.524 m (5')   Wt 52.3 kg (115 lb 3.2 oz)   SpO2 96%  Body mass index is 22.5 kg/m².    General: Alert, pleasant, NAD  EYES:   PERRL, EOMI, no icterus or pallor.  Conjunctivae and lids normal.   HENT:  Normocephalic.  External ears normal.   Heart: Regular rate and rhythm.  S1 and S2 normal.  No murmurs appreciated.  Respiratory: Normal respiratory effort.  Clear to auscultation bilaterally.  Skin: Warm, dry, no rashes.  Musculoskeletal:   Moves all extremities well.    Extremities: No clubbing, cyanosis or  bilateral lower extremity edema noted.   Neurological: No tremors, sensation grossly intact, tone/strength normal  Psych:  Affect/mood is normal, judgement is good, memory is intact, grooming is appropriate.      Assessment/Plan:    1. Dizziness  - meclizine (ANTIVERT) 12.5 MG Tab; Take 1 Tablet by mouth 1 time a day as needed for Dizziness.  Dispense: 90 Tablet; Refill: 0  - Basic Metabolic Panel; Future  - URINALYSIS,CULTURE IF INDICATED; Future    2. Heart failure with reduced ejection fraction, NYHA class III (HCC)  - potassium chloride SA (K-DUR) 10 MEQ Tab CR; Take 1 Tablet by mouth 1 time a day as needed (supplement). Indications: Low Amount of Potassium in the Blood, take with lasix  Dispense: 90 Tablet; Refill: 0  - furosemide (LASIX) 20 MG Tab; Take 1 Tablet by mouth 1 time a day as needed (edema, wt gain of 2 pounds or more overnight ). Indications: Edema  Dispense: 60 Tablet; Refill: 0  - Basic Metabolic Panel; Future    3. Confusion  - Basic Metabolic Panel; Future  - URINALYSIS,CULTURE IF INDICATED; Future    4. Fall from ground level  - URINALYSIS,CULTURE IF INDICATED; Future     Discussed with son and will have home health complete UA as well as labs at next visit as patient was unable to complete urine sample today  Discussed with patient possible alternative diagnoses, patient is to take all medications as prescribed.     If symptoms persist FU w/PCP, if symptoms worsen go to emergency room.     If experiencing any side effects from prescribed medications reports to the office immediately or go to emergency room.    Reviewed indication, dosage, usage and potential adverse effects of prescribed medications.     Reviewed risks and benefits of treatment plan. Patient verbalizes understanding of all instruction and verbally agrees to plan.      Return in about 2 weeks (around 9/16/2022) for Follow-up 1 week for confusion and labs.      Please note that this dictation was created using voice recognition  software. I have made every reasonable attempt to correct obvious errors, but I expect that there are errors of grammar and possibly content that I did not discover before finalizing the note.

## 2022-09-06 NOTE — HOME HEALTH
History pertinent to today's visit: Unspecified systolic (congestive) heart failure , Skilled need: assess vs and all body systems,monitor and teach per poc, wound  care Pt/Cg response to the services provided:HEART FAILURE ZONE,  Biometrics within parameters, Symptoms are under control / no change in symptoms:, - No new shortness of breath - No new weight gain of more than 2 pounds  - No new swelling or the feet, ankles, legs or stomach, - No new chest pain, Response: Continue on the same medication/treatment regimen, wound care done and tolerated it well, no s/s infections noted,, assisted pt in am care, pt states I am too weak to get cleaned up this morning, son lives in [facility] and he checks on pt every 2 days, assist as needed with adl's, will get HHA to assist with showers, no falls reported, reinforced falls prevention strategies, verbalized understanding, answered all questions and concerns, vss,  Plan for the next visit: assess vs and all body systems, monitor and teach per poc. Case communication: as needed

## 2022-09-06 NOTE — Clinical Note
Pt weight gain 5.8lbs since 9/5/2022 is outside of normal parameters and is to be reported to MD and CM. Pt is otherwise asymptomatic. All other vitals are WFL.  Pt able to take furosemid and potassium during session.

## 2022-09-07 NOTE — CASE COMMUNICATION
noted  ----- Message -----  From: Amaya Rock, PT  Sent: 9/6/2022   2:07 PM PDT  To: Joi Hebert R.N., Monserrat Ley, OT, *      Pt weight gain 5.8lbs since 9/5/2022 is outside of normal parameters and is to be reported to MD and CM. Pt is otherwise asymptomatic. All other vitals are WFL.  Pt able to take furosemid and potassium during session.

## 2022-09-08 NOTE — PROGRESS NOTES
Tresiba samples provided  Soco Burns, Clinical Pharmacist, CDE, CACP  Managed Care Pharmacist for [facility] and [facility]

## 2022-09-08 NOTE — PROGRESS NOTES
Patient Consult Note    TIME IN: 2:29  TIME OUT: 3:05    Primary care physician: MIGUEL ANGEL Tanner    Reason for consult: Management of Controlled Type 2 Diabetes    HPI:  Sinai Huang is a 87 y.o. old patient who comes in today for evaluation of above stated problem.    Most Recent HbA1c and POCT glucose:   Lab Results   Component Value Date/Time    HBA1C 6.6 (H) 07/05/2022 04:16 PM    HBA1C 6.7 (A) 06/09/2022 02:29 PM      Recent Labs     09/08/22  1433   POCGLUCOSE 175*       Most Recent Scr:  Lab Results   Component Value Date/Time    CREATININE 0.99 07/06/2022 05:45 AM    CREATININE 0.84 04/12/2021 08:45 AM        Current Diabetes Medication Regimen  Metformin: IR/ER 1000mg po bid   GLP-1 Agent: Trulicity 3mg sub q weekly   SGLT-2 Inhibitor: not affordable, pt did not qualify for PAP       Basal Insulin: lantus 5-10units      Previous Diabetes Medications and Reason for Discontinuation       Pt has home glucometer and proper testing technique - yes    Pt reports blood sugars:     Other times: pt does not present with FSBG log    Hypoglycemia awareness - yes  Nocturnal hypoglycemia- none  Hypoglycemia:  None    Pt's treatment of Hypoglycemia - n/a  - 15:15 Rule    Current Exercise - none  Exercise Goal - pt would benefit from 30 minutes of dialy dedicated walking    Dietary - common adult    Foot Exam:  Monofilament exam - declines  Monofilament testing with a 10 gram force: sensation intact: decreased bilaterally.    Visual Inspection: Feet without maceration, ulcers, fissures.  Feet dry.  Pedal pulses: intact bilaterally    Preventative Management  BP regimen (ACE/ARB) - lisionpril 20mg po daily  ASA - 81mg  Statin - atorva 20  Last Retinal Scan - reminded pt & son  Last Foot Exam -    Last A1c -   Lab Results   Component Value Date/Time    HBA1C 6.6 (H) 07/05/2022 04:16 PM    HBA1C 6.7 (A) 06/09/2022 02:29 PM      Last Microalbuminuria -       updated caregaps    Past Medical History:  Patient  Active Problem List    Diagnosis Date Noted    Dizziness 09/02/2022    Fall from ground level 09/02/2022    Debility 08/29/2022    Encounter for hospice care discussion 08/25/2022    BMI 20.0-20.9, adult 06/14/2022    Diabetes mellitus with coincident hypertension (HCC) 06/14/2022    Stable angina (HCC) 06/14/2022    Cerebral atrophy (HCC) 06/14/2022    Ischemic dilated cardiomyopathy (HCC) 06/14/2022    Hyperlipidemia associated with type 2 diabetes mellitus (HCC) 06/14/2022    Type II diabetes mellitus with peripheral circulatory disorder (HCC) 06/14/2022    Aortic ectasia, thoracic (HCC) 06/14/2022    Atherosclerosis of aorta ([facility]) 06/14/2022    Obstructive sleep apnea 01/12/2022    Chronic systolic heart failure ([facility]) 11/22/2021    Dependence on supplemental oxygen 11/22/2021    Hx of CABG 11/22/2021    Pulmonary hypertension (HCC) 11/22/2021    Sciatica 11/22/2021    Uncontrolled type 2 diabetes mellitus with hyperglycemia ([facility]) 11/22/2021    History of breast cancer 03/26/2021    CAD (coronary artery disease) 11/25/2020    Chronic pain disorder 11/25/2020    Heart failure with reduced ejection fraction, NYHA class III ([facility]) 11/25/2020    Hyperlipidemia 11/25/2020    Nonrheumatic mitral valve regurgitation 11/25/2020    Osteopenia of multiple sites 11/25/2020    Nonrheumatic aortic valve stenosis 11/25/2020    Multinodular goiter 11/25/2020    Ischemic cardiomyopathy 11/25/2020    Primary osteoarthritis involving multiple joints 11/25/2020    Right bundle branch block (RBBB) 11/25/2020    Hypertension 11/20/2020       Past Surgical History:  No past surgical history on file.    Allergies:  Patient has no known allergies.    Social History:  Social History     Socioeconomic History    Marital status:      Spouse name: Not on file    Number of children: Not on file    Years of education: Not on file    Highest education level: Not on file   Occupational History    Not on file   Tobacco Use    Smoking  status: Never    Smokeless tobacco: Never   Vaping Use    Vaping Use: Never used   Substance and Sexual Activity    Alcohol use: Never    Drug use: Never    Sexual activity: Not Currently   Other Topics Concern    Not on file   Social History Narrative    Not on file     Social Determinants of Health     Financial Resource Strain: Not on file   Food Insecurity: Not on file   Transportation Needs: Not on file   Physical Activity: Not on file   Stress: Not on file   Social Connections: Not on file   Intimate Partner Violence: Not on file   Housing Stability: Not on file       Family History:  No family history on file.    Medications:    Current Outpatient Medications:     Dulaglutide (TRULICITY) 4.5 MG/0.5ML Solution Pen-injector, Inject 1 Each under the skin every 7 days., Disp: 8 mL, Rfl: 4    meclizine (ANTIVERT) 12.5 MG Tab, Take 1 Tablet by mouth 1 time a day as needed for Dizziness., Disp: 90 Tablet, Rfl: 0    potassium chloride SA (K-DUR) 10 MEQ Tab CR, Take 1 Tablet by mouth 1 time a day as needed (supplement). Indications: Low Amount of Potassium in the Blood, take with lasix, Disp: 90 Tablet, Rfl: 0    furosemide (LASIX) 20 MG Tab, Take 1 Tablet by mouth 1 time a day as needed (edema, wt gain of 2 pounds or more overnight ). Indications: Edema, Disp: 60 Tablet, Rfl: 0    insulin glargine (LANTUS) 100 UNIT/ML Solution, Inject 15 Units under the skin every day., Disp: , Rfl:     pregabalin (LYRICA) 75 MG Cap, TAKE 1 CAPSULE BY MOUTH TWICE A DAY FOR 30 DAYS. G89.4 (Patient not taking: No sig reported), Disp: , Rfl:     ranolazine (RANEXA) 500 MG TABLET SR 12 HR, Take 1 Tablet by mouth 2 times a day., Disp: , Rfl:     magnesium oxide (MAG-OX) 400 MG Tab tablet, TAKE 1 (ONE) TABLET (400 MG) BY MOUTH ONCE DAILY FOR 30 DAYS, Disp: , Rfl:     cyclobenzaprine (FLEXERIL) 5 mg tablet, TAKE 1 TABLET BY MOUTH 1 TIME A DAY AS NEEDED FOR MUSCLE SPASMS., Disp: 30 Tablet, Rfl: 1    lisinopril (PRINIVIL) 10 MG Tab, Take 1  Tablet by mouth every day. (Patient taking differently: Take 20 mg by mouth 2 times a day.), Disp: 100 Tablet, Rfl: 7    pregabalin (LYRICA) 50 MG capsule, Take 50 mg by mouth. Indications: Takes once a week, Disp: , Rfl:     HYDROcodone-acetaminophen (NORCO) 5-325 MG Tab per tablet, Take 1 Tablet by mouth every 24 hours as needed. FOR PAIN (Patient not taking: Reported on 9/2/2022), Disp: , Rfl:     metoprolol SR (TOPROL XL) 25 MG TABLET SR 24 HR, Take 1 Tablet by mouth every day. (Patient taking differently: Take 12.5 mg by mouth every day.), Disp: 90 Tablet, Rfl: 7    VITAMIN D PO, Take  by mouth., Disp: , Rfl:     Calcium Carbonate (CALCIUM 500 PO), Take  by mouth., Disp: , Rfl:     lidocaine (LIDODERM) 5 % Patch, , Disp: , Rfl:     albuterol 108 (90 Base) MCG/ACT Aero Soln inhalation aerosol, Inhale 2 Puffs every 6 hours as needed for Shortness of Breath., Disp: , Rfl:     Pyridoxine HCl (VITAMIN B6) 100 MG Tab, Take 100 mg by mouth every day., Disp: , Rfl:     Multiple Vitamins-Minerals (MULTIVITAMIN WOMEN) Tab, Take 1 Tablet by mouth every day., Disp: , Rfl:     Home Care Oxygen, Inhale 1 L/min as needed for Shortness of Breath (shortness of breath). patient has own concentrator Oxygen dose range: 1 L/min Respiratory route via: Nasal Cannula  Oxygen supplier: [employer]  , Disp: , Rfl:     Acetaminophen 500 MG Cap, Take 1,000 mg by mouth every 6 hours as needed (pain)., Disp: , Rfl:     ranolazine (RANEXA) 500 MG TABLET SR 12 HR, Take 500 mg by mouth 2 times a day., Disp: , Rfl:     nitroglycerin (NITROSTAT) 0.4 MG SL Tab, Place 0.4 mg under the tongue as needed for Chest Pain., Disp: , Rfl:     gabapentin (NEURONTIN) 100 MG Cap, Take 200 mg by mouth at bedtime. Indications: Neuropathic Pain, Disp: , Rfl:     BABY ASPIRIN PO, Take 81 mg by mouth 2 times a day. change in dosage from hospital pt takes from time to time  Indications: cardiac, Disp: , Rfl:     EPINEPHrine (PRIMATENE MIST INH), Inhale 1  Inhalation 1 time a day as needed (sob). Indications: sob (Patient not taking: No sig reported), Disp: , Rfl:     atorvastatin (LIPITOR) 20 MG Tab, Take 20 mg by mouth every evening., Disp: , Rfl:     metformin (GLUCOPHAGE) 1000 MG tablet, Take 1,000 mg by mouth 2 times a day, with meals., Disp: , Rfl:     Spacer/Aero-Holding Chambers Device, 1 Device by Does not apply route as needed. (Patient taking differently: 1 Device as needed (for use with inhaler).), Disp: 1 Device, Rfl: 0    Labs: Reviewed    Physical Examination:  Vital signs: There were no vitals taken for this visit. There is no height or weight on file to calculate BMI.    Assessment and Plan:    1. DM2  Basic physiology of DMII was explained to patient as well as microvascular/macrovascular complications. The importance of increasing physical activity to improve diabetes control was discussed with the patient. Patient was also educated on changing diet and making better choices to help control blood sugar.    Discussed Goals: FBG 80 - 130, 2hPP < 180, a1c < 7.0%    FSBG 175 in the office  Pt has been tolerating Trulicity 3mg sub q weekly will further optimize to 4.5mg sub q weekly[facility]  Pt continues to inject 5-10 units of insulin as needed for FSBG > 200    - Medication changes:  Trulicity 4.5     - Lifestyle changes:  Continue to reduce simple carbohydrate consumption  Pt would benefit from NO juice      Follow Up:  3 months    Soco Burns    CC:   MIGUEL ANGEL Tanner

## 2022-09-09 PROBLEM — M62.830 BACK MUSCLE SPASM: Status: ACTIVE | Noted: 2022-01-01

## 2022-09-09 NOTE — PROGRESS NOTES
CC:  Chief Complaint   Patient presents with    Follow-Up     Still having dizziness        HISTORY OF PRESENT ILLNESS: Patient is a 87 y.o. female established patient presenting follow up on back pain and dizziness    Back muscle spasm  Chronic and stable condition.  Patient currently follows with [facility] for pain management of her back.  She states that she only takes hydrocodone-acetaminophen 5-325 as needed for pain.  Unfortunately patient has been out of this medication and is having worsening back spasms and has not been able to follow-up with [facility].  We discussed concerns for her pain contract with them and options to provide pain control in the interim.    Dizziness  Chronic and stable condition.  Has improved with the refill on meclizine  Denies any current symptoms         Allergies:Patient has no known allergies.    Current Outpatient Medications   Medication Sig Dispense Refill    Dulaglutide (TRULICITY) 4.5 MG/0.5ML Solution Pen-injector Inject 1 Each under the skin every 7 days. 8 mL 4    meclizine (ANTIVERT) 12.5 MG Tab Take 1 Tablet by mouth 1 time a day as needed for Dizziness. 90 Tablet 0    potassium chloride SA (K-DUR) 10 MEQ Tab CR Take 1 Tablet by mouth 1 time a day as needed (supplement). Indications: Low Amount of Potassium in the Blood, take with lasix 90 Tablet 0    furosemide (LASIX) 20 MG Tab Take 1 Tablet by mouth 1 time a day as needed (edema, wt gain of 2 pounds or more overnight ). Indications: Edema 60 Tablet 0    insulin glargine (LANTUS) 100 UNIT/ML Solution Inject 15 Units under the skin every day.      ranolazine (RANEXA) 500 MG TABLET SR 12 HR Take 1 Tablet by mouth 2 times a day.      magnesium oxide (MAG-OX) 400 MG Tab tablet TAKE 1 (ONE) TABLET (400 MG) BY MOUTH ONCE DAILY FOR 30 DAYS      cyclobenzaprine (FLEXERIL) 5 mg tablet TAKE 1 TABLET BY MOUTH 1 TIME A DAY AS NEEDED FOR MUSCLE SPASMS. 30 Tablet 1    lisinopril (PRINIVIL) 10 MG Tab Take 1 Tablet by mouth  every day. (Patient taking differently: Take 20 mg by mouth 2 times a day.) 100 Tablet 7    pregabalin (LYRICA) 50 MG capsule Take 50 mg by mouth. Indications: Takes once a week      metoprolol SR (TOPROL XL) 25 MG TABLET SR 24 HR Take 1 Tablet by mouth every day. (Patient taking differently: Take 12.5 mg by mouth every day.) 90 Tablet 7    VITAMIN D PO Take  by mouth.      Calcium Carbonate (CALCIUM 500 PO) Take  by mouth.      lidocaine (LIDODERM) 5 % Patch       albuterol 108 (90 Base) MCG/ACT Aero Soln inhalation aerosol Inhale 2 Puffs every 6 hours as needed for Shortness of Breath.      Pyridoxine HCl (VITAMIN B6) 100 MG Tab Take 100 mg by mouth every day.      Multiple Vitamins-Minerals (MULTIVITAMIN WOMEN) Tab Take 1 Tablet by mouth every day.      Home Care Oxygen Inhale 1 L/min as needed for Shortness of Breath (shortness of breath). patient has own concentrator  Oxygen dose range: 1 L/min  Respiratory route via: Nasal Cannula   Oxygen supplier: [employer]          Acetaminophen 500 MG Cap Take 1,000 mg by mouth every 6 hours as needed (pain).      ranolazine (RANEXA) 500 MG TABLET SR 12 HR Take 500 mg by mouth 2 times a day.      nitroglycerin (NITROSTAT) 0.4 MG SL Tab Place 0.4 mg under the tongue as needed for Chest Pain.      gabapentin (NEURONTIN) 100 MG Cap Take 200 mg by mouth at bedtime. Indications: Neuropathic Pain      BABY ASPIRIN PO Take 81 mg by mouth 2 times a day. change in dosage from hospital  pt takes from time to time  Indications: cardiac      atorvastatin (LIPITOR) 20 MG Tab Take 20 mg by mouth every evening.      metformin (GLUCOPHAGE) 1000 MG tablet Take 1,000 mg by mouth 2 times a day, with meals.      Spacer/Aero-Holding Chambers Device 1 Device by Does not apply route as needed. (Patient taking differently: 1 Device as needed (for use with inhaler).) 1 Device 0    pregabalin (LYRICA) 75 MG Cap TAKE 1 CAPSULE BY MOUTH TWICE A DAY FOR 30 DAYS. G89.4 (Patient not taking: No  sig reported)      HYDROcodone-acetaminophen (NORCO) 5-325 MG Tab per tablet Take 1 Tablet by mouth every 24 hours as needed. FOR PAIN (Patient not taking: No sig reported)      EPINEPHrine (PRIMATENE MIST INH) Inhale 1 Inhalation 1 time a day as needed (sob). Indications: sob (Patient not taking: No sig reported)       No current facility-administered medications for this visit.     Past Medical History:   Diagnosis Date    Closed fracture of rib of right side 11/22/2021    Recurrent falls 3/26/2021    Type 2 diabetes mellitus without complication (HCC) 11/20/2020     History reviewed. No pertinent surgical history.  Social History     Tobacco Use    Smoking status: Never    Smokeless tobacco: Never   Vaping Use    Vaping Use: Never used   Substance Use Topics    Alcohol use: Never    Drug use: Never     Social History     Social History Narrative    Not on file       History reviewed. No pertinent family history.     ROS  See HPI      - NOTE: All other systems reviewed and are negative, except as in HPI.          Exam:    /60   Pulse 83   Temp 36.3 °C (97.3 °F) (Temporal)   Resp 16   Ht 1.524 m (5')   Wt 51.8 kg (114 lb 3.2 oz)   SpO2 96%  Body mass index is 22.3 kg/m².    General: Alert, pleasant, NAD  EYES:   PERRL, EOMI, no icterus or pallor.  Conjunctivae and lids normal.   HENT:  Normocephalic.  External ears normal. Respiratory: Normal respiratory effort.    Skin: Warm, dry, no rashes.  Musculoskeletal: Gait is stable with four-wheel walker.  Moves all extremities well.    Extremities: No clubbing, cyanosis.  Pitting +1 edema noted to bilateral lower extremities  Neurological: No tremors, sensation grossly intact, tone/strength normal, gait is stable with four-wheel walker.  Psych:  Affect/mood is normal, judgement is good, memory is intact, grooming is appropriate.      Assessment/Plan:    1. Back muscle spasm  - ketorolac (TORADOL) injection 30 mg  2. Chronic pain disorder  - ketorolac  (TORADOL) injection 30 mg  Call placed to [facility] regarding possible pain medication for patient however have not been able to hear back from them.  Instructed patient and son to follow-up with them as she is out of her medication until the 18th.  Patient to continue with Tylenol as well as increase fluids to help with kidney protection after medication    Patient continues to pend lab work from last visit and will follow up with home health for this    Discussed with patient possible alternative diagnoses, patient is to take all medications as prescribed.     If symptoms persist FU w/PCP, if symptoms worsen go to emergency room.     If experiencing any side effects from prescribed medications reports to the office immediately or go to emergency room    Reviewed indication, dosage, usage and potential adverse effects of prescribed medications.     Reviewed risks and benefits of treatment plan. Patient verbalizes understanding of all instruction and verbally agrees to plan.      Return for Follow-up labs.      Please note that this dictation was created using voice recognition software. I have made every reasonable attempt to correct obvious errors, but I expect that there are errors of grammar and possibly content that I did not discover before finalizing the note.

## 2022-09-09 NOTE — ASSESSMENT & PLAN NOTE
Chronic and stable condition.  Has improved with the refill on meclizine  Denies any current symptoms

## 2022-09-09 NOTE — ASSESSMENT & PLAN NOTE
Chronic and stable condition.  Patient currently follows with [facility] for pain management of her back.  She states that she only takes hydrocodone-acetaminophen 5-325 as needed for pain.  Unfortunately patient has been out of this medication and is having worsening back spasms and has not been able to follow-up with [facility].  We discussed concerns for her pain contract with them and options to provide pain control in the interim.

## 2022-09-12 NOTE — CASE COMMUNICATION
c/o severe dizziness at sn arrival, vomited x 2 of undigested food, administered meclizine, and pt reports feeling better after 20 minutes, blood glucose this morning 128, denies any back pain ,  unable to urinate for urine specimen for ua , blood draw not done today, ate crackers prior to sn arrival, scheduled lab draw tomorrow morning, instructed pt/son/ regarding fasting 8-10 hrours for lab draw tomorrow , verbalized understan ding vss

## 2022-09-12 NOTE — CASE COMMUNICATION
i agree with this  ----- Message -----  From: Susie Jean Baptiste R.N.  Sent: 9/12/2022   8:40 AM PDT  To: Lupe Barker R.N.         Quality Review for SOC OASIS by ZANA Jean Baptiste, RN on  September 12, 2022    Edits completed by ZANA Jean Baptiste RN:  1.  is 3 per narrative that patient needs min assistance and a walker for safe ambulation  2.  is 16 per care plan therapy sets.  3. Safety precautions checked needle prec autions  4. Changed nutritional requirements to diabetic diet

## 2022-09-12 NOTE — CASE COMMUNICATION
Quality Review for SOC OASIS by ZANA Jean Baptiste, DANIKA on  September 12, 2022    Edits completed by ZANA Jean Baptiste RN:  1.  is 3 per narrative that patient needs min assistance and a walker for safe ambulation  2.  is 16 per care plan therapy sets.  3. Safety precautions checked needle precautions  4. Changed nutritional requirements to diabetic diet

## 2022-09-15 NOTE — CASE COMMUNICATION
noted  ----- Message -----  From: Tita Collins C.N.A.  Sent: 9/15/2022   9:50 AM PDT  To: Joi Hebert R.N.      did not change bed

## 2022-09-26 NOTE — CASE COMMUNICATION
" Falls Template         Date & Time of fall: 9/25/22 11 am           Cause of fall:  Mechanical           Location:  Bedroom           Was the fall witnessed?                  If yes, by who? No            Actions taken by patient:  pt states \"I stumbled on my blanket, I grabbed my bed, slowly got down to the floor next to my bed, I sat there and called my grandaughter and \"            Any new injury?  NO                 If yes , what kind?              Any recent medication changes?   NO            Reviewed Post Fall Questionnaire:  Yes                 Post fall instructions:  Instructed pt and cg to make sure that blankets and pillows are picked up from the floor, hallway clutter free, use walker at all times, call agency , pmd for any changes in condition, O.T. instructed pt and family how to transfer in and out of bed safely to aid in falls prevention, juan balized understanding          Actions Taken: Notified care team, Notified MD, Informed RN supervisor to schedule follow-up visit and Reran medication interactions and sent to pharmacy    "

## 2022-09-29 NOTE — HOME HEALTH
History pertinent to today's visit: CHF, DM TYPE 2, . Skilled need: Assess vs and all body systems, monitor and teach per poc, wound care. Pt/Cg response to the services provided: Alert and oriented x 4, HEART FAILURE ZONE,  Biometrics within parameters, Symptoms are under control / no change in symptoms:,  No new shortness of breath, - No new weight gain of more than 2 pounds  - No new swelling or the feet, ankles, legs or stomach, no hallucination reported, no falls reported, reinforced falls prevention strategies, denies s/s uti, answered all questions and concerns, vss,  Plan for the next visit:assess vs and all body systems, monitor and teach per poc. Case communication: as needed.

## 2022-09-29 NOTE — CASE COMMUNICATION
noted  ----- Message -----  From: Tita Collins C.N.A.  Sent: 9/28/2022   9:27 AM PDT  To: Joi Hebert R.N.      patient did not need bed made or lotion tommie care

## 2022-10-03 NOTE — CASE COMMUNICATION
noted  ----- Message -----  From: Tita Collins C.N.A.  Sent: 10/2/2022   2:41 PM PDT  To: Joi Hebert R.N.      no wieght done today no tommie careno shampoo

## 2022-10-13 NOTE — Clinical Note
Pt with weight gain of 7.8lbs since yesterday is outside of normal parameters and is to be reported to MD and CM.  Pt weighing 115.0 today, was recorded as 107.2 on 10/12/22.  Unsure if this is accurate or possible recording or scale error. Pt is currently asymptomatic. All other vitals are WFL.  Pt did take furosemid during PT session.

## 2022-10-14 NOTE — CASE COMMUNICATION
Fax from [facility]   [employer]uro[employer] needs PA on covermymeds    Routed to [address] nurse pool [facility] S[facility] Chacko gained 8.8 pounds in 2 days, blood pressure 94/58, os sat 91%, have 3+ pitting edema to BLE, gets  fatigued easily, and reailly tired, no sob, denies chest pain, denies dizxziness, denies chest pain, please adivse. She took one table of lasix 20 mg yesterday and today.   Thank you,  Joi

## 2022-10-14 NOTE — CASE COMMUNICATION
noted  ----- Message -----  From: Amaya Rock, PT  Sent: 10/13/2022   5:14 PM PDT  To: Joi Hebert R.N., Ginna Solano, GEOVANI.P.RMarco AntonioN.      Pt with weight gain of 7.8lbs since yesterday is outside of normal parameters and is to be reported to MD and CM.  Pt weighing 115.0 today, was recorded as 107.2 on 10/12/22.  Unsure if this is accurate or possible recording or scale error. Pt is currently asymptomatic. All other vitals are WFL.   Pt did take furosemid during PT session.

## 2022-10-26 NOTE — Clinical Note
FYI: almost 3 pound weight gain over night, RN directed patient to take PRN Lasix. Pt complied.  No complications with breathing noted or reported.

## 2022-10-27 NOTE — CASE COMMUNICATION
Home Health Recertification done, reviewed , reconciled medications, no major drug to drug interactions [facility]

## 2022-10-27 NOTE — PROGRESS NOTES
Medication chart review for [facility] services    Received referral from [facility].   Medications reviewed  compared with discharge summary if available.    Current medication list per [facility]     Current Outpatient Medications:     cyclobenzaprine, 5 mg, Oral, QDAY PRN    Trulicity, 1 Each, Subcutaneous, Q7 DAYS    meclizine, 12.5 mg, Oral, QDAY PRN    potassium chloride SA, 10 mEq, Oral, QDAY PRN    furosemide, 20 mg, Oral, QDAY PRN    insulin glargine, 15 Units, Subcutaneous, DAILY    pregabalin, TAKE 1 CAPSULE BY MOUTH TWICE A DAY FOR 30 DAYS. G89.4 (Patient not taking: No sig reported)    ranolazine, 1 Tablet, Oral, BID    magnesium oxide, TAKE 1 (ONE) TABLET (400 MG) BY MOUTH ONCE DAILY FOR 30 DAYS    lisinopril, 10 mg, Oral, DAILY    HYDROcodone-acetaminophen, 1 Tablet, Oral, Q24HRS PRN (Patient not taking: No sig reported)    metoprolol SR, 25 mg, Oral, DAILY    Calcium Carbonate (CALCIUM 500 PO), Take  by mouth.    lidocaine,     albuterol, 2 Puff, Inhalation, Q6HRS PRN    Vitamin B6, 100 mg, Oral, DAILY    Multivitamin Women, 1 Tablet, Oral, DAILY    Home Care Oxygen, 1 L/min, Inhalation, PRN    Acetaminophen, 1,000 mg, Oral, Q6HRS PRN    nitroglycerin, 0.4 mg, Sublingual, PRN    gabapentin, 200 mg, Oral, QHS    BABY ASPIRIN PO, 81 mg, Oral, BID    EPINEPHrine (PRIMATENE MIST INH), 1 Inhalation, Inhalation, QDAY PRN (Patient not taking: No sig reported)    atorvastatin, 20 mg, Oral, Nightly    metformin, 1,000 mg, Oral, BID WITH MEALS    Spacer/Aero-Holding Chambers, 1 Device, Does not apply, PRN    Location of hospital, and discharge summary date, if applicable:       No Known Allergies    Labs     Lab Results   Component Value Date/Time    SODIUM 135 (L) 07/06/2022 05:45 AM    SODIUM 133 (L) 04/12/2021 08:45 AM    POTASSIUM 4.0 07/06/2022 05:45 AM    POTASSIUM 4.2 04/12/2021 08:45 AM    CHLORIDE 104 07/06/2022 05:45 AM    CHLORIDE 97 04/12/2021 08:45 AM    CO2 23 07/06/2022 05:45 AM    CO2  25 04/12/2021 08:45 AM    GLUCOSE 118 (H) 07/06/2022 05:45 AM    GLUCOSE 154 (H) 04/12/2021 08:45 AM    BUN 15 07/06/2022 05:45 AM    BUN 8 04/12/2021 08:45 AM    CREATININE 0.99 07/06/2022 05:45 AM    CREATININE 0.84 04/12/2021 08:45 AM    GLOMRATE 53 (L) 07/06/2022 05:45 AM     Lab Results   Component Value Date/Time    ALKPHOSPHAT 56 07/05/2022 04:15 PM    ALKPHOSPHAT 79 04/12/2021 08:45 AM    ASTSGOT 12 (L) 07/05/2022 04:15 PM    ASTSGOT 18 04/12/2021 08:45 AM    ALTSGPT 18 07/05/2022 04:15 PM    ALTSGPT 31 04/12/2021 08:45 AM    TBILIRUBIN 0.7 07/05/2022 04:15 PM    TBILIRUBIN 0.5 04/12/2021 08:45 AM    ALBUMIN 3.5 07/05/2022 04:15 PM    ALBUMIN 4.3 04/12/2021 08:45 AM        Assessment for clinically significant drug interactions, drug omissions/additions, duplicative therapies.            CC   Ginna Solano, GEOVANI.P.R.N.  [address] [address]  Fax: [phone]    [facility]  Phone [phone] fax [phone]    This note was created using voice recognition software (Dragon). The accuracy of the dictation is limited by the abilities of the software. I have reviewed the note prior to signing, however some errors in grammar and context are still possible. If you have any questions related to this note please do not hesitate to contact our office.

## 2022-10-28 NOTE — CASE COMMUNICATION
noted  ----- Message -----  From: Esther Batres, SLP  Sent: 10/27/2022   7:46 AM PDT  To: Joi Hebert R.N., GEOVANI Tanner.P.RMarco AntonioN.      FYI: almost 3 pound weight gain over night, RN directed patient to take PRN Lasix. Pt complied.  No complications with breathing noted or reported.

## 2022-11-02 PROBLEM — R41.3 MEMORY CHANGES: Status: ACTIVE | Noted: 2022-01-01

## 2022-11-02 NOTE — CASE COMMUNICATION
Quality Review Completed for 10/26 [facility] OASIS by VALERIE Shipman RN on 11/2/2022:  Edits completed by VALERIE Shipman RN:  1. Added exercises prescribed to activities permitted, ambulate only w/assist, oxygen and needle precautions to safety measures, DM diet to nutritional requirements to the 485 forms  2. Added oxygen to the triage code

## 2022-11-02 NOTE — Clinical Note
I agree with the changes made.  ----- Message -----  From: Suzanne Shipman R.N.  Sent: 11/2/2022  12:22 PM PDT  To: Joi Hebert R.N.      Quality Review Completed for 10/26 [facility] by VALERIE Shipman RN on 11/2/2022:  Edits completed by VALERIE Shipman RN:  1. Added exercises prescribed to activities permitted, ambulate only w/assist, oxygen and needle precautions to safety measures, DM diet to nutritional requirements to the 485 forms  2. Added oxygen to the triage code

## 2022-11-03 NOTE — ASSESSMENT & PLAN NOTE
Chronic and stable condition   Per last cardiology note patient was being titrated down on BP meds  Patient and son state that BP is still slightly low  BP in office today 108/62  Per notes from cardiology continue to titrate down on lisinopril if less than 110 systolic

## 2022-11-03 NOTE — ASSESSMENT & PLAN NOTE
Chronic and stable condition   Family has noticed worsening events and memory changes  States she used to take care of her husbands medications but stopped as she is forgetful  Will complete MOCA

## 2022-11-03 NOTE — ASSESSMENT & PLAN NOTE
Chronic and stable condition   Was following with cardiology but cardiology Dr. Proctor left  They are wondering if they really need to continue with cardiology.   They would like to stay in [facility] cardiology.   Patient continues to take furosemide PRN for 2 lb wt gain   Needs refills  Denies SOB, CP, Edema

## 2022-11-03 NOTE — ASSESSMENT & PLAN NOTE
Chronic and stable condition   Soncortney, states he ahs been giving her 5-10 units when her BS is greater than 200. Has been following with  who states there is no order stating 5-10units if over 200. Per notes from diabetic pharmacist patient was to martinez dose of insulin. New prescription sent  SonCortney, states he blood sugars have been stable with the change in dose to 5-10 units   Denies hypo or hyperglycemia events

## 2022-11-03 NOTE — PROGRESS NOTES
CC:  Chief Complaint   Patient presents with    Follow-Up     Diabetes, cardiology, follow up       HISTORY OF PRESENT ILLNESS: Patient is a 87 y.o. female established patient presenting     Chronic systolic heart failure (HCC)  Chronic and stable condition   Was following with cardiology but cardiology Dr. Proctor left  They are wondering if they really need to continue with cardiology.   They would like to stay in [facility] cardiology.   Patient continues to take furosemide PRN for 2 lb wt gain   Needs refills  Denies SOB, CP, Edema    Hypertension  Chronic and stable condition   Per last cardiology note patient was being titrated down on BP meds  Patient and son state that BP is still slightly low  BP in office today 108/62  Per notes from cardiology continue to titrate down on lisinopril if less than 110 systolic    Uncontrolled type 2 diabetes mellitus with hyperglycemia (HCC)  Chronic and stable condition   Soncortney, states he ahs been giving her 5-10 units when her BS is greater than 200. Has been following with  who states there is no order stating 5-10units if over 200. Per notes from diabetic pharmacist patient was to martinez dose of insulin. New prescription sent  SonCortney, states he blood sugars have been stable with the change in dose to 5-10 units   Denies hypo or hyperglycemia events     Memory changes  Chronic and stable condition   Family has noticed worsening events and memory changes  States she used to take care of her husbands medications but stopped as she is forgetful  Will complete MOCA         Allergies:Patient has no known allergies.    Current Outpatient Medications   Medication Sig Dispense Refill    lisinopril (PRINIVIL) 5 MG Tab Take 1 Tablet by mouth every day. 90 Tablet 0    insulin glargine (LANTUS SOLOSTAR) 100 UNIT/ML Solution Pen-injector injection inject 5-10 units of insulin as needed for FSBG > 200 3 mL 2    furosemide (LASIX) 20 MG Tab Take 1 Tablet by mouth 1 time a day  as needed (edema, wt gain of 2 pounds or more overnight ). Indications: Edema 60 Tablet 1    cyclobenzaprine (FLEXERIL) 5 mg tablet TAKE 1 TABLET BY MOUTH 1 TIME A DAY AS NEEDED FOR MUSCLE SPASMS. 30 Tablet 1    Dulaglutide (TRULICITY) 4.5 MG/0.5ML Solution Pen-injector Inject 1 Each under the skin every 7 days. 8 mL 4    meclizine (ANTIVERT) 12.5 MG Tab Take 1 Tablet by mouth 1 time a day as needed for Dizziness. 90 Tablet 0    potassium chloride SA (K-DUR) 10 MEQ Tab CR Take 1 Tablet by mouth 1 time a day as needed (supplement). Indications: Low Amount of Potassium in the Blood, take with lasix 90 Tablet 0    pregabalin (LYRICA) 75 MG Cap TAKE 1 CAPSULE BY MOUTH TWICE A DAY FOR 30 DAYS. G89.4 (Patient not taking: No sig reported)      ranolazine (RANEXA) 500 MG TABLET SR 12 HR Take 1 Tablet by mouth 2 times a day.      magnesium oxide (MAG-OX) 400 MG Tab tablet TAKE 1 (ONE) TABLET (400 MG) BY MOUTH ONCE DAILY FOR 30 DAYS      HYDROcodone-acetaminophen (NORCO) 5-325 MG Tab per tablet Take 1 Tablet by mouth every 24 hours as needed. FOR PAIN (Patient not taking: No sig reported)      metoprolol SR (TOPROL XL) 25 MG TABLET SR 24 HR Take 1 Tablet by mouth every day. 90 Tablet 7    Calcium Carbonate (CALCIUM 500 PO) Take  by mouth.      lidocaine (LIDODERM) 5 % Patch       albuterol 108 (90 Base) MCG/ACT Aero Soln inhalation aerosol Inhale 2 Puffs every 6 hours as needed for Shortness of Breath.      Pyridoxine HCl (VITAMIN B6) 100 MG Tab Take 100 mg by mouth every day.      Multiple Vitamins-Minerals (MULTIVITAMIN WOMEN) Tab Take 1 Tablet by mouth every day.      Home Care Oxygen Inhale 1 L/min as needed for Shortness of Breath (shortness of breath). patient has own concentrator  Oxygen dose range: 1 L/min  Respiratory route via: Nasal Cannula   Oxygen supplier: [employer]          Acetaminophen 500 MG Cap Take 1,000 mg by mouth every 6 hours as needed (pain).      nitroglycerin (NITROSTAT) 0.4 MG SL Tab Place  0.4 mg under the tongue as needed for Chest Pain.      gabapentin (NEURONTIN) 100 MG Cap Take 200 mg by mouth at bedtime. Indications: Neuropathic Pain      BABY ASPIRIN PO Take 81 mg by mouth 2 times a day. change in dosage from hospital  pt takes from time to time  Indications: cardiac      EPINEPHrine (PRIMATENE MIST INH) Inhale 1 Inhalation 1 time a day as needed (sob). Indications: sob (Patient not taking: No sig reported)      atorvastatin (LIPITOR) 20 MG Tab Take 20 mg by mouth every evening.      metformin (GLUCOPHAGE) 1000 MG tablet Take 1,000 mg by mouth 2 times a day, with meals.      Spacer/Aero-Holding Chambers Device 1 Device by Does not apply route as needed. 1 Device 0     No current facility-administered medications for this visit.     Past Medical History:   Diagnosis Date    Closed fracture of rib of right side 11/22/2021    Recurrent falls 3/26/2021    Type 2 diabetes mellitus without complication (HCC) 11/20/2020     History reviewed. No pertinent surgical history.  Social History     Tobacco Use    Smoking status: Never    Smokeless tobacco: Never   Vaping Use    Vaping Use: Never used   Substance Use Topics    Alcohol use: Never    Drug use: Never     Social History     Social History Narrative    Not on file       History reviewed. No pertinent family history.     ROS    see HPI      - NOTE: All other systems reviewed and are negative, except as in HPI.      Exam:    /62 (BP Location: Left arm, Patient Position: Sitting, BP Cuff Size: Adult)   Pulse 73   Temp 36.6 °C (97.9 °F) (Temporal)   Resp 18   Ht 1.524 m (5')   Wt 51.3 kg (113 lb)   SpO2 96%  Body mass index is 22.07 kg/m².    General: Alert, pleasant, NAD  EYES:   PERRL, EOMI, no icterus or pallor.  Conjunctivae and lids normal.   HENT:  Normocephalic.  External ears normal.   Heart: Regular rate and rhythm.  S1 and S2 normal.  No murmurs appreciated.  Respiratory: Normal respiratory effort.  Clear to auscultation  bilaterally.  Skin: Warm, dry, no rashes.  Musculoskeletal: Gait is with walker.  Moves all extremities well.    Extremities: No clubbing, cyanosis or edema noted.   Neurological: No tremors, sensation grossly intact, tone/strength normal, gait is with walker.  Psych:  Affect/mood is normal, judgement is good, memory is intact, grooming is appropriate.        Assessment/Plan:    1. Debility  - Referral to Physical Therapy    2. Weakness  - Referral to Physical Therapy    3. Falls frequently  - Referral to Physical Therapy    4. Hypertension, unspecified type  - lisinopril (PRINIVIL) 5 MG Tab; Take 1 Tablet by mouth every day.  Dispense: 90 Tablet; Refill: 0    5. Diabetes mellitus with coincident hypertension (HCC)  - insulin glargine (LANTUS SOLOSTAR) 100 UNIT/ML Solution Pen-injector injection; inject 5-10 units of insulin as needed for FSBG > 200  Dispense: 3 mL; Refill: 2  - POCT  A1C    6. Heart failure with reduced ejection fraction, NYHA class III (HCC)  - furosemide (LASIX) 20 MG Tab; Take 1 Tablet by mouth 1 time a day as needed (edema, wt gain of 2 pounds or more overnight ). Indications: Edema  Dispense: 60 Tablet; Refill: 1       7. Chronic systolic heart failure (HCC)  Follow with cardiology    8. Uncontrolled type 2 diabetes mellitus with hyperglycemia ([facility])  New dosing for insulin sent to pharmacy     9. Memory changes  MOCA completed with score of 21  Some memory recall, short term memory        Discussed with patient possible alternative diagnoses, patient is to take all medications as prescribed.     If symptoms persist FU w/PCP, if symptoms worsen go to emergency room.     If experiencing any side effects from prescribed medications reports to the office immediately or go to emergency room.    Reviewed indication, dosage, usage and potential adverse effects of prescribed medications.     Reviewed risks and benefits of treatment plan. Patient verbalizes understanding of all instruction and verbally  agrees to plan.      No follow-ups on file.      Please note that this dictation was created using voice recognition software. I have made every reasonable attempt to correct obvious errors, but I expect that there are errors of grammar and possibly content that I did not discover before finalizing the note.

## 2022-11-04 NOTE — TELEPHONE ENCOUNTER
"Received request via: Pharmacy    Was the patient seen in the last year in this department? Yes    Does the patient have an active prescription (recently filled or refills available) for medication(s) requested? No   Pharmacy comment \"cannot do 3ML because we can't open a box. Please send new rx for 15ML\"  "

## 2022-11-11 NOTE — CASE COMMUNICATION
"noted  ----- Message -----  From: Chetna Mariscal R.N.  Sent: 11/10/2022   4:18 PM PST  To: Joi Hebert R.N., Ginna Solano, MIGUEL ANGEL Esteves Ms. Solano,  The family of this patient called [facility] today, reporting a change in condition. They stated her bp was 164/109 this am, she has been \"out of it, weak and unable to hold a glass, twitching, confused.\" They were encouraged to call [facility], but they stated they want to ke ep her home and comfortable. Hospice was discussed, and they are agreeable. A hospice referral has been requested for you to sign, if you are agreeable. Call if you have any questions.  Thank you,  Chetna Mariscal, RN supervisor  [facility]  x [phone]"

## 2022-11-11 NOTE — CASE COMMUNICATION
"Danielito Ms. Solano,  The family of this patient called [facility] today, reporting a change in condition. They stated her bp was 164/109 this am, she has been \"out of it, weak and unable to hold a glass, twitching, confused.\" They were encouraged to call [facility], but they stated they want to keep her home and comfortable. Hospice was discussed, and they are agreeable. A hospice referral has been requested for you to sign, if you are agree able. Call if you have any questions.  Thank you,  Chetna Mariscal, RN supervisor  [facility]  x [phone]"

## 2022-11-12 NOTE — CASE COMMUNICATION
"Monserrat Ley, SADAF Solano, GEOVANI.OBED.YULIAN.; Joi Hebert R.N.; Amaya Rock, PT; Nakita aVldivia R.N.; Esther Batres, SLP    Falls Template   Date & Time of fall: 11/7/22, 5pm     Cause of fall: Mechanical     Location: Bedroom     Was the fall witnessed?   If yes, by who? Yes, Details: daughter observed patient \"lose footing as turning the corner into the bathroom and slowly went down\". Daughter reports she wasnt able  to get to her in time to help.     Actions taken by patient: paramedics called     Any new injury?   If yes, what kind? No     Any recent medication changes? No     Did patient have appropriate DME available? Yes   If no, please explain: has 4WW and was using     Actions Taken: Notified care team, Notified MD and Informed RN supervisor to schedule follow-up visit   POST GLF Assessment: PRN SNV done and assessed pt, Alert and oriented x  2, son and daughter reports pt needing more assistance with adl's, needs max assist with toileting, max assist with ambulation, pt c/o lower back pain, pain level 5/10, no swelling, noted, left arm with bruises noted, denies any headache, c/o dizziness, family reports decreased appetite and decreased fluid intake, no sob, denies chest pain, family wanted pt evaluated at [facility], pt agreed for SN to call 911, called 911, pt was transport ed to [facility] via [facility]      "

## 2022-11-13 NOTE — CASE COMMUNICATION
Hold home health services effective 11/11/22, pt admitted on 11/11/22 to [facility] for weakness, dx Hyponatremia,and Hypomagnesemia

## 2022-11-14 NOTE — Clinical Note
I agree with the changes made.  ----- Message -----  From: Nini Paige R.N.  Sent: 11/14/2022  10:17 AM PST  To: Joi Hebert R.N.      Quality Review for 11.12.22 Transfer OASIS performed on by DOMINGA Paige RN on 11.14.2022:    Edits completed by DOMINGA Paige RN:  1. Changed  to yes it is flu season and answered #3 per Epic immunizations  2. Changed  c to yes per POC

## 2022-11-14 NOTE — CASE COMMUNICATION
Quality Review for 11.12.22 Transfer OASIS performed on by DOMINGA Paige RN on 11.14.2022:    Edits completed by DOMINGA Paige RN:  1. Changed  to yes it is flu season and answered #3 per Epic immunizations  2. Changed  c to yes per POC

## 2022-11-14 NOTE — Clinical Note
I agree with the changes made  ----- Message -----  From: Nini Paige R.N.  Sent: 11/14/2022  10:19 AM PST  To: Joi Hebert R.N.      Quality Review for 11.12.22 Transfer OASIS performed on by DOMINGA Paige RN on 11.14.2022:     Edits completed by DOMINGA Paige RN:  1. Changed  to yes it is flu season and answered #3 per Epic immunizations  2. Changed  c to yes per POC

## 2022-11-16 NOTE — TELEPHONE ENCOUNTER
Received request via: Patient    Was the patient seen in the last year in this department? Yes    Does the patient have an active prescription (recently filled or refills available) for medication(s) requested? No    Does the patient have halfway Plus and need 100 day supply (blood pressure, diabetes and cholesterol meds only)? Medication is not for cholesterol, blood pressure or diabetes

## 2022-11-17 NOTE — TELEPHONE ENCOUNTER
Spoke with Harpal regarding pts medication refills for flexeril. He states hospice is taking care of all her medications for now on.

## 2022-12-20 ENCOUNTER — DOCUMENTATION (OUTPATIENT)
Dept: HEALTH INFORMATION MANAGEMENT | Facility: OTHER | Age: 87
End: 2022-12-20
Payer: MEDICARE